# Patient Record
Sex: MALE | Race: WHITE | Employment: OTHER | URBAN - METROPOLITAN AREA
[De-identification: names, ages, dates, MRNs, and addresses within clinical notes are randomized per-mention and may not be internally consistent; named-entity substitution may affect disease eponyms.]

---

## 2017-02-07 ENCOUNTER — TELEPHONE (OUTPATIENT)
Dept: CARDIOLOGY CLINIC | Age: 82
End: 2017-02-07

## 2017-03-07 RX ORDER — DABIGATRAN ETEXILATE 150 MG/1
CAPSULE, COATED PELLETS ORAL
Qty: 180 CAPSULE | Refills: 1 | Status: SHIPPED | OUTPATIENT
Start: 2017-03-07 | End: 2017-03-20 | Stop reason: SDUPTHER

## 2017-03-20 ENCOUNTER — OFFICE VISIT (OUTPATIENT)
Dept: CARDIOLOGY CLINIC | Age: 82
End: 2017-03-20

## 2017-03-20 VITALS
DIASTOLIC BLOOD PRESSURE: 60 MMHG | WEIGHT: 164.75 LBS | SYSTOLIC BLOOD PRESSURE: 128 MMHG | HEART RATE: 57 BPM | BODY MASS INDEX: 23.98 KG/M2

## 2017-03-20 DIAGNOSIS — I10 ESSENTIAL HYPERTENSION: ICD-10-CM

## 2017-03-20 DIAGNOSIS — I48.0 PAROXYSMAL ATRIAL FIBRILLATION (HCC): Primary | ICD-10-CM

## 2017-03-20 PROCEDURE — 93000 ELECTROCARDIOGRAM COMPLETE: CPT | Performed by: NURSE PRACTITIONER

## 2017-03-20 PROCEDURE — 99213 OFFICE O/P EST LOW 20 MIN: CPT | Performed by: NURSE PRACTITIONER

## 2017-03-20 RX ORDER — TAMSULOSIN HYDROCHLORIDE 0.4 MG/1
0.4 CAPSULE ORAL 2 TIMES DAILY
COMMUNITY
End: 2018-09-11

## 2017-03-20 RX ORDER — SOTALOL HYDROCHLORIDE 80 MG/1
TABLET ORAL
Qty: 180 TABLET | Refills: 3 | Status: SHIPPED | OUTPATIENT
Start: 2017-03-20 | End: 2017-11-10 | Stop reason: SDUPTHER

## 2017-03-20 RX ORDER — DABIGATRAN ETEXILATE 150 MG/1
CAPSULE, COATED PELLETS ORAL
Qty: 180 CAPSULE | Refills: 3 | Status: SHIPPED | OUTPATIENT
Start: 2017-03-20 | End: 2017-10-23 | Stop reason: ALTCHOICE

## 2017-10-23 ENCOUNTER — OFFICE VISIT (OUTPATIENT)
Dept: CARDIOLOGY CLINIC | Age: 82
End: 2017-10-23

## 2017-10-23 VITALS
WEIGHT: 162 LBS | DIASTOLIC BLOOD PRESSURE: 78 MMHG | SYSTOLIC BLOOD PRESSURE: 134 MMHG | HEART RATE: 54 BPM | BODY MASS INDEX: 23.58 KG/M2

## 2017-10-23 DIAGNOSIS — I10 ESSENTIAL HYPERTENSION: ICD-10-CM

## 2017-10-23 DIAGNOSIS — I48.0 PAROXYSMAL ATRIAL FIBRILLATION (HCC): Primary | ICD-10-CM

## 2017-10-23 PROCEDURE — 93000 ELECTROCARDIOGRAM COMPLETE: CPT | Performed by: NURSE PRACTITIONER

## 2017-10-23 PROCEDURE — 99213 OFFICE O/P EST LOW 20 MIN: CPT | Performed by: NURSE PRACTITIONER

## 2017-10-23 NOTE — PROGRESS NOTES
[facility]   Electrophysiology   Date: 10/23/2017     CC: atrial fibrillation   HPI: I had the privilege of seeing Stephenie  in follow up for paroxysmal atrial fibrillation, Hypertension, former smoker and right CEA on 4/5/12 after he was found to have cholesterol emboli on ophthalmology evaluation. During the post-op period S/P CEA 2012, he developed some atrial fibrillation, that was asymptomatic. He is controlled on Sotalol. Anticoagulated with Pradaxa      Interval History:   He has been well since last visit. 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 Denies palpitations, chest pain, shortness of breath or fatigue. He checks his BP at home, but forgot log today. Inquiring about changing from Pradaxa to Eliquis. His sister is on Eliquis. No bleeding or excessive bruising. Recent testing and relevant Cardiac history:  ECG: sinus rhythm, BJm=920 msec   Last Echo: 4/8/12: Left ventricular size is normal. Overall left ventricular function is probably normal. I suspect ejection fraction is in the 55-60% range. Regional abnormalities cannot be excluded. The mitral valve appears grossly normal. The left atrial size appears normal. The aortic valve is difficult to visualize but appears grossly normal. The right heart chambers are poorly seen but appear grossly normal in size. There is no obvious pericardial effusion. No significant valvular insufficiency was seen.    Anticoagulation: Pradaxa       Past Medical History:   Diagnosis Date    Arthritis     Atrial fibrillation ([address]     Cancer (HCC)     SKIN    Carotid artery stenosis     Collar bone fracture 1940'S    RIGHT AND LEFT - NO SURGICAL REPAIR    Diabetes mellitus ([address]     DIET CONTROLLED    Gout     Hyperlipidemia     Hypertension         Past Surgical History:   Procedure Laterality Date    CAROTID ENDARTERECTOMY  4/5/12    R side    CATARACT REMOVAL WITH IMPLANT      RIGHT AND LEFT    COLONOSCOPY      EYE SURGERY laser    OTHER SURGICAL HISTORY      SUTURES LT CHIN AS CHILD     SKIN SPLIT GRAFT  2002    2011    X 3    TONSILLECTOMY         Allergies:  No Known Allergies    Social History:  Reviewed. reports that he quit smoking about 20 years ago. He has a 48.00 pack-year smoking history. He has never used smokeless tobacco. He reports that he does not drink alcohol or use drugs. Family History:  Reviewed. family history includes Diabetes in his brother; Heart Disease in his brother; Stroke in his father. Review of System:    · General ROS: negative for - chills, fever   · Psychological ROS: negative for - anxiety or depression  · Ophthalmic ROS: negative for - eye pain or loss of vision  · ENT ROS: negative for - headaches, sore throat   · Allergy and Immunology ROS: negative for - hives  · Hematological and Lymphatic ROS: negative for - bleeding problems, blood clots, bruising or jaundice  · Endocrine ROS: negative for - skin changes, temperature intolerance or unexpected weight changes  · Respiratory ROS: negative for - cough, sputum, wheezing  · Cardiovascular ROS: Per HPI.    · Gastrointestinal ROS: negative for - abdominal pain, diarrhea, nausea/vomiting, bleeding   · Genito-Urinary ROS: negative for - dysuria or incontinence  · Musculoskeletal ROS: negative for - joint swelling   · Neurological ROS: negative for - confusion, numbness/tingling, seizures, weakness  · Dermatological ROS: negative for - rash    Physical Examination:  Vitals:    10/23/17 1450   BP: 134/78   Pulse: 54       Constitutional and General Appearance: Warm and dry, no apparent distress, normal coloration  HEENT:  Normocephalic, atraumatic  Respiratory:  · Normal excursion and expansion without use of accessory muscles  · Resp Auscultation: Normal breath sounds without dullness  Cardiovascular:  · The apical impulses not displaced  · Heart tones are crisp and normal  · JVP less than 8 cm H2O  · Regular rate and rhythm, S1, S2  · Peripheral pulses are symmetrical and full  · There is no clubbing, cyanosis of the extremities. · No edema  · Pedal Pulses: 2+ and equal   Abdomen:  · No masses or tenderness  · Liver/Spleen: No Abnormalities Noted  Neurological/Psychiatric:  · Alert and oriented in all spheres  · Moves all extremities well  · Exhibits normal gait balance and coordination  · No abnormalities of mood, affect, memory, mentation, or behavior are noted    Labs:  Reviewed. Medication:  Current Outpatient Prescriptions   Medication Sig Dispense Refill    tamsulosin (FLOMAX) 0.4 MG capsule Take 0.4 mg by mouth 2 times daily      dabigatran (PRADAXA) 150 MG capsule TAKE ONE CAPSULE BY MOUTH TWICE A  capsule 3    sotalol (BETAPACE) 80 MG tablet TAKE ONE TABLET BY MOUTH EVERY 12 HOURS 180 tablet 3    lisinopril (PRINIVIL;ZESTRIL) 20 MG tablet Take 1 tablet by mouth daily. (Patient taking differently: Take 5 mg by mouth daily ) 90 tablet 1     No current facility-administered medications for this visit. 1. Paroxysmal atrial fibrillation ([address]    2. Essential hypertension         Assessment and plan:   -Paroxysmal atrial fibrillation   -remains in sinus   -continue Sotalol   -discussed changing from Pradaxa to Eliquis. Last Creatinine 0.82, CrCl=71 ml/min. No issues with changing to Eliquis 5 mg BID. -HTN   -controlled       Thank you for allowing me to participate in the care of Justen Marinelli. Further evaluation will be based upon the patient's clinical course and testing results. All questions and concerns were addressed to the patient/family. Alternatives to my treatment were discussed. I have discussed the above stated plan and the patient verbalized understanding and agreed with the plan.     Mayito Alegria, [address]

## 2017-10-23 NOTE — PATIENT INSTRUCTIONS
No change in medications  Okay to stop Pradaxa and start Eliquis 5 mg twice a day  Follow up in 6 months

## 2017-10-26 ENCOUNTER — TELEPHONE (OUTPATIENT)
Dept: CARDIOLOGY CLINIC | Age: 82
End: 2017-10-26

## 2017-11-10 RX ORDER — SOTALOL HYDROCHLORIDE 80 MG/1
TABLET ORAL
Qty: 180 TABLET | Refills: 3 | Status: SHIPPED | OUTPATIENT
Start: 2017-11-10 | End: 2018-11-19 | Stop reason: SDUPTHER

## 2017-11-10 NOTE — TELEPHONE ENCOUNTER
Sample requested: Eliquis     Strength: 5 mg    Dosage: 1 tab BID    Patient's call back number: [phone]

## 2017-12-14 ENCOUNTER — TELEPHONE (OUTPATIENT)
Dept: CARDIOLOGY CLINIC | Age: 82
End: 2017-12-14

## 2018-01-11 ENCOUNTER — HOSPITAL ENCOUNTER (OUTPATIENT)
Dept: OTHER | Age: 83
Discharge: HOME | End: 2018-01-11
Attending: FAMILY MEDICINE | Admitting: FAMILY MEDICINE

## 2018-01-11 DIAGNOSIS — R52 PAIN: ICD-10-CM

## 2018-03-07 LAB
CHOLESTEROL, TOTAL: 140 MG/DL
CHOLESTEROL/HDL RATIO: NORMAL
GLUCOSE FASTING: 106 MG/DL
HDLC SERPL-MCNC: 44 MG/DL (ref 35–70)
LDL CHOLESTEROL CALCULATED: 85 MG/DL (ref 0–160)
TRIGL SERPL-MCNC: 55 MG/DL
VLDLC SERPL CALC-MCNC: NORMAL MG/DL

## 2018-04-23 ENCOUNTER — OFFICE VISIT (OUTPATIENT)
Dept: CARDIOLOGY CLINIC | Age: 83
End: 2018-04-23

## 2018-04-23 VITALS
DIASTOLIC BLOOD PRESSURE: 52 MMHG | HEART RATE: 64 BPM | RESPIRATION RATE: 16 BRPM | BODY MASS INDEX: 23.4 KG/M2 | HEIGHT: 69 IN | WEIGHT: 158 LBS | SYSTOLIC BLOOD PRESSURE: 128 MMHG

## 2018-04-23 DIAGNOSIS — I48.0 PAROXYSMAL ATRIAL FIBRILLATION (HCC): ICD-10-CM

## 2018-04-23 DIAGNOSIS — I10 ESSENTIAL HYPERTENSION: Primary | ICD-10-CM

## 2018-04-23 PROCEDURE — 99214 OFFICE O/P EST MOD 30 MIN: CPT | Performed by: NURSE PRACTITIONER

## 2018-04-23 PROCEDURE — 93000 ELECTROCARDIOGRAM COMPLETE: CPT | Performed by: NURSE PRACTITIONER

## 2018-06-04 VITALS
HEIGHT: 70 IN | RESPIRATION RATE: 15 BRPM | DIASTOLIC BLOOD PRESSURE: 62 MMHG | HEART RATE: 70 BPM | BODY MASS INDEX: 23.77 KG/M2 | TEMPERATURE: 97.6 F | WEIGHT: 166 LBS | SYSTOLIC BLOOD PRESSURE: 124 MMHG

## 2018-06-18 ENCOUNTER — TELEPHONE (OUTPATIENT)
Dept: CARDIOLOGY CLINIC | Age: 83
End: 2018-06-18

## 2018-07-11 ENCOUNTER — TELEPHONE (OUTPATIENT)
Dept: CARDIOLOGY CLINIC | Age: 83
End: 2018-07-11

## 2018-09-04 ENCOUNTER — TELEPHONE (OUTPATIENT)
Dept: CARDIOLOGY CLINIC | Age: 83
End: 2018-09-04

## 2018-09-04 LAB
A/G RATIO: 1.6 (ref 1.1–2.2)
ALBUMIN SERPL-MCNC: 4.2 G/DL (ref 3.4–5)
ALP BLD-CCNC: 68 U/L (ref 40–129)
ALT SERPL-CCNC: 13 U/L (ref 10–40)
ANION GAP SERPL CALCULATED.3IONS-SCNC: 13 MMOL/L (ref 3–16)
AST SERPL-CCNC: 18 U/L (ref 15–37)
BILIRUB SERPL-MCNC: 0.7 MG/DL (ref 0–1)
BUN BLDV-MCNC: 11 MG/DL (ref 7–20)
CALCIUM SERPL-MCNC: 9.1 MG/DL (ref 8.3–10.6)
CHLORIDE BLD-SCNC: 104 MMOL/L (ref 99–110)
CO2: 25 MMOL/L (ref 21–32)
CREAT SERPL-MCNC: 0.8 MG/DL (ref 0.8–1.3)
GFR AFRICAN AMERICAN: >60
GFR NON-AFRICAN AMERICAN: >60
GLOBULIN: 2.6 G/DL
GLUCOSE BLD-MCNC: 110 MG/DL (ref 70–99)
POTASSIUM SERPL-SCNC: 4.2 MMOL/L (ref 3.5–5.1)
SODIUM BLD-SCNC: 142 MMOL/L (ref 136–145)
TOTAL PROTEIN: 6.8 G/DL (ref 6.4–8.2)

## 2018-09-04 NOTE — TELEPHONE ENCOUNTER
Sample requested: eliquis     Strength:     Dosage:     Patient's call back number: LM he doesn't answer numbers he doesn't know

## 2018-09-04 NOTE — TELEPHONE ENCOUNTER
lmor- placed Eliquis 5 mg x 3 lot # [id number], exp date 12/2020 at [facility] cardiac office .

## 2018-09-05 LAB
ESTIMATED AVERAGE GLUCOSE: 111.2 MG/DL
HBA1C MFR BLD: 5.5 %

## 2018-09-11 ENCOUNTER — OFFICE VISIT (OUTPATIENT)
Dept: PRIMARY CARE CLINIC | Age: 83
End: 2018-09-11

## 2018-09-11 VITALS
BODY MASS INDEX: 23.62 KG/M2 | HEART RATE: 64 BPM | DIASTOLIC BLOOD PRESSURE: 62 MMHG | HEIGHT: 70 IN | WEIGHT: 165 LBS | SYSTOLIC BLOOD PRESSURE: 136 MMHG | RESPIRATION RATE: 15 BRPM

## 2018-09-11 DIAGNOSIS — N40.1 BENIGN PROSTATIC HYPERPLASIA WITH LOWER URINARY TRACT SYMPTOMS, SYMPTOM DETAILS UNSPECIFIED: ICD-10-CM

## 2018-09-11 DIAGNOSIS — Z23 INFLUENZA VACCINE NEEDED: ICD-10-CM

## 2018-09-11 DIAGNOSIS — I48.91 ATRIAL FIBRILLATION, UNSPECIFIED TYPE (HCC): Primary | ICD-10-CM

## 2018-09-11 DIAGNOSIS — R73.03 PREDIABETES: ICD-10-CM

## 2018-09-11 PROCEDURE — 90662 IIV NO PRSV INCREASED AG IM: CPT | Performed by: FAMILY MEDICINE

## 2018-09-11 PROCEDURE — 99214 OFFICE O/P EST MOD 30 MIN: CPT | Performed by: FAMILY MEDICINE

## 2018-09-11 PROCEDURE — G0008 ADMIN INFLUENZA VIRUS VAC: HCPCS | Performed by: FAMILY MEDICINE

## 2018-09-11 RX ORDER — TAMSULOSIN HYDROCHLORIDE 0.4 MG/1
0.4 CAPSULE ORAL 2 TIMES DAILY
Qty: 30 CAPSULE | Refills: 1 | Status: SHIPPED | OUTPATIENT
Start: 2018-09-11 | End: 2019-08-15

## 2018-09-11 ASSESSMENT — ENCOUNTER SYMPTOMS
SINUS PRESSURE: 0
GASTROINTESTINAL NEGATIVE: 1
NAUSEA: 0
VOMITING: 0
ABDOMINAL DISTENTION: 0
SHORTNESS OF BREATH: 0
COLOR CHANGE: 0
WHEEZING: 0
CHEST TIGHTNESS: 0
SORE THROAT: 0
ABDOMINAL PAIN: 0
BACK PAIN: 0
COUGH: 0
EYE PAIN: 0
RESPIRATORY NEGATIVE: 1
EYE DISCHARGE: 0
TROUBLE SWALLOWING: 0
EYE ITCHING: 0
APNEA: 0
EYES NEGATIVE: 1
DIARRHEA: 0
CONSTIPATION: 0
PHOTOPHOBIA: 0

## 2018-09-11 ASSESSMENT — PATIENT HEALTH QUESTIONNAIRE - PHQ9
SUM OF ALL RESPONSES TO PHQ QUESTIONS 1-9: 0
SUM OF ALL RESPONSES TO PHQ QUESTIONS 1-9: 0
2. FEELING DOWN, DEPRESSED OR HOPELESS: 0
1. LITTLE INTEREST OR PLEASURE IN DOING THINGS: 0
SUM OF ALL RESPONSES TO PHQ9 QUESTIONS 1 & 2: 0

## 2018-09-11 NOTE — PROGRESS NOTES
9/11/2018    Chief Complaint   Patient presents with    Hyperlipidemia     labs done 9-4-18, pt not on any meds for BP or chol, states feeling well    Hypertension     not checking BP very often, pt wants flu shot       HPI:  Patient is here for follow-up of his hypertension and hyperlipidemia but is not on any medication. He wants to get a flu shot today. He states he saw Dr. Jenette Libman of the urology group back in April for his BPH. Patient states she's currently urinating about every 2-3 hours at night and he would like to go back on Flomax. He denies chest pain shortness of breath nausea vomiting diarrhea fever chills polyuria polydipsia. Review of Systems   Constitutional: Negative for activity change, appetite change, chills and unexpected weight change. HENT: Negative for congestion, ear pain, hearing loss, sinus pressure, sore throat and trouble swallowing. Eyes: Negative. Negative for photophobia, pain, discharge, itching and visual disturbance. Respiratory: Negative. Negative for apnea, cough, chest tightness, shortness of breath and wheezing. Cardiovascular: Negative. Negative for chest pain, palpitations and leg swelling. Gastrointestinal: Negative. Negative for abdominal distention, abdominal pain, constipation, diarrhea, nausea and vomiting. Endocrine: Negative. Negative for cold intolerance, heat intolerance, polydipsia, polyphagia and polyuria. Genitourinary: Negative for difficulty urinating, dysuria, frequency and urgency. Musculoskeletal: Negative. Negative for arthralgias, back pain, gait problem and joint swelling. Skin: Negative. Negative for color change, pallor, rash and wound. Allergic/Immunologic: Negative for food allergies. Neurological: Negative. Negative for dizziness, weakness, light-headedness, numbness and headaches. Hematological: Negative. Negative for adenopathy. Psychiatric/Behavioral: Negative.   Negative for agitation, behavioral normal.   Skin: Skin is warm and dry. No rash noted. He is not diaphoretic. No erythema. No pallor. Psychiatric: He has a normal mood and affect. His behavior is normal. Judgment and thought content normal.   Vitals reviewed. ASSESSMENT/PLAN:  Sommer Ross was seen today for hyperlipidemia and hypertension. Diagnoses and all orders for this visit:    Atrial fibrillation, unspecified type ([address]  He'll continue Eliquis 5 mg b.i.d. and Betapace 80 mg a day he gets up from his cardiologist.  Influenza vaccine needed  -     INFLUENZA, HIGH DOSE, 65 YRS +, IM, PF, PREFILL SYR, 0.5ML (FLUZONE HD)  Him a flu shot today. Prediabetes  -     Hemoglobin A1C; Future  Will an A1c is 5.5. His fasting blood sugar was a little elevated at 110 fasting. Adding any symptoms of diabetes. Will continue diet control. Recheck his A1c in 6 months. Benign prostatic hyperplasia with lower urinary tract symptoms, symptom details unspecified  -     tamsulosin (FLOMAX) 0.4 MG capsule; Take 1 capsule by mouth 2 times daily    I restarted him on his Flomax he can start at 1 tablet a day for the next couple of weeks if his symptoms don't improve he can go to 2 pills a day. I did caution him about hypotension. .  And I'll check him again in one month. No Follow-up on file.   Reviewed and/or ordered clinical lab results Yes  Reviewed and/or ordered radiology tests No  Reviewed and/or ordered other diagnostic tests No  Discussed test results with performing physician No  Independently reviewed image, tracing, or specimen No  Made a decision to obtain old records No  Reviewed and summarized old records No      Pam Hodge was counseled regarding symptoms of current diagnosis, course and complications of disease if inadequately treated, side effects of medications, diagnosis, treatment options, and prognosis, risks, benefits, complications, and alternatives of treatment, labs, imaging and other studies and treatment targets and

## 2018-09-24 ENCOUNTER — TELEPHONE (OUTPATIENT)
Dept: CARDIOLOGY CLINIC | Age: 83
End: 2018-09-24

## 2018-09-24 NOTE — TELEPHONE ENCOUNTER
Sample requested: Eliquis    Strength:  5 mg    Dosage: 1 tab BID    Patient's call back number: [phone]

## 2018-09-25 ENCOUNTER — TELEPHONE (OUTPATIENT)
Dept: CARDIOLOGY CLINIC | Age: 83
End: 2018-09-25

## 2018-09-25 NOTE — TELEPHONE ENCOUNTER
Sample requested: Eliquis      Strength: 5mg    Dosage: 2 times daily    Patient's call back number: [phone]

## 2018-10-10 ENCOUNTER — OFFICE VISIT (OUTPATIENT)
Dept: PRIMARY CARE CLINIC | Age: 83
End: 2018-10-10
Payer: COMMERCIAL

## 2018-10-10 VITALS
DIASTOLIC BLOOD PRESSURE: 72 MMHG | SYSTOLIC BLOOD PRESSURE: 142 MMHG | BODY MASS INDEX: 23.62 KG/M2 | RESPIRATION RATE: 15 BRPM | WEIGHT: 165 LBS | HEIGHT: 70 IN | HEART RATE: 70 BPM

## 2018-10-10 DIAGNOSIS — N40.0 BENIGN PROSTATIC HYPERPLASIA WITHOUT LOWER URINARY TRACT SYMPTOMS: Primary | ICD-10-CM

## 2018-10-10 PROCEDURE — 99213 OFFICE O/P EST LOW 20 MIN: CPT | Performed by: FAMILY MEDICINE

## 2018-10-10 ASSESSMENT — ENCOUNTER SYMPTOMS
RESPIRATORY NEGATIVE: 1
SHORTNESS OF BREATH: 0
SINUS PRESSURE: 0
VOMITING: 0
CHEST TIGHTNESS: 0
EYE PAIN: 0
COLOR CHANGE: 0
EYES NEGATIVE: 1
GASTROINTESTINAL NEGATIVE: 1
ABDOMINAL PAIN: 0
PHOTOPHOBIA: 0
SORE THROAT: 0
NAUSEA: 0
EYE DISCHARGE: 0
EYE ITCHING: 0
WHEEZING: 0
DIARRHEA: 0
COUGH: 0
APNEA: 0
TROUBLE SWALLOWING: 0
CONSTIPATION: 0
ABDOMINAL DISTENTION: 0
BACK PAIN: 0

## 2018-10-10 NOTE — PROGRESS NOTES
BROOKE Hernandez CNP   sotalol (BETAPACE) 80 MG tablet TAKE ONE TABLET BY MOUTH EVERY 12 HOURS Yes BROOKE Pendleton CNP     Current Outpatient Prescriptions   Medication Sig Dispense Refill    tamsulosin (FLOMAX) 0.4 MG capsule Take 1 capsule by mouth 2 times daily 30 capsule 1    apixaban (ELIQUIS) 5 MG TABS tablet Take 1 tablet by mouth 2 times daily 60 tablet 3    sotalol (BETAPACE) 80 MG tablet TAKE ONE TABLET BY MOUTH EVERY 12 HOURS 180 tablet 3     No current facility-administered medications for this visit. Health Maintenance   Topic Date Due    DTaP/Tdap/Td vaccine (1 - Tdap) 12/31/1952    Shingles Vaccine (2 of 2 - 2 Dose Series) 08/23/2016    Flu vaccine  Completed    Pneumococcal low/med risk  Completed      Social History     Social History    Marital status:       Spouse name: N/A    Number of children: N/A    Years of education: N/A     Social History Main Topics    Smoking status: Former Smoker     Packs/day: 1.00     Years: 48.00     Quit date: 8/2/1997    Smokeless tobacco: Never Used    Alcohol use No    Drug use: No    Sexual activity: Not Asked     Other Topics Concern    None     Social History Narrative    None     Family History   Problem Relation Age of Onset    Stroke Father     Heart Disease Brother     Diabetes Brother      Patient Active Problem List   Diagnosis    Carotid stenosis    DM (diabetes mellitus) ([address]    Hypertension    Atrial fibrillation ([address]        LABS:  Orders Only on 09/04/2018   Component Date Value Ref Range Status    Hemoglobin A1C 09/04/2018 5.5  See comment % Final    Comment: Comment:  Diagnosis of Diabetes: > or = 6.5%  Increased risk of diabetes (Prediabetes): 5.7-6.4%  Glycemic Control: Nonpregnant Adults: <7.0%                    Pregnant: <6.0%        eAG 09/04/2018 111.2  mg/dL Final          PHYSICAL EXAM  BP (!) 142/72 (Site: Right Upper Arm, Position: Sitting)   Pulse 70   Resp 15   Ht 5' 9.5\" (1.765 m)   Wt 165 lb (74.8 kg)   BMI 24.02 kg/m²     BP Readings from Last 3 Encounters:   10/10/18 (!) 142/72   09/11/18 136/62   03/13/18 124/62       Wt Readings from Last 3 Encounters:   10/10/18 165 lb (74.8 kg)   09/11/18 165 lb (74.8 kg)   03/13/18 166 lb (75.3 kg)        Physical Exam   Constitutional: He is oriented to person, place, and time. He appears well-developed and well-nourished. No distress. HENT:   Head: Normocephalic and atraumatic. Right Ear: External ear normal.   Left Ear: External ear normal.   Nose: Nose normal.   Mouth/Throat: Oropharynx is clear and moist. No oropharyngeal exudate. Eyes: Pupils are equal, round, and reactive to light. Conjunctivae and EOM are normal. Right eye exhibits no discharge. Left eye exhibits no discharge. No scleral icterus. Neck: Normal range of motion. Neck supple. No JVD present. No tracheal deviation present. No thyromegaly present. Cardiovascular: Normal rate and regular rhythm. Exam reveals no gallop and no friction rub. No murmur heard. Pulmonary/Chest: Effort normal and breath sounds normal. No stridor. No respiratory distress. He has no wheezes. He has no rales. He exhibits no tenderness. Abdominal: Soft. Bowel sounds are normal. He exhibits no distension and no mass. There is no tenderness. There is no rebound and no guarding. Musculoskeletal: Normal range of motion. He exhibits no edema, tenderness or deformity. Lymphadenopathy:     He has no cervical adenopathy. Neurological: He is alert and oriented to person, place, and time. He has normal reflexes. No cranial nerve deficit. He exhibits normal muscle tone. Coordination normal.   Skin: Skin is warm and dry. No rash noted. He is not diaphoretic. No erythema. No pallor. Psychiatric: He has a normal mood and affect. His behavior is normal. Judgment and thought content normal.   Vitals reviewed.       ASSESSMENT/PLAN:  Sharona Mendenhall was seen today for benign prostatic hypertrophy. Diagnoses and all orders for this visit:    Benign prostatic hyperplasia without lower urinary tract symptoms    For now I can continue Flomax 0.4 mg 2 q.d. Made a referral to Dr. Joyce Valentino at the urology group. Surgery got high-dose flu shot this year. No Follow-up on file. Reviewed and/or ordered clinical lab results Yes  Reviewed and/or ordered radiology tests No  Reviewed and/or ordered other diagnostic tests No  Discussed test results with performing physician No  Independently reviewed image, tracing, or specimen No  Made a decision to obtain old records No  Reviewed and summarized old records No      Chandrika Alaniz was counseled regarding symptoms of current diagnosis, course and complications of disease if inadequately treated, side effects of medications, diagnosis, treatment options, and prognosis, risks, benefits, complications, and alternatives of treatment, labs, imaging and other studies and treatment targets and goals. He understands instructions and counseling. This dictation was performed with a verbal recognition program (DRAGON) and it was checked for errors. It is possible that there are still dictated errors within this office note. If so, please bring any errors to my attention for an addendum. All efforts were made to ensure that this office note is accurate.

## 2018-10-22 ENCOUNTER — OFFICE VISIT (OUTPATIENT)
Dept: CARDIOLOGY CLINIC | Age: 83
End: 2018-10-22
Payer: COMMERCIAL

## 2018-10-22 VITALS
HEART RATE: 55 BPM | HEIGHT: 70 IN | OXYGEN SATURATION: 98 % | WEIGHT: 164.5 LBS | DIASTOLIC BLOOD PRESSURE: 58 MMHG | SYSTOLIC BLOOD PRESSURE: 136 MMHG | BODY MASS INDEX: 23.55 KG/M2

## 2018-10-22 DIAGNOSIS — I48.91 ATRIAL FIBRILLATION, UNSPECIFIED TYPE (HCC): Primary | ICD-10-CM

## 2018-10-22 DIAGNOSIS — I10 ESSENTIAL HYPERTENSION: ICD-10-CM

## 2018-10-22 PROCEDURE — 93000 ELECTROCARDIOGRAM COMPLETE: CPT | Performed by: NURSE PRACTITIONER

## 2018-10-22 PROCEDURE — 99214 OFFICE O/P EST MOD 30 MIN: CPT | Performed by: NURSE PRACTITIONER

## 2018-11-05 LAB
ORGANISM: ABNORMAL
URINE CULTURE, ROUTINE: ABNORMAL
URINE CULTURE, ROUTINE: ABNORMAL

## 2018-11-19 RX ORDER — SOTALOL HYDROCHLORIDE 80 MG/1
TABLET ORAL
Qty: 180 TABLET | Refills: 3 | Status: SHIPPED | OUTPATIENT
Start: 2018-11-19 | End: 2019-09-18 | Stop reason: SDUPTHER

## 2018-12-10 DIAGNOSIS — I10 ESSENTIAL HYPERTENSION: Primary | ICD-10-CM

## 2018-12-10 RX ORDER — AMLODIPINE BESYLATE 5 MG/1
5 TABLET ORAL DAILY
Qty: 30 TABLET | Refills: 0 | OUTPATIENT
Start: 2018-12-10 | End: 2019-04-09

## 2018-12-21 ENCOUNTER — TELEPHONE (OUTPATIENT)
Dept: CARDIOLOGY CLINIC | Age: 83
End: 2018-12-21

## 2019-01-07 ENCOUNTER — OFFICE VISIT (OUTPATIENT)
Dept: PRIMARY CARE CLINIC | Age: 84
End: 2019-01-07
Payer: COMMERCIAL

## 2019-01-07 VITALS
SYSTOLIC BLOOD PRESSURE: 126 MMHG | WEIGHT: 172 LBS | HEIGHT: 70 IN | HEART RATE: 72 BPM | BODY MASS INDEX: 24.62 KG/M2 | DIASTOLIC BLOOD PRESSURE: 64 MMHG | RESPIRATION RATE: 15 BRPM

## 2019-01-07 DIAGNOSIS — I48.91 ATRIAL FIBRILLATION, UNSPECIFIED TYPE (HCC): ICD-10-CM

## 2019-01-07 DIAGNOSIS — I10 ESSENTIAL HYPERTENSION: ICD-10-CM

## 2019-01-07 DIAGNOSIS — N40.0 BENIGN PROSTATIC HYPERPLASIA WITHOUT LOWER URINARY TRACT SYMPTOMS: Primary | ICD-10-CM

## 2019-01-07 PROCEDURE — 99213 OFFICE O/P EST LOW 20 MIN: CPT | Performed by: FAMILY MEDICINE

## 2019-01-07 RX ORDER — FINASTERIDE 5 MG/1
5 TABLET, FILM COATED ORAL DAILY
COMMUNITY
End: 2019-09-18 | Stop reason: SDUPTHER

## 2019-01-07 ASSESSMENT — ENCOUNTER SYMPTOMS
RESPIRATORY NEGATIVE: 1
CONSTIPATION: 0
ABDOMINAL DISTENTION: 0
EYE ITCHING: 0
CHEST TIGHTNESS: 0
COLOR CHANGE: 0
SINUS PRESSURE: 0
COUGH: 0
ABDOMINAL PAIN: 0
PHOTOPHOBIA: 0
TROUBLE SWALLOWING: 0
VOMITING: 0
SORE THROAT: 0
EYES NEGATIVE: 1
DIARRHEA: 0
EYE PAIN: 0
BACK PAIN: 0
SHORTNESS OF BREATH: 0
GASTROINTESTINAL NEGATIVE: 1
NAUSEA: 0
EYE DISCHARGE: 0
APNEA: 0
WHEEZING: 0

## 2019-04-02 DIAGNOSIS — R73.03 PREDIABETES: ICD-10-CM

## 2019-04-02 LAB
A/G RATIO: 1.5 (ref 1.1–2.2)
ALBUMIN SERPL-MCNC: 4.1 G/DL (ref 3.4–5)
ALP BLD-CCNC: 60 U/L (ref 40–129)
ALT SERPL-CCNC: 12 U/L (ref 10–40)
ANION GAP SERPL CALCULATED.3IONS-SCNC: 11 MMOL/L (ref 3–16)
AST SERPL-CCNC: 17 U/L (ref 15–37)
BASOPHILS ABSOLUTE: 0 K/UL (ref 0–0.2)
BASOPHILS RELATIVE PERCENT: 0.8 %
BILIRUB SERPL-MCNC: 0.5 MG/DL (ref 0–1)
BUN BLDV-MCNC: 13 MG/DL (ref 7–20)
CALCIUM SERPL-MCNC: 8.9 MG/DL (ref 8.3–10.6)
CHLORIDE BLD-SCNC: 109 MMOL/L (ref 99–110)
CHOLESTEROL, FASTING: 197 MG/DL (ref 0–199)
CO2: 24 MMOL/L (ref 21–32)
CREAT SERPL-MCNC: 0.9 MG/DL (ref 0.8–1.3)
EOSINOPHILS ABSOLUTE: 0.2 K/UL (ref 0–0.6)
EOSINOPHILS RELATIVE PERCENT: 3 %
GFR AFRICAN AMERICAN: >60
GFR NON-AFRICAN AMERICAN: >60
GLOBULIN: 2.7 G/DL
GLUCOSE FASTING: 102 MG/DL (ref 70–99)
HCT VFR BLD CALC: 39.2 % (ref 40.5–52.5)
HDLC SERPL-MCNC: 40 MG/DL (ref 40–60)
HEMOGLOBIN: 13 G/DL (ref 13.5–17.5)
LDL CHOLESTEROL CALCULATED: 138 MG/DL
LYMPHOCYTES ABSOLUTE: 1.7 K/UL (ref 1–5.1)
LYMPHOCYTES RELATIVE PERCENT: 33.8 %
MCH RBC QN AUTO: 28.8 PG (ref 26–34)
MCHC RBC AUTO-ENTMCNC: 33 G/DL (ref 31–36)
MCV RBC AUTO: 87.3 FL (ref 80–100)
MONOCYTES ABSOLUTE: 0.4 K/UL (ref 0–1.3)
MONOCYTES RELATIVE PERCENT: 7 %
NEUTROPHILS ABSOLUTE: 2.8 K/UL (ref 1.7–7.7)
NEUTROPHILS RELATIVE PERCENT: 55.4 %
PDW BLD-RTO: 16.9 % (ref 12.4–15.4)
PLATELET # BLD: 150 K/UL (ref 135–450)
PMV BLD AUTO: 9 FL (ref 5–10.5)
POTASSIUM SERPL-SCNC: 4.5 MMOL/L (ref 3.5–5.1)
PROSTATE SPECIFIC ANTIGEN: 0.58 NG/ML (ref 0–4)
RBC # BLD: 4.49 M/UL (ref 4.2–5.9)
SODIUM BLD-SCNC: 144 MMOL/L (ref 136–145)
T4 FREE: 1.2 NG/DL (ref 0.9–1.8)
TOTAL PROTEIN: 6.8 G/DL (ref 6.4–8.2)
TRIGLYCERIDE, FASTING: 95 MG/DL (ref 0–150)
TSH SERPL DL<=0.05 MIU/L-ACNC: 1.67 UIU/ML (ref 0.27–4.2)
VLDLC SERPL CALC-MCNC: 19 MG/DL
WBC # BLD: 5.1 K/UL (ref 4–11)

## 2019-04-03 LAB
ESTIMATED AVERAGE GLUCOSE: 119.8 MG/DL
HBA1C MFR BLD: 5.8 %

## 2019-04-09 ENCOUNTER — OFFICE VISIT (OUTPATIENT)
Dept: PRIMARY CARE CLINIC | Age: 84
End: 2019-04-09
Payer: COMMERCIAL

## 2019-04-09 VITALS
WEIGHT: 173.6 LBS | DIASTOLIC BLOOD PRESSURE: 66 MMHG | HEART RATE: 55 BPM | SYSTOLIC BLOOD PRESSURE: 116 MMHG | BODY MASS INDEX: 25.27 KG/M2 | OXYGEN SATURATION: 98 % | TEMPERATURE: 97.5 F

## 2019-04-09 DIAGNOSIS — E78.49 OTHER HYPERLIPIDEMIA: ICD-10-CM

## 2019-04-09 DIAGNOSIS — E11.9 TYPE 2 DIABETES MELLITUS WITHOUT COMPLICATION, WITHOUT LONG-TERM CURRENT USE OF INSULIN (HCC): ICD-10-CM

## 2019-04-09 DIAGNOSIS — I10 ESSENTIAL HYPERTENSION: Primary | ICD-10-CM

## 2019-04-09 PROCEDURE — 99213 OFFICE O/P EST LOW 20 MIN: CPT | Performed by: NURSE PRACTITIONER

## 2019-04-09 RX ORDER — SULFAMETHOXAZOLE AND TRIMETHOPRIM 800; 160 MG/1; MG/1
1 TABLET ORAL EVERY OTHER DAY
COMMUNITY
End: 2019-08-15

## 2019-04-09 ASSESSMENT — PATIENT HEALTH QUESTIONNAIRE - PHQ9
SUM OF ALL RESPONSES TO PHQ QUESTIONS 1-9: 0
SUM OF ALL RESPONSES TO PHQ9 QUESTIONS 1 & 2: 0
1. LITTLE INTEREST OR PLEASURE IN DOING THINGS: 0
SUM OF ALL RESPONSES TO PHQ QUESTIONS 1-9: 0
2. FEELING DOWN, DEPRESSED OR HOPELESS: 0

## 2019-04-09 ASSESSMENT — ENCOUNTER SYMPTOMS
SHORTNESS OF BREATH: 0
NAUSEA: 0
DIARRHEA: 0
CONSTIPATION: 0
ABDOMINAL DISTENTION: 0
TROUBLE SWALLOWING: 0
COUGH: 0
CHEST TIGHTNESS: 0
ABDOMINAL PAIN: 0

## 2019-04-09 NOTE — PROGRESS NOTES
Dispense Refill    sulfamethoxazole-trimethoprim (BACTRIM DS;SEPTRA DS) 800-160 MG per tablet Take 1 tablet by mouth every other day      finasteride (PROSCAR) 5 MG tablet Take 5 mg by mouth daily      sotalol (BETAPACE) 80 MG tablet TAKE ONE TABLET BY MOUTH EVERY 12 HOURS 180 tablet 3    apixaban (ELIQUIS) 5 MG TABS tablet Take 1 tablet by mouth 2 times daily 60 tablet 5    tamsulosin (FLOMAX) 0.4 MG capsule Take 1 capsule by mouth 2 times daily 30 capsule 1     No current facility-administered medications for this visit. Health Maintenance   Topic Date Due    DTaP/Tdap/Td vaccine (1 - Tdap) 1952    Shingles Vaccine (2 of 2) 2016    Flu vaccine  Completed    Pneumococcal 65+ years Vaccine  Completed      Social History     Socioeconomic History    Marital status:       Spouse name: None    Number of children: None    Years of education: None    Highest education level: None   Occupational History    None   Social Needs    Financial resource strain: None    Food insecurity:     Worry: None     Inability: None    Transportation needs:     Medical: None     Non-medical: None   Tobacco Use    Smoking status: Former Smoker     Packs/day: 1.00     Years: 48.00     Pack years: 48.00     Last attempt to quit: 1997     Years since quittin.6    Smokeless tobacco: Never Used   Substance and Sexual Activity    Alcohol use: No     Alcohol/week: 0.0 oz    Drug use: No    Sexual activity: None   Lifestyle    Physical activity:     Days per week: None     Minutes per session: None    Stress: None   Relationships    Social connections:     Talks on phone: None     Gets together: None     Attends Latter day service: None     Active member of club or organization: None     Attends meetings of clubs or organizations: None     Relationship status: None    Intimate partner violence:     Fear of current or ex partner: None     Emotionally abused: None     Physically abused: None Forced sexual activity: None   Other Topics Concern    None   Social History Narrative    None     Family History   Problem Relation Age of Onset    Stroke Father     Heart Disease Brother     Diabetes Brother      Patient Active Problem List   Diagnosis    Carotid stenosis    DM (diabetes mellitus) ([address]    Hypertension    Atrial fibrillation (HCC)    Other hyperlipidemia        LABS:  Orders Only on 04/02/2019   Component Date Value Ref Range Status    Hemoglobin A1C 04/02/2019 5.8  See comment % Final    Comment: Comment:  Diagnosis of Diabetes: > or = 6.5%  Increased risk of diabetes (Prediabetes): 5.7-6.4%  Glycemic Control: Nonpregnant Adults: <7.0%                    Pregnant: <6.0%        eAG 04/02/2019 119.8  mg/dL Final    WBC 04/02/2019 5.1  4.0 - 11.0 K/uL Final    RBC 04/02/2019 4.49  4.20 - 5.90 M/uL Final    Hemoglobin 04/02/2019 13.0* 13.5 - 17.5 g/dL Final    Hematocrit 04/02/2019 39.2* 40.5 - 52.5 % Final    MCV 04/02/2019 87.3  80.0 - 100.0 fL Final    MCH 04/02/2019 28.8  26.0 - 34.0 pg Final    MCHC 04/02/2019 33.0  31.0 - 36.0 g/dL Final    RDW 04/02/2019 16.9* 12.4 - 15.4 % Final    Platelets 81/25/0915 150  135 - 450 K/uL Final    MPV 04/02/2019 9.0  5.0 - 10.5 fL Final    Neutrophils % 04/02/2019 55.4  % Final    Lymphocytes % 04/02/2019 33.8  % Final    Monocytes % 04/02/2019 7.0  % Final    Eosinophils % 04/02/2019 3.0  % Final    Basophils % 04/02/2019 0.8  % Final    Neutrophils # 04/02/2019 2.8  1.7 - 7.7 K/uL Final    Lymphocytes # 04/02/2019 1.7  1.0 - 5.1 K/uL Final    Monocytes # 04/02/2019 0.4  0.0 - 1.3 K/uL Final    Eosinophils # 04/02/2019 0.2  0.0 - 0.6 K/uL Final    Basophils # 04/02/2019 0.0  0.0 - 0.2 K/uL Final    Sodium 04/02/2019 144  136 - 145 mmol/L Final    Potassium 04/02/2019 4.5  3.5 - 5.1 mmol/L Final    Chloride 04/02/2019 109  99 - 110 mmol/L Final    CO2 04/02/2019 24  21 - 32 mmol/L Final    Anion Gap 04/02/2019 11  3 - 16 Final    Glucose, Fasting 04/02/2019 102* 70 - 99 mg/dL Final    BUN 04/02/2019 13  7 - 20 mg/dL Final    CREATININE 04/02/2019 0.9  0.8 - 1.3 mg/dL Final    GFR Non- 04/02/2019 >60  >60 Final    Comment: >60 mL/min/1.73m2 EGFR, calc. for ages 25 and older using the  MDRD formula (not corrected for weight), is valid for stable  renal function.  GFR  04/02/2019 >60  >60 Final    Comment: Chronic Kidney Disease: less than 60 ml/min/1.73 sq.m. Kidney Failure: less than 15 ml/min/1.73 sq.m. Results valid for patients 18 years and older.       Calcium 04/02/2019 8.9  8.3 - 10.6 mg/dL Final    Total Protein 04/02/2019 6.8  6.4 - 8.2 g/dL Final    Alb 04/02/2019 4.1  3.4 - 5.0 g/dL Final    Albumin/Globulin Ratio 04/02/2019 1.5  1.1 - 2.2 Final    Total Bilirubin 04/02/2019 0.5  0.0 - 1.0 mg/dL Final    Alkaline Phosphatase 04/02/2019 60  40 - 129 U/L Final    ALT 04/02/2019 12  10 - 40 U/L Final    AST 04/02/2019 17  15 - 37 U/L Final    Globulin 04/02/2019 2.7  g/dL Final    Cholesterol, Fasting 04/02/2019 197  0 - 199 mg/dL Final    Triglyceride, Fasting 04/02/2019 95  0 - 150 mg/dL Final    HDL 04/02/2019 40  40 - 60 mg/dL Final    LDL Calculated 04/02/2019 138* <100 mg/dL Final    VLDL Cholesterol Calculated 04/02/2019 19  Not Established mg/dL Final    T4 Free 04/02/2019 1.2  0.9 - 1.8 ng/dL Final    PSA 04/02/2019 0.58  0.00 - 4.00 ng/mL Final    TSH 04/02/2019 1.67  0.27 - 4.20 uIU/mL Final          PHYSICAL EXAM  /66 (Site: Right Upper Arm, Position: Sitting, Cuff Size: Medium Adult)   Pulse 55   Temp 97.5 °F (36.4 °C) (Oral)   Wt 173 lb 9.6 oz (78.7 kg)   SpO2 98%   BMI 25.27 kg/m²     BP Readings from Last 3 Encounters:   04/09/19 116/66   01/07/19 126/64   10/22/18 (!) 136/58       Wt Readings from Last 3 Encounters:   04/09/19 173 lb 9.6 oz (78.7 kg)   01/07/19 172 lb (78 kg)   10/22/18 164 lb 8 oz (74.6 kg)        Physical Exam Constitutional: He is oriented to person, place, and time. He appears well-developed and well-nourished. HENT:   Head: Normocephalic and atraumatic. Eyes: Pupils are equal, round, and reactive to light. Conjunctivae and EOM are normal.   Neck: Normal range of motion. Neck supple. Cardiovascular: Normal rate, regular rhythm, S1 normal, S2 normal and normal heart sounds. Pulmonary/Chest: Effort normal and breath sounds normal. No stridor. No respiratory distress. He has no wheezes. He has no rales. Abdominal: Soft. Bowel sounds are normal.   Musculoskeletal: Normal range of motion. He exhibits edema (trace BLE). Neurological: He is alert and oriented to person, place, and time. He has normal strength. No cranial nerve deficit or sensory deficit. Skin: Skin is warm and dry. Psychiatric: He has a normal mood and affect. His behavior is normal.   Nursing note and vitals reviewed. ASSESSMENT/PLAN:  Dayton Smalls was seen today for hypertension and hyperlipidemia. Diagnoses and all orders for this visit:    Essential hypertension  -     Comprehensive Metabolic Panel, Fasting; Future  Continue with Betapace 80 mg 1 tab daily   Other hyperlipidemia  -     Comprehensive Metabolic Panel, Fasting; Future  -     Lipid, Fasting; Future  Lifestyle modifications discussed; increase protein and exercise with decrease to calorie consumption and fat    Type 2 diabetes mellitus without complication, without long-term current use of insulin (HCC)  -     Comprehensive Metabolic Panel, Fasting; Future  HgAIC is 5.8, reinforced diet modfications      Return in about 6 months (around 10/9/2019) for DM TYPE II, HYPERLIPIDEMIA, HTN.   Reviewed and/or ordered clinicallab results Yes  Reviewed and/or ordered radiology tests No  Reviewed and/or ordered other diagnostic tests No  Discussed test results with performing physicianNo  Independently reviewed image, tracing, or specimen No  Made a decision to obtain old records

## 2019-04-22 ENCOUNTER — OFFICE VISIT (OUTPATIENT)
Dept: CARDIOLOGY CLINIC | Age: 84
End: 2019-04-22
Payer: COMMERCIAL

## 2019-04-22 VITALS
WEIGHT: 170 LBS | HEIGHT: 69 IN | SYSTOLIC BLOOD PRESSURE: 112 MMHG | BODY MASS INDEX: 25.18 KG/M2 | OXYGEN SATURATION: 98 % | DIASTOLIC BLOOD PRESSURE: 54 MMHG | HEART RATE: 62 BPM

## 2019-04-22 DIAGNOSIS — I48.91 ATRIAL FIBRILLATION, UNSPECIFIED TYPE (HCC): ICD-10-CM

## 2019-04-22 DIAGNOSIS — I10 ESSENTIAL HYPERTENSION: Primary | ICD-10-CM

## 2019-04-22 PROCEDURE — 99214 OFFICE O/P EST MOD 30 MIN: CPT | Performed by: NURSE PRACTITIONER

## 2019-04-22 PROCEDURE — 93000 ELECTROCARDIOGRAM COMPLETE: CPT | Performed by: NURSE PRACTITIONER

## 2019-04-22 NOTE — PROGRESS NOTES
[address]     Outpatient Follow Up Note    CHIEF COMPLAINT / HPI: Follow Up secondary to atrial fibrillation/ HTN/ former smoker/ and right CEA on 12      Abena Resendez is 80 y.o. male who presents today for a routine follow up related to the above mentioned issues. Subjective:   Since the time of last office visit, the patient admits their symptoms have not changed. History of paroxysmal atrial fibrillation, Hypertension, former smoker and right CEA on 12 after he was found to have cholesterol emboli on ophthalmology evaluation. During the post-op period S/P CEA , he developed some atrial fibrillation, that was asymptomatic.  Echo 2012, showed LVEF 55-60%.  He has remained on Sotalol 80 mg twice daily and Eliquis 5 mg BID. At today's office visit patient is being seen for a routine office visit. He is currently in SR, and on Eliquis. He denies any chest pain, palpitations, SOB, dizziness, or edema. He is currently working as  for a pharmacy. With regard to medication therapy the patient has been compliant with prescribed regimen. They have tolerated therapy to date.      Past Medical History:   Diagnosis Date    Arthritis     Atrial fibrillation ([address]     Cancer (HCC)     SKIN    Carotid artery stenosis     Collar bone fracture 0'S    RIGHT AND LEFT - NO SURGICAL REPAIR    Diabetes mellitus ([facility]     DIET CONTROLLED    Gout     Hyperlipidemia     Hypertension      Social History:    Social History     Tobacco Use   Smoking Status Former Smoker    Packs/day: 1.00    Years: 48.00    Pack years: 48.00    Last attempt to quit: 1997    Years since quittin.7   Smokeless Tobacco Never Used     Current Medications:  Current Outpatient Medications   Medication Sig Dispense Refill    sulfamethoxazole-trimethoprim (BACTRIM DS;SEPTRA DS) 800-160 MG per tablet Take 1 tablet by mouth every other day      finasteride (PROSCAR) 5 MG tablet Take 5 mg by mouth daily      sotalol (BETAPACE) 80 MG tablet TAKE ONE TABLET BY MOUTH EVERY 12 HOURS 180 tablet 3    apixaban (ELIQUIS) 5 MG TABS tablet Take 1 tablet by mouth 2 times daily 60 tablet 5    tamsulosin (FLOMAX) 0.4 MG capsule Take 1 capsule by mouth 2 times daily 30 capsule 1     No current facility-administered medications for this visit. REVIEW OF SYSTEMS:   CONSTITUTIONAL: No major weight gain or loss, fatigue, weakness, night sweats or fever. There's been no change in energy level, sleep pattern, or activity level. HEENT: No new vision difficulties or ringing in the ears. RESPIRATORY: No new SOB, PND, orthopnea or cough. CARDIOVASCULAR: See HPI  GI: No nausea, vomiting, diarrhea, constipation, abdominal pain or changes in bowel habits. : No urinary frequency, urgency, incontinence hematuria or dysuria. SKIN: No cyanosis or skin lesions. MUSCULOSKELETAL: No new muscle or joint pain. NEUROLOGICAL: No syncope or TIA-like symptoms. PSYCHIATRIC: No anxiety, pain, insomnia or depression    Objective:   PHYSICAL EXAM:        VITALS:    Vitals:    04/22/19 1343   BP: (!) 112/54   Pulse: 62   SpO2: 98%           CONSTITUTIONAL: Cooperative, no apparent distress, and appears well nourished / developed  NEUROLOGIC:  Awake and orientated to person, place and time. PSYCH: Calm affect. SKIN: Warm and dry. HEENT: Sclera non-icteric, normocephalic, neck supple, no elevation of JVP, normal carotid pulses with no bruits and thyroid normal size. LUNGS:  No increased work of breathing and clear to auscultation, no crackles or wheezing. CARDIOVASCULAR:  Regular rate and rhythm with no murmurs, gallops, rubs, or abnormal heart sounds, normal PMI. The apical impulses not displaced.                              Heart tones are crisp and normal                                                              Cervical veins are not engorged                 JVP less than 8 cm H2O The carotid upstroke is normal in amplitude and contour without delay or bruit    ABDOMEN:  Normal bowel sounds, non-distended and non-tender to palpation   EXT: No edema, no calf tenderness. Pulses are present bilaterally. DATA:    Lab Results   Component Value Date    ALT 12 04/02/2019    AST 17 04/02/2019    ALKPHOS 60 04/02/2019    BILITOT 0.5 04/02/2019     Lab Results   Component Value Date    CREATININE 0.9 04/02/2019    BUN 13 04/02/2019     04/02/2019    K 4.5 04/02/2019     04/02/2019    CO2 24 04/02/2019     Lab Results   Component Value Date    TSH 1.67 04/02/2019     Lab Results   Component Value Date    WBC 5.1 04/02/2019    HGB 13.0 (L) 04/02/2019    HCT 39.2 (L) 04/02/2019    MCV 87.3 04/02/2019     04/02/2019     No components found for: CHLPL  Lab Results   Component Value Date    TRIG 55 03/07/2018    TRIG 46 06/21/2010    TRIG 76 01/25/2010     Lab Results   Component Value Date    HDL 40 04/02/2019    HDL 44 03/07/2018    HDL 35 (L) 06/21/2010     Lab Results   Component Value Date    LDLCALC 138 (H) 04/02/2019    LDLCALC 85 03/07/2018    LDLCALC 79 06/21/2010     Lab Results   Component Value Date    LABVLDL 19 04/02/2019    LABVLDL 9 06/21/2010    LABVLDL 15 01/25/2010     Radiology Review:  Pertinent images / reports were reviewed as a part of this visit and reveals the following:    Last Echo: 4/8/12: Left ventricular size is normal. Overall left ventricular function is probably normal. I suspect ejection fraction is in the 55-60% range. Regional abnormalities cannot be excluded. The mitral valve appears grossly normal. The left atrial size appears normal. The aortic valve is difficult to visualize but appears grossly normal. The right heart chambers are poorly seen but appear grossly normal in size. There is no obvious pericardial effusion. No significant valvular insufficiency was seen.    Anticoagulation: Eliquis    Last ECG: 4/22/19: sinus rhythm reviewed by me     Assessment:     Paroxysmal atrial fibrillation              -currently in sinus rhythm              -continue Sotalol               -on Eliquis       -HTN              -Today's B/P- 112/54               Stable, continue current medications       Patient  is stable since last office visit. Plan:   Continue current medications  Follow up in six months     I have addressed the patient's cardiac risk factors and adjusted pharmacologic treatment as needed. In addition, I have reinforced the need for patient directed risk factor modification. Further evaluation will be based upon the patient's clinical course and testing results. All questions and concerns were addressed to the patient/family. Alternatives to  treatment were discussed. The patient  currently  is not smoking. The risks related to smoking were reviewed with the patient. Recommend maintaining a smoke-free lifestyle. Products available for smoking cessation were discussed. Daily weight, low sodium diet were discussed. Patient instructed to call the office with a weight gain: > 3 lbs over night or 5 lbs in one week; swelling, SOB/orthopnea/PND    Anti-coagulation has been prescribed for this patient. Education conducted on adverse reactions including bleeding was discussed. The patient verbalizes understanding. Thank you for allowing to us to participate in the care of Tong Gonzalez CNP

## 2019-08-15 ENCOUNTER — OFFICE VISIT (OUTPATIENT)
Dept: PRIMARY CARE CLINIC | Age: 84
End: 2019-08-15
Payer: COMMERCIAL

## 2019-08-15 VITALS
DIASTOLIC BLOOD PRESSURE: 70 MMHG | SYSTOLIC BLOOD PRESSURE: 140 MMHG | BODY MASS INDEX: 24.57 KG/M2 | OXYGEN SATURATION: 98 % | TEMPERATURE: 98.1 F | HEART RATE: 52 BPM | WEIGHT: 166.4 LBS

## 2019-08-15 DIAGNOSIS — I10 ESSENTIAL HYPERTENSION: Primary | ICD-10-CM

## 2019-08-15 PROCEDURE — 93000 ELECTROCARDIOGRAM COMPLETE: CPT | Performed by: INTERNAL MEDICINE

## 2019-08-15 PROCEDURE — 99214 OFFICE O/P EST MOD 30 MIN: CPT | Performed by: INTERNAL MEDICINE

## 2019-08-15 RX ORDER — HYDROCHLOROTHIAZIDE 25 MG/1
TABLET ORAL
COMMUNITY
Start: 2019-08-14 | End: 2019-09-09 | Stop reason: SDUPTHER

## 2019-08-28 ENCOUNTER — NURSE ONLY (OUTPATIENT)
Dept: PRIMARY CARE CLINIC | Age: 84
End: 2019-08-28

## 2019-08-28 VITALS — SYSTOLIC BLOOD PRESSURE: 138 MMHG | DIASTOLIC BLOOD PRESSURE: 64 MMHG

## 2019-08-28 DIAGNOSIS — Z01.30 BLOOD PRESSURE CHECK: Primary | ICD-10-CM

## 2019-09-11 DIAGNOSIS — I10 ESSENTIAL HYPERTENSION: ICD-10-CM

## 2019-09-11 LAB
A/G RATIO: 1.8 (ref 1.1–2.2)
ALBUMIN SERPL-MCNC: 4.6 G/DL (ref 3.4–5)
ALP BLD-CCNC: 73 U/L (ref 40–129)
ALT SERPL-CCNC: 8 U/L (ref 10–40)
ANION GAP SERPL CALCULATED.3IONS-SCNC: 13 MMOL/L (ref 3–16)
AST SERPL-CCNC: 10 U/L (ref 15–37)
BASOPHILS ABSOLUTE: 0 K/UL (ref 0–0.2)
BASOPHILS RELATIVE PERCENT: 0.4 %
BILIRUB SERPL-MCNC: 0.6 MG/DL (ref 0–1)
BUN BLDV-MCNC: 19 MG/DL (ref 7–20)
CALCIUM SERPL-MCNC: 9.1 MG/DL (ref 8.3–10.6)
CHLORIDE BLD-SCNC: 99 MMOL/L (ref 99–110)
CHOLESTEROL, FASTING: 197 MG/DL (ref 0–199)
CO2: 30 MMOL/L (ref 21–32)
CREAT SERPL-MCNC: 0.8 MG/DL (ref 0.8–1.3)
EOSINOPHILS ABSOLUTE: 0.2 K/UL (ref 0–0.6)
EOSINOPHILS RELATIVE PERCENT: 4.4 %
GFR AFRICAN AMERICAN: >60
GFR NON-AFRICAN AMERICAN: >60
GLOBULIN: 2.6 G/DL
GLUCOSE FASTING: 117 MG/DL (ref 70–99)
HCT VFR BLD CALC: 40.9 % (ref 40.5–52.5)
HDLC SERPL-MCNC: 40 MG/DL (ref 40–60)
HEMOGLOBIN: 13.8 G/DL (ref 13.5–17.5)
LDL CHOLESTEROL CALCULATED: 137 MG/DL
LYMPHOCYTES ABSOLUTE: 1.8 K/UL (ref 1–5.1)
LYMPHOCYTES RELATIVE PERCENT: 31.9 %
MCH RBC QN AUTO: 29.7 PG (ref 26–34)
MCHC RBC AUTO-ENTMCNC: 33.7 G/DL (ref 31–36)
MCV RBC AUTO: 88.2 FL (ref 80–100)
MONOCYTES ABSOLUTE: 0.6 K/UL (ref 0–1.3)
MONOCYTES RELATIVE PERCENT: 10.1 %
NEUTROPHILS ABSOLUTE: 3 K/UL (ref 1.7–7.7)
NEUTROPHILS RELATIVE PERCENT: 53.2 %
PDW BLD-RTO: 15 % (ref 12.4–15.4)
PLATELET # BLD: 183 K/UL (ref 135–450)
PMV BLD AUTO: 8.4 FL (ref 5–10.5)
POTASSIUM SERPL-SCNC: 3.8 MMOL/L (ref 3.5–5.1)
RBC # BLD: 4.64 M/UL (ref 4.2–5.9)
SODIUM BLD-SCNC: 142 MMOL/L (ref 136–145)
TOTAL PROTEIN: 7.2 G/DL (ref 6.4–8.2)
TRIGLYCERIDE, FASTING: 101 MG/DL (ref 0–150)
TSH SERPL DL<=0.05 MIU/L-ACNC: 1.13 UIU/ML (ref 0.27–4.2)
VLDLC SERPL CALC-MCNC: 20 MG/DL
WBC # BLD: 5.6 K/UL (ref 4–11)

## 2019-09-13 LAB — TESTOSTERONE TOTAL: 502 NG/DL (ref 220–1000)

## 2019-09-18 ENCOUNTER — OFFICE VISIT (OUTPATIENT)
Dept: PRIMARY CARE CLINIC | Age: 84
End: 2019-09-18
Payer: COMMERCIAL

## 2019-09-18 VITALS
DIASTOLIC BLOOD PRESSURE: 70 MMHG | SYSTOLIC BLOOD PRESSURE: 128 MMHG | TEMPERATURE: 97.8 F | BODY MASS INDEX: 24.54 KG/M2 | OXYGEN SATURATION: 97 % | WEIGHT: 166.2 LBS | HEART RATE: 56 BPM

## 2019-09-18 DIAGNOSIS — I48.91 ATRIAL FIBRILLATION, UNSPECIFIED TYPE (HCC): ICD-10-CM

## 2019-09-18 DIAGNOSIS — I10 ESSENTIAL HYPERTENSION: ICD-10-CM

## 2019-09-18 DIAGNOSIS — E11.9 TYPE 2 DIABETES MELLITUS WITHOUT COMPLICATION, WITHOUT LONG-TERM CURRENT USE OF INSULIN (HCC): Primary | ICD-10-CM

## 2019-09-18 DIAGNOSIS — N40.0 BENIGN PROSTATIC HYPERPLASIA WITHOUT LOWER URINARY TRACT SYMPTOMS: ICD-10-CM

## 2019-09-18 PROCEDURE — 99213 OFFICE O/P EST LOW 20 MIN: CPT | Performed by: INTERNAL MEDICINE

## 2019-09-18 RX ORDER — FINASTERIDE 5 MG/1
5 TABLET, FILM COATED ORAL DAILY
Qty: 30 TABLET | Refills: 11 | Status: SHIPPED | OUTPATIENT
Start: 2019-09-18 | End: 2020-10-05 | Stop reason: SDUPTHER

## 2019-09-18 RX ORDER — SOTALOL HYDROCHLORIDE 80 MG/1
TABLET ORAL
Qty: 180 TABLET | Refills: 3 | Status: SHIPPED | OUTPATIENT
Start: 2019-09-18 | End: 2019-11-27 | Stop reason: ALTCHOICE

## 2019-09-18 NOTE — PROGRESS NOTES
motion. He exhibits no edema or tenderness. Lymphadenopathy:     He has no cervical adenopathy. Neurological: He is alert and oriented to person, place, and time. He displays normal reflexes. No cranial nerve deficit. Coordination normal.   Skin: Skin is warm and dry. No rash noted. No erythema. Scalp scab R frontal   Psychiatric: He has a normal mood and affect. His behavior is normal. Thought content normal.   Nursing note and vitals reviewed.     Lab Results   Component Value Date    TSH 1.13 09/11/2019     Lab Results   Component Value Date    CHOL 140 03/07/2018    CHOL 123 06/21/2010    CHOL 126 01/25/2010     Lab Results   Component Value Date    TRIG 55 03/07/2018    TRIG 46 06/21/2010    TRIG 76 01/25/2010     Lab Results   Component Value Date    HDL 40 09/11/2019    HDL 40 04/02/2019    HDL 44 03/07/2018     Lab Results   Component Value Date    LDLCALC 137 (H) 09/11/2019    LDLCALC 138 (H) 04/02/2019    LDLCALC 85 03/07/2018     Lab Results   Component Value Date    LABVLDL 20 09/11/2019    LABVLDL 19 04/02/2019    LABVLDL 9 06/21/2010     No results found for: CHOLHDLRATIO  Lab Results   Component Value Date     09/11/2019    K 3.8 09/11/2019    CL 99 09/11/2019    CO2 30 09/11/2019    BUN 19 09/11/2019    CREATININE 0.8 09/11/2019    GLUCOSE 110 (H) 09/04/2018    CALCIUM 9.1 09/11/2019    PROT 7.2 09/11/2019    LABALBU 4.6 09/11/2019    BILITOT 0.6 09/11/2019    ALKPHOS 73 09/11/2019    AST 10 (L) 09/11/2019    ALT 8 (L) 09/11/2019    LABGLOM >60 09/11/2019    GFRAA >60 09/11/2019    AGRATIO 1.8 09/11/2019    GLOB 2.6 09/11/2019       Lab Results   Component Value Date    CHOL 140 03/07/2018    CHOL 123 06/21/2010    CHOL 126 01/25/2010     Lab Results   Component Value Date    TRIG 55 03/07/2018    TRIG 46 06/21/2010    TRIG 76 01/25/2010     Lab Results   Component Value Date    HDL 40 09/11/2019    HDL 40 04/02/2019    HDL 44 03/07/2018     Lab Results   Component Value Date    LDLCALC 137 (H) 09/11/2019    LDLCALC 138 (H) 04/02/2019    LDLCALC 85 03/07/2018     Lab Results   Component Value Date     09/11/2019    K 3.8 09/11/2019    CL 99 09/11/2019    CO2 30 09/11/2019    BUN 19 09/11/2019    CREATININE 0.8 09/11/2019    GLUCOSE 110 (H) 09/04/2018    CALCIUM 9.1 09/11/2019    PROT 7.2 09/11/2019    LABALBU 4.6 09/11/2019    BILITOT 0.6 09/11/2019    ALKPHOS 73 09/11/2019    AST 10 (L) 09/11/2019    ALT 8 (L) 09/11/2019    LABGLOM >60 09/11/2019    GFRAA >60 09/11/2019    AGRATIO 1.8 09/11/2019    GLOB 2.6 09/11/2019           Assessment:      Patient Active Problem List   Diagnosis    Carotid stenosis    DM [facility]di[facility] mellitus) [facility]    Hypertension    Atrial fibrillation ([facility]    Other hyperlipidemia           Plan:      Continue current meds  OK'd refills    F/u in 6 months with labs prior again    Advised annual fluvax at local [address] [place] [address] [address], [place]

## 2019-10-28 ENCOUNTER — OFFICE VISIT (OUTPATIENT)
Dept: CARDIOLOGY CLINIC | Age: 84
End: 2019-10-28
Payer: COMMERCIAL

## 2019-10-28 VITALS
HEART RATE: 57 BPM | BODY MASS INDEX: 23.4 KG/M2 | DIASTOLIC BLOOD PRESSURE: 80 MMHG | SYSTOLIC BLOOD PRESSURE: 138 MMHG | RESPIRATION RATE: 16 BRPM | WEIGHT: 158 LBS | OXYGEN SATURATION: 97 % | HEIGHT: 69 IN

## 2019-10-28 DIAGNOSIS — I10 ESSENTIAL HYPERTENSION: ICD-10-CM

## 2019-10-28 DIAGNOSIS — I48.0 PAROXYSMAL ATRIAL FIBRILLATION (HCC): Primary | ICD-10-CM

## 2019-10-28 PROCEDURE — 99213 OFFICE O/P EST LOW 20 MIN: CPT | Performed by: NURSE PRACTITIONER

## 2019-11-04 ENCOUNTER — TELEPHONE (OUTPATIENT)
Dept: CARDIOLOGY CLINIC | Age: 84
End: 2019-11-04

## 2019-11-04 ENCOUNTER — TELEPHONE (OUTPATIENT)
Dept: PRIMARY CARE CLINIC | Age: 84
End: 2019-11-04

## 2019-11-04 DIAGNOSIS — I48.91 ATRIAL FIBRILLATION, UNSPECIFIED TYPE (HCC): ICD-10-CM

## 2019-11-05 DIAGNOSIS — I48.91 ATRIAL FIBRILLATION, UNSPECIFIED TYPE (HCC): ICD-10-CM

## 2019-11-11 ENCOUNTER — TELEPHONE (OUTPATIENT)
Dept: CARDIOLOGY CLINIC | Age: 84
End: 2019-11-11

## 2019-11-11 ENCOUNTER — ANTI-COAG VISIT (OUTPATIENT)
Dept: CARDIOLOGY CLINIC | Age: 84
End: 2019-11-11

## 2019-11-27 ENCOUNTER — OFFICE VISIT (OUTPATIENT)
Dept: CARDIOLOGY CLINIC | Age: 84
End: 2019-11-27
Payer: COMMERCIAL

## 2019-11-27 ENCOUNTER — NURSE ONLY (OUTPATIENT)
Dept: CARDIOLOGY CLINIC | Age: 84
End: 2019-11-27
Payer: COMMERCIAL

## 2019-11-27 VITALS
WEIGHT: 162.2 LBS | BODY MASS INDEX: 24.02 KG/M2 | DIASTOLIC BLOOD PRESSURE: 80 MMHG | HEART RATE: 52 BPM | SYSTOLIC BLOOD PRESSURE: 168 MMHG | HEIGHT: 69 IN

## 2019-11-27 DIAGNOSIS — I48.0 PAROXYSMAL ATRIAL FIBRILLATION (HCC): Primary | ICD-10-CM

## 2019-11-27 DIAGNOSIS — I10 ESSENTIAL HYPERTENSION: ICD-10-CM

## 2019-11-27 PROCEDURE — 93000 ELECTROCARDIOGRAM COMPLETE: CPT | Performed by: NURSE PRACTITIONER

## 2019-11-27 PROCEDURE — 99214 OFFICE O/P EST MOD 30 MIN: CPT | Performed by: NURSE PRACTITIONER

## 2019-12-02 ENCOUNTER — TELEPHONE (OUTPATIENT)
Dept: CARDIOLOGY CLINIC | Age: 84
End: 2019-12-02

## 2019-12-31 PROCEDURE — 93228 REMOTE 30 DAY ECG REV/REPORT: CPT | Performed by: INTERNAL MEDICINE

## 2020-01-08 ENCOUNTER — TELEPHONE (OUTPATIENT)
Dept: CARDIOLOGY CLINIC | Age: 85
End: 2020-01-08

## 2020-02-13 ENCOUNTER — TELEPHONE (OUTPATIENT)
Dept: CARDIOLOGY CLINIC | Age: 85
End: 2020-02-13

## 2020-02-13 NOTE — TELEPHONE ENCOUNTER
Looking through [facility]MM schedule and saw this patient has follow up appt with [facility] on 2/25/20 but has new patient appt with RMM on 4/143/20. Should the appt in April be cancelled ?

## 2020-02-13 NOTE — TELEPHONE ENCOUNTER
Cancelled patient's appointment with [facility]. Patient has a New Patient Appointment already scheduled with [facility] on 4/14/20. Is this ok or does patient need to be seen sooner? Patient states no new symptoms and that it is ok with him to wait until April to be seen but wants to make sure this is ok due to results of monitor. Please advise. Thanks.

## 2020-03-16 DIAGNOSIS — I10 ESSENTIAL HYPERTENSION: ICD-10-CM

## 2020-03-16 DIAGNOSIS — E11.9 TYPE 2 DIABETES MELLITUS WITHOUT COMPLICATION, WITHOUT LONG-TERM CURRENT USE OF INSULIN (HCC): ICD-10-CM

## 2020-03-16 LAB
A/G RATIO: 1.7 (ref 1.1–2.2)
ALBUMIN SERPL-MCNC: 4.3 G/DL (ref 3.4–5)
ALP BLD-CCNC: 70 U/L (ref 40–129)
ALT SERPL-CCNC: 12 U/L (ref 10–40)
ANION GAP SERPL CALCULATED.3IONS-SCNC: 15 MMOL/L (ref 3–16)
AST SERPL-CCNC: 17 U/L (ref 15–37)
BASOPHILS ABSOLUTE: 0 K/UL (ref 0–0.2)
BASOPHILS RELATIVE PERCENT: 0.4 %
BILIRUB SERPL-MCNC: 0.5 MG/DL (ref 0–1)
BUN BLDV-MCNC: 17 MG/DL (ref 7–20)
CALCIUM SERPL-MCNC: 9.5 MG/DL (ref 8.3–10.6)
CHLORIDE BLD-SCNC: 102 MMOL/L (ref 99–110)
CHOLESTEROL, FASTING: 185 MG/DL (ref 0–199)
CO2: 29 MMOL/L (ref 21–32)
CREAT SERPL-MCNC: 0.9 MG/DL (ref 0.8–1.3)
EOSINOPHILS ABSOLUTE: 0.3 K/UL (ref 0–0.6)
EOSINOPHILS RELATIVE PERCENT: 5.1 %
GFR AFRICAN AMERICAN: >60
GFR NON-AFRICAN AMERICAN: >60
GLOBULIN: 2.6 G/DL
GLUCOSE FASTING: 112 MG/DL (ref 70–99)
HCT VFR BLD CALC: 42.5 % (ref 40.5–52.5)
HDLC SERPL-MCNC: 47 MG/DL (ref 40–60)
HEMOGLOBIN: 14.2 G/DL (ref 13.5–17.5)
LDL CHOLESTEROL CALCULATED: 122 MG/DL
LYMPHOCYTES ABSOLUTE: 1.7 K/UL (ref 1–5.1)
LYMPHOCYTES RELATIVE PERCENT: 32.2 %
MCH RBC QN AUTO: 29.1 PG (ref 26–34)
MCHC RBC AUTO-ENTMCNC: 33.3 G/DL (ref 31–36)
MCV RBC AUTO: 87.2 FL (ref 80–100)
MONOCYTES ABSOLUTE: 0.5 K/UL (ref 0–1.3)
MONOCYTES RELATIVE PERCENT: 8.4 %
NEUTROPHILS ABSOLUTE: 2.9 K/UL (ref 1.7–7.7)
NEUTROPHILS RELATIVE PERCENT: 53.9 %
PDW BLD-RTO: 14.8 % (ref 12.4–15.4)
PLATELET # BLD: 180 K/UL (ref 135–450)
PMV BLD AUTO: 9.3 FL (ref 5–10.5)
POTASSIUM SERPL-SCNC: 4 MMOL/L (ref 3.5–5.1)
RBC # BLD: 4.88 M/UL (ref 4.2–5.9)
SODIUM BLD-SCNC: 146 MMOL/L (ref 136–145)
TOTAL PROTEIN: 6.9 G/DL (ref 6.4–8.2)
TRIGLYCERIDE, FASTING: 82 MG/DL (ref 0–150)
VLDLC SERPL CALC-MCNC: 16 MG/DL
WBC # BLD: 5.4 K/UL (ref 4–11)

## 2020-03-17 LAB
ESTIMATED AVERAGE GLUCOSE: 105.4 MG/DL
HBA1C MFR BLD: 5.3 %

## 2020-03-23 ENCOUNTER — OFFICE VISIT (OUTPATIENT)
Dept: PRIMARY CARE CLINIC | Age: 85
End: 2020-03-23
Payer: COMMERCIAL

## 2020-03-23 VITALS
WEIGHT: 161.6 LBS | TEMPERATURE: 98 F | SYSTOLIC BLOOD PRESSURE: 116 MMHG | HEART RATE: 70 BPM | DIASTOLIC BLOOD PRESSURE: 80 MMHG | BODY MASS INDEX: 23.86 KG/M2 | OXYGEN SATURATION: 98 %

## 2020-03-23 PROCEDURE — 99213 OFFICE O/P EST LOW 20 MIN: CPT | Performed by: INTERNAL MEDICINE

## 2020-03-23 ASSESSMENT — ENCOUNTER SYMPTOMS
DIARRHEA: 0
SHORTNESS OF BREATH: 0
ABDOMINAL PAIN: 0
NAUSEA: 0
COUGH: 0
BACK PAIN: 0
ABDOMINAL DISTENTION: 0
CHEST TIGHTNESS: 0

## 2020-03-23 NOTE — PATIENT INSTRUCTIONS
petting, snuggling, being kissed or licked, and sharing food. If you must care for your pet or be around animals while you are sick, wash your hands before and after you interact with pets and wear a facemask. Call ahead before visiting your doctor  If you have a medical appointment, call the healthcare provider and tell them that you have or may have COVID-19. This will help the healthcare providers office take steps to keep other people from getting infected or exposed. Wear a facemask  You should wear a facemask when you are around other people (e.g., sharing a room or vehicle) or pets and before you enter a healthcare providers office. If you are not able to wear a facemask (for example, because it causes trouble breathing), then people who live with you should not stay in the same room with you, or they should wear a facemask if they enter your room. Cover your coughs and sneezes  Cover your mouth and nose with a tissue when you cough or sneeze. Throw used tissues in a lined trash can. Immediately wash your hands with soap and water for at least 20 seconds or, if soap and water are not available, clean your hands with an alcohol-based hand  that contains at least 60% alcohol. Clean your hands often  Wash your hands often with soap and water for at least 20 seconds, especially after blowing your nose, coughing, or sneezing; going to the bathroom; and before eating or preparing food. If soap and water are not readily available, use an alcohol-based hand  with at least 60% alcohol, covering all surfaces of your hands and rubbing them together until they feel dry. Soap and water are the best option if hands are visibly dirty. Avoid touching your eyes, nose, and mouth with unwashed hands. Avoid sharing personal household items  You should not share dishes, drinking glasses, cups, eating utensils, towels, or bedding with other people or pets in your home.  After using these items, they should be washed thoroughly with soap and water. Clean all high-touch surfaces everyday  High touch surfaces include counters, tabletops, doorknobs, bathroom fixtures, toilets, phones, keyboards, tablets, and bedside tables. Also, clean any surfaces that may have blood, stool, or body fluids on them. Use a household cleaning spray or wipe, according to the label instructions. Labels contain instructions for safe and effective use of the cleaning product including precautions you should take when applying the product, such as wearing gloves and making sure you have good ventilation during use of the product. Monitor your symptoms  Seek prompt medical attention if your illness is worsening (e.g., difficulty breathing). Before seeking care, call your healthcare provider and tell them that you have, or are being evaluated for, COVID-19. Put on a facemask before you enter the facility. These steps will help the healthcare providers office to keep other people in the office or waiting room from getting infected or exposed. Ask your healthcare provider to call the local or [facility] health department. Persons who are placed under active monitoring or facilitated self-monitoring should follow instructions provided by their local health department or occupational health professionals, as appropriate. When working with your local health department check their available hours. If you have a medical emergency and need to call 911, notify the dispatch personnel that you have, or are being evaluated for COVID-19. If possible, put on a facemask before emergency medical services arrive. Discontinuing home isolation  Patients with confirmed COVID-19 should remain under home isolation precautions until the risk of secondary transmission to others is thought to be low.  The decision to discontinue home isolation precautions should be made on a case-by-case basis, in consultation with healthcare providers and state and [facility] health

## 2020-03-23 NOTE — PROGRESS NOTES
CBC:   Lab Results   Component Value Date    WBC 5.4 03/16/2020    WBC 5.6 09/11/2019    WBC 5.1 04/02/2019    HGB 14.2 03/16/2020    HGB 13.8 09/11/2019    HGB 13.0 04/02/2019    HCT 42.5 03/16/2020    HCT 40.9 09/11/2019    HCT 39.2 04/02/2019    MCV 87.2 03/16/2020    MCV 88.2 09/11/2019    MCV 87.3 04/02/2019     03/16/2020     09/11/2019     04/02/2019     Chemistry:   Lab Results   Component Value Date     03/16/2020     09/11/2019     04/02/2019    K 4.0 03/16/2020    K 3.8 09/11/2019    K 4.5 04/02/2019     03/16/2020    CL 99 09/11/2019     04/02/2019    CO2 29 03/16/2020    CO2 30 09/11/2019    CO2 24 04/02/2019    PHOS 3.1 04/09/2012    BUN 17 03/16/2020    BUN 19 09/11/2019    BUN 13 04/02/2019    CREATININE 0.9 03/16/2020    CREATININE 0.8 09/11/2019    CREATININE 0.9 04/02/2019     Hepatic Function:   Lab Results   Component Value Date    AST 17 03/16/2020    AST 10 09/11/2019    AST 17 04/02/2019    ALT 12 03/16/2020    ALT 8 09/11/2019    ALT 12 04/02/2019    BILIDIR 0.20 09/20/2010    BILIDIR 0.22 06/21/2010    BILIDIR 0.21 03/22/2010    BILITOT 0.5 03/16/2020    BILITOT 0.6 09/11/2019    BILITOT 0.5 04/02/2019    ALKPHOS 70 03/16/2020    ALKPHOS 73 09/11/2019    ALKPHOS 60 04/02/2019     No results found for: LIPASE, AMYLASE  Lipids:   Lab Results   Component Value Date    CHOL 140 03/07/2018    HDL 47 03/16/2020    HDL 35 06/21/2010    TRIG 55 03/07/2018       ASSESSMENT/PLAN  1.) Hypertension-BP and labs are well controlled on current regimen,  No refills needed today    2.) Mild metabolic syndrome, M0O 4.2% with     3.) will be getting carotid doppler next week with Dr. Lazaro Blunt  Last CTA 12/2015    4.) sees Cardiology next months for a fib    F/u on 6 months with labs prior again    drives prescription delivery to med to [facility]    Electronically signed by Pilar Magaña MD on 3/23/2020 at 10:01 AM

## 2020-04-14 ENCOUNTER — OFFICE VISIT (OUTPATIENT)
Dept: CARDIOLOGY CLINIC | Age: 85
End: 2020-04-14
Payer: COMMERCIAL

## 2020-04-14 VITALS
DIASTOLIC BLOOD PRESSURE: 65 MMHG | SYSTOLIC BLOOD PRESSURE: 194 MMHG | RESPIRATION RATE: 18 BRPM | HEIGHT: 70 IN | HEART RATE: 72 BPM | BODY MASS INDEX: 23.34 KG/M2 | WEIGHT: 163 LBS

## 2020-04-14 PROCEDURE — 99204 OFFICE O/P NEW MOD 45 MIN: CPT | Performed by: INTERNAL MEDICINE

## 2020-04-14 PROCEDURE — 93000 ELECTROCARDIOGRAM COMPLETE: CPT | Performed by: INTERNAL MEDICINE

## 2020-04-14 RX ORDER — M-VIT,TX,IRON,MINS/CALC/FOLIC 27MG-0.4MG
1 TABLET ORAL DAILY
COMMUNITY

## 2020-04-14 RX ORDER — SOTALOL HYDROCHLORIDE 80 MG/1
80 TABLET ORAL 2 TIMES DAILY
Qty: 180 TABLET | Refills: 3 | Status: SHIPPED | OUTPATIENT
Start: 2020-04-14 | End: 2020-10-30

## 2020-04-14 RX ORDER — AMLODIPINE BESYLATE 10 MG/1
10 TABLET ORAL DAILY
Qty: 90 TABLET | Refills: 3 | Status: SHIPPED | OUTPATIENT
Start: 2020-04-14 | End: 2021-04-05 | Stop reason: SDUPTHER

## 2020-04-14 NOTE — PROGRESS NOTES
includes Diabetes in his brother; Heart Disease in his brother; Stroke in his father. Review of System:  All other systems reviewed except for that noted above. Pertinent negatives and positives are:       General: negative for fever, chills    Ophthalmic ROS: negative for - eye pain or loss of vision   ENT ROS: negative for - headaches, sore throat    Respiratory: negative for - cough, sputum   Cardiovascular: Reviewed in HPI   Gastrointestinal: negative for - abdominal pain, diarrhea, N/V   Hematology: negative for - bleeding, blood clots, bruising or jaundice   Genito-Urinary:  negative for - Dysuria or incontinence   Musculoskeletal: negative for - Joint swelling, muscle pain   Neurological: negative for - confusion, dizziness, headaches    Psychiatric: No anxiety, no depression.  Dermatological: negative for - rash    Physical Examination:  Vitals:    04/14/20 1300   BP: (!) 194/65   Pulse: 72   Resp:       Wt Readings from Last 3 Encounters:   04/14/20 163 lb (73.9 kg)   03/23/20 161 lb 9.6 oz (73.3 kg)   11/27/19 162 lb 3.2 oz (73.6 kg)       · Constitutional: Oriented. No distress. · Head: Normocephalic and atraumatic. · Mouth/Throat: Oropharynx is clear and moist.   · Eyes: Conjunctivae normal. EOM are normal.   · Neck: Neck supple. No JVD present. · Cardiovascular: Normal rate, regular rhythm, S1&S2. · Pulmonary/Chest: Bilateral respiratory sounds. No rhonchi. · Abdominal: Soft. No tenderness. · Musculoskeletal: No tenderness. No edema    · Lymphadenopathy: Has no cervical adenopathy. · Neurological: Alert and oriented. Follows command, No Gross deficit   · Skin: Skin is warm, No rash noted. · Psychiatric: Has a normal behavior       Labs, diagnostic and imaging results reviewed.    Lab Results   Component Value Date    TSH 1.13 09/11/2019    TSH 1.67 04/02/2019    CREATININE 0.9 03/16/2020    CREATININE 0.8 09/11/2019    AST 17 03/16/2020    ALT 12 03/16/2020

## 2020-06-01 ENCOUNTER — OFFICE VISIT (OUTPATIENT)
Dept: CARDIOLOGY CLINIC | Age: 85
End: 2020-06-01
Payer: COMMERCIAL

## 2020-06-01 VITALS
SYSTOLIC BLOOD PRESSURE: 125 MMHG | BODY MASS INDEX: 23.55 KG/M2 | WEIGHT: 159 LBS | DIASTOLIC BLOOD PRESSURE: 57 MMHG | HEIGHT: 69 IN | HEART RATE: 54 BPM

## 2020-06-01 PROCEDURE — 99214 OFFICE O/P EST MOD 30 MIN: CPT | Performed by: NURSE PRACTITIONER

## 2020-06-01 PROCEDURE — 93000 ELECTROCARDIOGRAM COMPLETE: CPT | Performed by: NURSE PRACTITIONER

## 2020-06-01 NOTE — PROGRESS NOTES
[address]   Electrophysiology  BROOKE [facility]  Attending EP: Dr. Jacquie Sanchez   Date: 6/1/2020  I had the privilege of visiting Janelle Sethi in the office. Chief Complaint:   Chief Complaint   Patient presents with    Follow-up     6 week f/u    Atrial Fibrillation     History of Present Illness: History obtained from patient and medical record. Janelle Sethi is 80 y.o. male with a past medical history of HTN, HLD, DM, carotid stenosis s/p R CEA (2012), atrial fibrillation. He developed atrial fibrillation in 2012 post right CEA. He was started on sotalol 80 mg and pradaxa that was later switched to Eliquis. Holter 2012      Interval history: Today, Janelle Sethi is being seen for routine follow up. He states he is feeling well. Denies palpitations and activity level is improved since resuming sotalol. He lowered his dose of Eliquis to 2.5 mg daily due to persistent hematuria after cysto exam that he had done earlier this year. Hematuria recently subsided a few weeks ago. CBC normal 3/2020. Denies palpitations, CP, SOB, dizziness, or peripheral edema. 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 Tolerating amlodipine, he brought a log of BP and HR and average around 272 systolic. Denies having chest pain, palpitations, shortness of breath, cough, or dizziness at the time of this visit. With regard to medication therapy the patient has been compliant with prescribed regimen. He has not tolerated therapy to date. Allergies:  No Known Allergies  Home Medications:  Prior to Visit Medications    Medication Sig Taking?  Authorizing Provider   Multiple Vitamins-Minerals (THERAPEUTIC MULTIVITAMIN-MINERALS) tablet Take 1 tablet by mouth daily  Historical Provider, Lizz Pyle, (CAYENNE PEPPER PO) Take by mouth  Historical Provider, MD   amLODIPine (NORVASC) 10 MG tablet Take 1 tablet by mouth daily  Oceana Kwesi Banks MD   sotalol (BETAPACE) 80 MG tablet Take 1 tablet by mouth 2 times daily  Daniella Haas MD   apixaban (ELIQUIS) 5 MG TABS tablet Take 0.5 tablets by mouth 2 times daily  Robles Alvarez MD   finasteride (PROSCAR) 5 MG tablet Take 1 tablet by mouth daily  Robles Alvarez MD   hydrochlorothiazide (HYDRODIURIL) 25 MG tablet TAKE ONE TABLET BY MOUTH DAILY, 1ST DOSE NOW  Robles Alvarez MD      Past Medical History:  Past Medical History:   Diagnosis Date    Arthritis     [facility]     Cancer ([facility] [address]     SKIN    Carotid artery stenosis     Collar bone fracture 1940'S    RIGHT AND LEFT - NO SURGICAL REPAIR    Diabetes mellitus ([facility] [address]     DIET CONTROLLED    Gout     Hyperlipidemia     Hypertension      Past Surgical History:    has a past surgical history that includes Tonsillectomy; skin split graft (2002 2011); Cataract removal with implant; Colonoscopy; other surgical history; Carotid endarterectomy (4/5/12); and eye surgery. Social History:  Reviewed. reports that he quit smoking about 22 years ago. He has a 48.00 pack-year smoking history. He has never used smokeless tobacco. He reports that he does not drink alcohol or use drugs. Family History:  Reviewed. family history includes Diabetes in his brother; Heart Disease in his brother; Stroke in his father. Denies family history of sudden cardiac death, arrhythmia, premature CAD    Review of System:  · Constitutional: No fevers, chills, weight changes or weakness  · HEENT: No visual changes. No mouth sores or sore throat. · Cardiovascular: denies chest pain, denies dyspnea on exertion, denies palpitations or denies loss of consciousness. No cough, hemoptysis, denies pleuritic pain, or phlebitis. · Respiratory: denies cough or wheezing. No hematemesis. · Gastrointestinal: No abdominal pain, blood in stools.    · Genitourinary: No dysuria, urgency + hematuria  · Musculoskeletal: denies gait disturbance, No muscle weakness. · Integumentary: No rash or pruritis. · Neurological: No headache, change in muscle strength, numbness or tingling. · Psychiatric: No confusion, anxiety, or depression. · Endocrine: No temperature intolerance. No excessive thirst, fluid intake, or urination. · Hem/Lymph: No abnormal bruising or bleeding, blood clots or swollen lymph nodes. Physical Examination:  There were no vitals filed for this visit. Wt Readings from Last 3 Encounters:   04/14/20 163 lb (73.9 kg)   03/23/20 161 lb 9.6 oz (73.3 kg)   11/27/19 162 lb 3.2 oz (73.6 kg)     Constitutional: Cooperative and in no apparent distress, and appears well nourished  Skin: Warm and pink; no pallor, cyanosis or clubbing + scattered bruising  HEENT: Symmetric and normocephalic. PERRL, EOM intact. Conjunctiva pink with clear sclera. Mucus membranes pink and moist.   Respiratory: Respirations symmetric and unlabored. Lungs clear to auscultation bilaterally, no wheezing, rhonchi, or crackles  Cardiovascular:  regular rate and rhythm. S1 & S2 present without murmurs, rubs, or gallops. Peripheral pulses 2+, capillary refill < 3 seconds. negative elevation of JVP. No peripheral edema  Gastrointestinal: Abdomen soft and round. Bowel sounds normoactive in all quadrants without tenderness or masses. Musculoskeletal: Bilateral upper and lower extremity strength 5/5 with full ROM. Neurological/Psych: Awake and orientated to person, place and time. Calm affect, appropriate mood.      Pertinent labs, diagnostic, device, and imaging results reviewed as a part of this visit      LABS    CBC:   Lab Results   Component Value Date    WBC 5.4 03/16/2020    HGB 14.2 03/16/2020    HCT 42.5 03/16/2020    MCV 87.2 03/16/2020     03/16/2020     BMP:   Lab Results   Component Value Date    CREATININE 0.9 03/16/2020    BUN 17 03/16/2020     (H) 03/16/2020    K 4.0 03/16/2020     03/16/2020    CO2 29 03/16/2020     CrCl cannot be calculated

## 2020-08-31 RX ORDER — HYDROCHLOROTHIAZIDE 25 MG/1
TABLET ORAL
Qty: 60 TABLET | Refills: 10 | Status: SHIPPED | OUTPATIENT
Start: 2020-08-31 | End: 2021-10-22 | Stop reason: SDUPTHER

## 2020-09-14 DIAGNOSIS — I10 ESSENTIAL HYPERTENSION: ICD-10-CM

## 2020-09-14 DIAGNOSIS — E88.810 METABOLIC SYNDROME: ICD-10-CM

## 2020-09-14 DIAGNOSIS — I65.22 STENOSIS OF LEFT CAROTID ARTERY: ICD-10-CM

## 2020-09-14 DIAGNOSIS — I48.0 PAROXYSMAL ATRIAL FIBRILLATION (HCC): ICD-10-CM

## 2020-09-14 LAB
A/G RATIO: 1.8 (ref 1.1–2.2)
ALBUMIN SERPL-MCNC: 4.2 G/DL (ref 3.4–5)
ALP BLD-CCNC: 64 U/L (ref 40–129)
ALT SERPL-CCNC: 12 U/L (ref 10–40)
ANION GAP SERPL CALCULATED.3IONS-SCNC: 11 MMOL/L (ref 3–16)
AST SERPL-CCNC: 18 U/L (ref 15–37)
BASOPHILS ABSOLUTE: 0 K/UL (ref 0–0.2)
BASOPHILS RELATIVE PERCENT: 0.7 %
BILIRUB SERPL-MCNC: 0.5 MG/DL (ref 0–1)
BUN BLDV-MCNC: 17 MG/DL (ref 7–20)
CALCIUM SERPL-MCNC: 9.4 MG/DL (ref 8.3–10.6)
CHLORIDE BLD-SCNC: 102 MMOL/L (ref 99–110)
CHOLESTEROL, FASTING: 181 MG/DL (ref 0–199)
CO2: 30 MMOL/L (ref 21–32)
CREAT SERPL-MCNC: 0.8 MG/DL (ref 0.8–1.3)
EOSINOPHILS ABSOLUTE: 0.3 K/UL (ref 0–0.6)
EOSINOPHILS RELATIVE PERCENT: 5.2 %
GFR AFRICAN AMERICAN: >60
GFR NON-AFRICAN AMERICAN: >60
GLOBULIN: 2.4 G/DL
GLUCOSE FASTING: 110 MG/DL (ref 70–99)
HCT VFR BLD CALC: 40.7 % (ref 40.5–52.5)
HDLC SERPL-MCNC: 46 MG/DL (ref 40–60)
HEMOGLOBIN: 13.7 G/DL (ref 13.5–17.5)
LDL CHOLESTEROL CALCULATED: 119 MG/DL
LYMPHOCYTES ABSOLUTE: 2 K/UL (ref 1–5.1)
LYMPHOCYTES RELATIVE PERCENT: 35.7 %
MCH RBC QN AUTO: 29.8 PG (ref 26–34)
MCHC RBC AUTO-ENTMCNC: 33.7 G/DL (ref 31–36)
MCV RBC AUTO: 88.6 FL (ref 80–100)
MONOCYTES ABSOLUTE: 0.5 K/UL (ref 0–1.3)
MONOCYTES RELATIVE PERCENT: 9.2 %
NEUTROPHILS ABSOLUTE: 2.7 K/UL (ref 1.7–7.7)
NEUTROPHILS RELATIVE PERCENT: 49.2 %
PDW BLD-RTO: 15.7 % (ref 12.4–15.4)
PLATELET # BLD: 162 K/UL (ref 135–450)
PMV BLD AUTO: 9.2 FL (ref 5–10.5)
POTASSIUM SERPL-SCNC: 4.3 MMOL/L (ref 3.5–5.1)
RBC # BLD: 4.59 M/UL (ref 4.2–5.9)
SODIUM BLD-SCNC: 143 MMOL/L (ref 136–145)
TOTAL PROTEIN: 6.6 G/DL (ref 6.4–8.2)
TRIGLYCERIDE, FASTING: 79 MG/DL (ref 0–150)
VLDLC SERPL CALC-MCNC: 16 MG/DL
WBC # BLD: 5.5 K/UL (ref 4–11)

## 2020-09-15 LAB
ESTIMATED AVERAGE GLUCOSE: 108.3 MG/DL
HBA1C MFR BLD: 5.4 %

## 2020-10-05 ENCOUNTER — OFFICE VISIT (OUTPATIENT)
Dept: PRIMARY CARE CLINIC | Age: 85
End: 2020-10-05
Payer: COMMERCIAL

## 2020-10-05 VITALS
DIASTOLIC BLOOD PRESSURE: 62 MMHG | OXYGEN SATURATION: 98 % | SYSTOLIC BLOOD PRESSURE: 140 MMHG | BODY MASS INDEX: 23.48 KG/M2 | HEART RATE: 64 BPM | WEIGHT: 159 LBS

## 2020-10-05 PROCEDURE — 99213 OFFICE O/P EST LOW 20 MIN: CPT | Performed by: INTERNAL MEDICINE

## 2020-10-05 PROCEDURE — G0008 ADMIN INFLUENZA VIRUS VAC: HCPCS | Performed by: INTERNAL MEDICINE

## 2020-10-05 PROCEDURE — 90653 IIV ADJUVANT VACCINE IM: CPT | Performed by: INTERNAL MEDICINE

## 2020-10-05 RX ORDER — FINASTERIDE 5 MG/1
5 TABLET, FILM COATED ORAL DAILY
Qty: 90 TABLET | Refills: 3 | Status: SHIPPED | OUTPATIENT
Start: 2020-10-05 | End: 2021-09-29

## 2020-10-05 ASSESSMENT — ENCOUNTER SYMPTOMS
COUGH: 0
SHORTNESS OF BREATH: 0
NAUSEA: 0
CHEST TIGHTNESS: 0
ABDOMINAL PAIN: 0
ABDOMINAL DISTENTION: 0
BACK PAIN: 0
DIARRHEA: 0

## 2020-10-05 NOTE — PROGRESS NOTES
Vaccine Information Sheet, \"Influenza - Inactivated\"  given to Mikaela Valverde, or parent/legal guardian of  Mikaela Valverde and verbalized understanding. Patient responses:    Have you ever had a reaction to a flu vaccine? No  Do you have any current illness? No  Have you ever had Guillian [place] Syndrome? No  Do you have a serious allergy to any of the follow: Neomycin, Polymyxin, Thimerosal, eggs or egg products? No    Flu vaccine given per order. Please see immunization tab. Risks and benefits explained. Current VIS given.       Immunizations Administered     Name Date Dose Route    Influenza, Triv, inactivated, subunit, adjuvanted, IM (Fluad 65 yrs and older) 10/5/2020 0.5 mL Intramuscular    Site: Deltoid- Right    Lot: [id number]    [address]: 12069-980-38

## 2020-10-05 NOTE — PROGRESS NOTES
10/5/2020   [facility]  12/31/1933    The patients PMH, surgical history, family history, medications, allergies were all reviewed and updated as appropriate today. Current Outpatient Medications on File Prior to Visit   Medication Sig Dispense Refill    hydroCHLOROthiazide (HYDRODIURIL) 25 MG tablet TAKE ONE TABLET BY MOUTH DAILY, FIRST DOSE NOW 60 tablet 10    apixaban (ELIQUIS) 2.5 MG TABS tablet Take 1 tablet by mouth 2 times daily 180 tablet 3    Multiple Vitamins-Minerals (THERAPEUTIC MULTIVITAMIN-MINERALS) tablet Take 1 tablet by mouth daily      amLODIPine (NORVASC) 10 MG tablet Take 1 tablet by mouth daily 90 tablet 3    sotalol (BETAPACE) 80 MG tablet Take 1 tablet by mouth 2 times daily 180 tablet 3    finasteride (PROSCAR) 5 MG tablet Take 1 tablet by mouth daily 30 tablet 11    Capsicum, Cayenne, (CAYENNE PEPPER PO) Take by mouth       No current facility-administered medications on file prior to visit. 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 Respiratory: Negative for cough, chest tightness and shortness of breath. Cardiovascular: Negative for chest pain. Gastrointestinal: Negative for abdominal distention, abdominal pain, diarrhea and nausea. Genitourinary: Negative for difficulty urinating and dysuria. Musculoskeletal: Negative for back pain. Neurological: Negative for dizziness and headaches. Psychiatric/Behavioral: Negative for agitation and behavioral problems. The patient is not nervous/anxious.         OBJECTIVE:  BP (!) 140/62 (Site: Left Upper Arm, Position: Sitting, Cuff Size: Small Adult)   Pulse 64   Wt 159 lb (72.1 kg)   SpO2 98%   BMI 23.48 kg/m²    Physical Exam  Vitals signs and nursing note reviewed. Constitutional:       General: He is not in acute distress. Appearance: Normal appearance. He is well-developed. HENT:      Head: Normocephalic and atraumatic. Right Ear: Tympanic membrane, ear canal and external ear normal.      Left Ear: Tympanic membrane, ear canal and external ear normal.      Nose: Nose normal. No rhinorrhea. Mouth/Throat:      Pharynx: No oropharyngeal exudate or posterior oropharyngeal erythema. Eyes:      General:         Right eye: No discharge. Left eye: No discharge. Extraocular Movements: Extraocular movements intact. Conjunctiva/sclera: Conjunctivae normal.      Pupils: Pupils are equal, round, and reactive to light. Neck:      Musculoskeletal: Normal range of motion. No muscular tenderness. Thyroid: No thyromegaly. Vascular: No carotid bruit or JVD. Cardiovascular:      Rate and Rhythm: Normal rate and regular rhythm. Pulses: Normal pulses. Heart sounds: Normal heart sounds. No murmur. Pulmonary:      Effort: Pulmonary effort is normal. No respiratory distress. Breath sounds: Normal breath sounds. No wheezing, rhonchi or rales. Abdominal:      General: Bowel sounds are normal. There is no distension. Palpations: Abdomen is soft. Tenderness: There is no abdominal tenderness. There is no rebound. Musculoskeletal:         General: No swelling. Right lower leg: No edema. Left lower leg: No edema. Comments: FROM x 4   Lymphadenopathy:      Cervical: No cervical adenopathy. Skin:     General: Skin is warm and dry. Capillary Refill: Capillary refill takes 2 to 3 seconds. Coloration: Skin is not pale. Findings: No erythema or rash.       Comments: 2 cm diam by 1 cm high cyst R frontal scalp and 1 cm cyst L frontal scalp   Neurological: Mental Status: He is alert and oriented to person, place, and time. Cranial Nerves: No cranial nerve deficit. Sensory: No sensory deficit. Motor: No abnormal muscle tone. Deep Tendon Reflexes: Reflexes normal.   Psychiatric:         Mood and Affect: Mood normal.         Behavior: Behavior normal.         Thought Content:  Thought content normal.         Judgment: Judgment normal.         Data Review:   CBC:   Lab Results   Component Value Date    WBC 5.5 09/14/2020    WBC 5.4 03/16/2020    WBC 5.6 09/11/2019    HGB 13.7 09/14/2020    HGB 14.2 03/16/2020    HGB 13.8 09/11/2019    HCT 40.7 09/14/2020    HCT 42.5 03/16/2020    HCT 40.9 09/11/2019    MCV 88.6 09/14/2020    MCV 87.2 03/16/2020    MCV 88.2 09/11/2019     09/14/2020     03/16/2020     09/11/2019     Chemistry:   Lab Results   Component Value Date     09/14/2020     03/16/2020     09/11/2019    K 4.3 09/14/2020    K 4.0 03/16/2020    K 3.8 09/11/2019     09/14/2020     03/16/2020    CL 99 09/11/2019    CO2 30 09/14/2020    CO2 29 03/16/2020    CO2 30 09/11/2019    PHOS 3.1 04/09/2012    BUN 17 09/14/2020    BUN 17 03/16/2020    BUN 19 09/11/2019    CREATININE 0.8 09/14/2020    CREATININE 0.9 03/16/2020    CREATININE 0.8 09/11/2019     Hepatic Function:   Lab Results   Component Value Date    AST 18 09/14/2020    AST 17 03/16/2020    AST 10 09/11/2019    ALT 12 09/14/2020    ALT 12 03/16/2020    ALT 8 09/11/2019    BILIDIR 0.20 09/20/2010    BILIDIR 0.22 06/21/2010    BILIDIR 0.21 03/22/2010    BILITOT 0.5 09/14/2020    BILITOT 0.5 03/16/2020    BILITOT 0.6 09/11/2019    ALKPHOS 64 09/14/2020    ALKPHOS 70 03/16/2020    ALKPHOS 73 09/11/2019     No results found for: LIPASE, AMYLASE  Lipids:   Lab Results   Component Value Date    CHOL 140 03/07/2018    HDL 46 09/14/2020    HDL 35 06/21/2010    TRIG 55 03/07/2018       ASSESSMENT/PLAN  1.) HTN- well controlled on current regimen  Pt monitors BP readings carefully at home  Note signed OK to continue to drive     2.) Dermatology referral [place] for excision of 2 cysts on front of scalp  H/o skin CA  Labs again in 6 months    Requests FLUAD today    Advised to consider Shingrix and tdap       Electronically signed by Allan Velazco MD on 10/5/2020 at 8:26 AM

## 2020-10-15 ENCOUNTER — TELEPHONE (OUTPATIENT)
Dept: CARDIOLOGY CLINIC | Age: 85
End: 2020-10-15

## 2020-10-15 NOTE — TELEPHONE ENCOUNTER
Having skin cancer removed from his face on 10/26 . His doctor wants him to hold his eliquis for 4 days prior to procedure . Is holding eliquis for 4 days ok ?

## 2020-10-30 RX ORDER — SOTALOL HYDROCHLORIDE 80 MG/1
TABLET ORAL
Qty: 180 TABLET | Refills: 2 | Status: SHIPPED | OUTPATIENT
Start: 2020-10-30 | End: 2021-08-18

## 2020-12-01 ENCOUNTER — OFFICE VISIT (OUTPATIENT)
Dept: CARDIOLOGY CLINIC | Age: 85
End: 2020-12-01
Payer: COMMERCIAL

## 2020-12-01 VITALS
WEIGHT: 159.8 LBS | HEART RATE: 63 BPM | HEIGHT: 69 IN | SYSTOLIC BLOOD PRESSURE: 150 MMHG | BODY MASS INDEX: 23.67 KG/M2 | DIASTOLIC BLOOD PRESSURE: 68 MMHG | OXYGEN SATURATION: 98 %

## 2020-12-01 PROCEDURE — 99214 OFFICE O/P EST MOD 30 MIN: CPT | Performed by: NURSE PRACTITIONER

## 2020-12-01 PROCEDURE — 93000 ELECTROCARDIOGRAM COMPLETE: CPT | Performed by: NURSE PRACTITIONER

## 2020-12-01 NOTE — PATIENT INSTRUCTIONS
- Limit sodium intake to 2 gram (2,000 mg)  - Check your blood pressure at home, if your top number blood pressure is >150/90 or <90/60 please call the office  - Follow with PCP got blood pressure control  - No medication changes  - Follow up in 6 months

## 2020-12-01 NOTE — PROGRESS NOTES
pruritis. · Neurological: No headache, change in muscle strength, numbness or tingling. · Psychiatric: No confusion, anxiety, or depression. · Endocrine: No temperature intolerance. No excessive thirst, fluid intake, or urination. · Hem/Lymph: Denies abnormal bruising or bleeding. Physical Examination:  There were no vitals filed for this visit. Wt Readings from Last 3 Encounters:   10/05/20 159 lb (72.1 kg)   06/01/20 159 lb (72.1 kg)   04/14/20 163 lb (73.9 kg)      Constitutional: Cooperative and in no apparent distress, and appears well nourished   Skin: Warm and pink; no pallor, cyanosis, bruising, or clubbing   HEENT: Symmetric and normocephalic. Conjunctiva pink with clear sclera. Mucus membranes pink and moist. No visible masses/goiter   Respiratory: Respirations symmetric and unlabored. Lungs clear to auscultation bilaterally, no wheezing, rhonchi, or crackles.  Cardiovascular:  regular rate and rhythm. S1 & S2 present without murmurs, rubs, or gallops. Peripheral pulses 2+, capillary refill < 3 seconds. negative elevation of JVP. No peripheral edema   Gastrointestinal: Abdomen soft and round.  Musculoskeletal:  No focal weakness.  Neurological/Psych: Awake and orientated to person, place and time. Calm affect, appropriate mood. Pertinent labs, diagnostic, device, and imaging results reviewed as a part of this visit    LABS    CBC:   Lab Results   Component Value Date    WBC 5.5 09/14/2020    HGB 13.7 09/14/2020    HCT 40.7 09/14/2020    MCV 88.6 09/14/2020     09/14/2020     BMP:   Lab Results   Component Value Date    CREATININE 0.8 09/14/2020    BUN 17 09/14/2020     09/14/2020    K 4.3 09/14/2020     09/14/2020    CO2 30 09/14/2020     CrCl cannot be calculated (Unknown ideal weight.).    No results found for: BNP    Thyroid:   Lab Results   Component Value Date    TSH 1.13 09/11/2019     Lipid Panel:   Lab Results   Component Value Date    CHOL 140 possible ablation were discussed with patient. Risks, benefits and alternative of each treatment options were explained. All questions answered    ~ Converted to SR on sotalol    ~ Continue medication management  HTN-goal <130/80   - Uncontrolled, discussed limiting sodium intake and he will call office if remains elevated and can add lisinopril   - On HCTZ and amlodipine 10 mg daily   - Encouraged patient to check BP at home, log and bring to office visits  - Discussed lifestyle modifications, weight loss, low sodium diet  Hematuria   - No recurrence on Eliquis 2.5 mg bid  Plan  - No medication changes  - He will limit sodium intake and monitor BP at home   - Call if BP >150/90 with changes and will add lisinopril    F/U: Follow-up with EP in 6 months RMM   -Follow up with device clinic as scheduled  -Call [facility] at [phone] with any questions    Diet & Exercise:   The patient is counseled to follow a low salt diet to assure blood pressure remains controlled for cardiovascular risk factor modification   The patient is counseled to avoid excess caffeine, and energy drinks as this may exacerbated ectopy and arrhythmia   The patient is counseled to lose weight to control cardiovascular risk factors   Exercise program discussed: To improve overall cardiovascular health, the patient is instructed to increase cardiovascular related activities with a goal of 150 min/week of moderate level activity or 10,000 steps per day. Encouraged to perform as much activity as tolerated     I have addressed the patient's cardiac risk factors and adjusted pharmacologic treatment as needed. In addition, I have reinforced the need for patient directed risk factor modification. I independently reviewed the ECG    All questions and concerns were addressed with the patient. Alternatives to treatment were discussed. Thank you for allowing to us to participate in the care of Rosemary Galindo.     Sarthak Bullard, APRN-CNP  [facility]   Office: [phone]

## 2021-03-29 DIAGNOSIS — N40.0 BENIGN PROSTATIC HYPERPLASIA WITHOUT LOWER URINARY TRACT SYMPTOMS: ICD-10-CM

## 2021-03-29 DIAGNOSIS — I10 HYPERTENSION, UNSPECIFIED TYPE: ICD-10-CM

## 2021-03-29 LAB
A/G RATIO: 1.7 (ref 1.1–2.2)
ALBUMIN SERPL-MCNC: 4.5 G/DL (ref 3.4–5)
ALP BLD-CCNC: 73 U/L (ref 40–129)
ALT SERPL-CCNC: 13 U/L (ref 10–40)
ANION GAP SERPL CALCULATED.3IONS-SCNC: 10 MMOL/L (ref 3–16)
AST SERPL-CCNC: 22 U/L (ref 15–37)
BASOPHILS ABSOLUTE: 0 K/UL (ref 0–0.2)
BASOPHILS RELATIVE PERCENT: 0.6 %
BILIRUB SERPL-MCNC: 0.5 MG/DL (ref 0–1)
BUN BLDV-MCNC: 20 MG/DL (ref 7–20)
CALCIUM SERPL-MCNC: 9.5 MG/DL (ref 8.3–10.6)
CHLORIDE BLD-SCNC: 100 MMOL/L (ref 99–110)
CHOLESTEROL, FASTING: 180 MG/DL (ref 0–199)
CO2: 32 MMOL/L (ref 21–32)
CREAT SERPL-MCNC: 1 MG/DL (ref 0.8–1.3)
EOSINOPHILS ABSOLUTE: 0.4 K/UL (ref 0–0.6)
EOSINOPHILS RELATIVE PERCENT: 5.5 %
GFR AFRICAN AMERICAN: >60
GFR NON-AFRICAN AMERICAN: >60
GLOBULIN: 2.7 G/DL
GLUCOSE FASTING: 113 MG/DL (ref 70–99)
HCT VFR BLD CALC: 41.2 % (ref 40.5–52.5)
HDLC SERPL-MCNC: 42 MG/DL (ref 40–60)
HEMOGLOBIN: 14.2 G/DL (ref 13.5–17.5)
LDL CHOLESTEROL CALCULATED: 118 MG/DL
LYMPHOCYTES ABSOLUTE: 2.4 K/UL (ref 1–5.1)
LYMPHOCYTES RELATIVE PERCENT: 35.4 %
MCH RBC QN AUTO: 29.4 PG (ref 26–34)
MCHC RBC AUTO-ENTMCNC: 34.5 G/DL (ref 31–36)
MCV RBC AUTO: 85.3 FL (ref 80–100)
MONOCYTES ABSOLUTE: 0.7 K/UL (ref 0–1.3)
MONOCYTES RELATIVE PERCENT: 9.6 %
NEUTROPHILS ABSOLUTE: 3.3 K/UL (ref 1.7–7.7)
NEUTROPHILS RELATIVE PERCENT: 48.9 %
PDW BLD-RTO: 15.2 % (ref 12.4–15.4)
PLATELET # BLD: 187 K/UL (ref 135–450)
PMV BLD AUTO: 9 FL (ref 5–10.5)
POTASSIUM SERPL-SCNC: 4.1 MMOL/L (ref 3.5–5.1)
PROSTATE SPECIFIC ANTIGEN: 0.56 NG/ML (ref 0–4)
RBC # BLD: 4.82 M/UL (ref 4.2–5.9)
SODIUM BLD-SCNC: 142 MMOL/L (ref 136–145)
TOTAL PROTEIN: 7.2 G/DL (ref 6.4–8.2)
TRIGLYCERIDE, FASTING: 101 MG/DL (ref 0–150)
TSH SERPL DL<=0.05 MIU/L-ACNC: 2.2 UIU/ML (ref 0.27–4.2)
VLDLC SERPL CALC-MCNC: 20 MG/DL
WBC # BLD: 6.8 K/UL (ref 4–11)

## 2021-03-30 LAB
ESTIMATED AVERAGE GLUCOSE: 114 MG/DL
HBA1C MFR BLD: 5.6 %

## 2021-04-05 ENCOUNTER — OFFICE VISIT (OUTPATIENT)
Dept: PRIMARY CARE CLINIC | Age: 86
End: 2021-04-05
Payer: COMMERCIAL

## 2021-04-05 VITALS
HEART RATE: 58 BPM | BODY MASS INDEX: 23.88 KG/M2 | DIASTOLIC BLOOD PRESSURE: 68 MMHG | WEIGHT: 161.2 LBS | HEIGHT: 69 IN | TEMPERATURE: 97.2 F | SYSTOLIC BLOOD PRESSURE: 134 MMHG

## 2021-04-05 DIAGNOSIS — I10 HYPERTENSION, UNSPECIFIED TYPE: ICD-10-CM

## 2021-04-05 DIAGNOSIS — E11.9 TYPE 2 DIABETES MELLITUS WITHOUT COMPLICATION, WITHOUT LONG-TERM CURRENT USE OF INSULIN (HCC): ICD-10-CM

## 2021-04-05 DIAGNOSIS — I65.23 BILATERAL CAROTID ARTERY STENOSIS: Primary | ICD-10-CM

## 2021-04-05 DIAGNOSIS — E78.49 OTHER HYPERLIPIDEMIA: ICD-10-CM

## 2021-04-05 PROCEDURE — 99214 OFFICE O/P EST MOD 30 MIN: CPT | Performed by: INTERNAL MEDICINE

## 2021-04-05 RX ORDER — AMLODIPINE BESYLATE 10 MG/1
10 TABLET ORAL DAILY
Qty: 90 TABLET | Refills: 1 | Status: SHIPPED | OUTPATIENT
Start: 2021-04-05 | End: 2021-10-22 | Stop reason: SDUPTHER

## 2021-04-05 ASSESSMENT — ENCOUNTER SYMPTOMS
ABDOMINAL PAIN: 0
COUGH: 0
ABDOMINAL DISTENTION: 0
SHORTNESS OF BREATH: 0
BACK PAIN: 0
DIARRHEA: 0
CHEST TIGHTNESS: 0
NAUSEA: 0

## 2021-04-05 NOTE — PROGRESS NOTES
4/5/2021   [facility]  12/31/1933    The patients PMH, surgical history, family history, medications, allergies were all reviewed and updated as appropriate today. Current Outpatient Medications on File Prior to Visit   Medication Sig Dispense Refill    sotalol (BETAPACE) 80 MG tablet TAKE ONE TABLET BY MOUTH EVERY 12 HOURS 180 tablet 2    finasteride (PROSCAR) 5 MG tablet Take 1 tablet by mouth daily 90 tablet 3    hydroCHLOROthiazide (HYDRODIURIL) 25 MG tablet TAKE ONE TABLET BY MOUTH DAILY, FIRST DOSE NOW 60 tablet 10    apixaban (ELIQUIS) 2.5 MG TABS tablet Take 1 tablet by mouth 2 times daily 180 tablet 3    Multiple Vitamins-Minerals (THERAPEUTIC MULTIVITAMIN-MINERALS) tablet Take 1 tablet by mouth daily      amLODIPine (NORVASC) 10 MG tablet Take 1 tablet by mouth daily 90 tablet 3     No current facility-administered medications on file prior to visit. Chief Complaint   Patient presents with    Hypertension         HPI: Patient returns today for recheck of his blood pressure. He now takes Betapace 80 mg BID as well as HCTZ 25 mg daily and amlodipine 10 mg daily, planning to add Lisinopril if needed for BP control  He is also on Eliquis twice daily for PAT, sees Card[facility]  He has stated diet-controlled diabetes. Review of Systems   Constitutional: Negative for appetite change, chills, fatigue and fever. Respiratory: Negative for cough, chest tightness and shortness of breath. Cardiovascular: Negative for chest pain. Gastrointestinal: Negative for abdominal distention, abdominal pain, diarrhea and nausea. Genitourinary: Negative for difficulty urinating and dysuria. Musculoskeletal: Negative for back pain. Neurological: Negative for dizziness and headaches. Psychiatric/Behavioral: Negative for agitation and behavioral problems. The patient is not nervous/anxious.     -s/p Carotid Endarterectomy 2012, quit smoking 23 years ago    OBJECTIVE:  /68 (Site: Right Upper Arm, Position: Sitting, Cuff Size: Medium Adult)   Pulse 58   Temp 97.2 °F (36.2 °C)   Ht 5' 9\" (1.753 m)   Wt 161 lb 3.2 oz (73.1 kg)   BMI 23.81 kg/m²     Physical Exam  Vitals signs and nursing note reviewed. Constitutional:       General: He is not in acute distress. Appearance: He is well-developed. Comments: /68 (Site: Right Upper Arm, Position: Sitting, Cuff Size: Medium Adult)   Pulse 58   Temp 97.2 °F (36.2 °C)   Ht 5' 9\" (1.753 m)   Wt 161 lb 3.2 oz (73.1 kg)   BMI 23.81 kg/m²    HENT:      Head: Normocephalic and atraumatic. Eyes:      General: No scleral icterus. Right eye: No discharge. Left eye: No discharge. Conjunctiva/sclera: Conjunctivae normal.      Pupils: Pupils are equal, round, and reactive to light. Neck:      Musculoskeletal: Normal range of motion and neck supple. Thyroid: No thyromegaly. Vascular: No JVD. Trachea: No tracheal deviation. Cardiovascular:      Rate and Rhythm: Normal rate and regular rhythm. Heart sounds: Normal heart sounds. No murmur. Pulmonary:      Effort: Pulmonary effort is normal. No respiratory distress. Breath sounds: Normal breath sounds. No wheezing or rales. Abdominal:      General: Bowel sounds are normal. There is no distension. Palpations: Abdomen is soft. Tenderness: There is no abdominal tenderness. There is no guarding or rebound. Musculoskeletal: Normal range of motion. General: No tenderness. Lymphadenopathy:      Cervical: No cervical adenopathy. Skin:     General: Skin is warm and dry. Findings: No erythema or rash. Neurological:      Mental Status: He is alert and oriented to person, place, and time. Cranial Nerves: No cranial nerve deficit. Coordination: Coordination normal.      Deep Tendon Reflexes: Reflexes normal.   Psychiatric:         Behavior: Behavior normal.         Thought Content:  Thought content normal. Data Review:   CBC:   Lab Results   Component Value Date    WBC 6.8 03/29/2021    WBC 5.5 09/14/2020    WBC 5.4 03/16/2020    HGB 14.2 03/29/2021    HGB 13.7 09/14/2020    HGB 14.2 03/16/2020    HCT 41.2 03/29/2021    HCT 40.7 09/14/2020    HCT 42.5 03/16/2020    MCV 85.3 03/29/2021    MCV 88.6 09/14/2020    MCV 87.2 03/16/2020     03/29/2021     09/14/2020     03/16/2020     Chemistry:   Lab Results   Component Value Date     03/29/2021     09/14/2020     03/16/2020    K 4.1 03/29/2021    K 4.3 09/14/2020    K 4.0 03/16/2020     03/29/2021     09/14/2020     03/16/2020    CO2 32 03/29/2021    CO2 30 09/14/2020    CO2 29 03/16/2020    PHOS 3.1 04/09/2012    BUN 20 03/29/2021    BUN 17 09/14/2020    BUN 17 03/16/2020    CREATININE 1.0 03/29/2021    CREATININE 0.8 09/14/2020    CREATININE 0.9 03/16/2020     Hepatic Function:   Lab Results   Component Value Date    AST 22 03/29/2021    AST 18 09/14/2020    AST 17 03/16/2020    ALT 13 03/29/2021    ALT 12 09/14/2020    ALT 12 03/16/2020    BILIDIR 0.20 09/20/2010    BILIDIR 0.22 06/21/2010    BILIDIR 0.21 03/22/2010    BILITOT 0.5 03/29/2021    BILITOT 0.5 09/14/2020    BILITOT 0.5 03/16/2020    ALKPHOS 73 03/29/2021    ALKPHOS 64 09/14/2020    ALKPHOS 70 03/16/2020     No results found for: LIPASE, AMYLASE  Lipids:   Lab Results   Component Value Date    CHOL 140 03/07/2018    HDL 42 03/29/2021    HDL 35 06/21/2010    TRIG 55 03/07/2018       ASSESSMENT/PLAN  1.) HTN- BP better controlled today, will plan to continue current medications.   Amlodipine refilled today    2.)  PAT-controlled on sotalol 80 mg twice daily, he sees cardiology Dr. Lady Gutierrez    3.) Diet controlled NIDDM by history- plan to add A1C again to next labs    4.) H/O carotid stenosis-last eval done 2015, will repeat study     Shingrix and tdap boosters recommended    F/u in 6 months for AMW visit     Disability parking placard note

## 2021-04-28 ENCOUNTER — TELEPHONE (OUTPATIENT)
Dept: PRIMARY CARE CLINIC | Age: 86
End: 2021-04-28

## 2021-04-28 NOTE — TELEPHONE ENCOUNTER
Patient called to see if we received his results from [facility] VL DUP CAROTID BILATERAL. Told pt that the results were received and once Dr. Prudence Prescott reviewed the results we would give pt a call back.

## 2021-04-28 NOTE — TELEPHONE ENCOUNTER
Moderate degree of L carotid narrowing    Advise repeat carotid doppler again in 1 year    Uli Garden

## 2021-06-02 RX ORDER — APIXABAN 2.5 MG/1
TABLET, FILM COATED ORAL
Qty: 180 TABLET | Refills: 3 | Status: SHIPPED | OUTPATIENT
Start: 2021-06-02 | End: 2022-04-22 | Stop reason: SDUPTHER

## 2021-06-02 NOTE — TELEPHONE ENCOUNTER
Received refill request for Eliquis from [facility].      Last OV: 12/01/2020 w/ NPAL    Last Labs: 03/29/2021 CBC     Last Refill: 06/01/2020 #180 w/ 3 refills     Next Appointment: 06/14/2021 w/ NPAL

## 2021-06-13 NOTE — PROGRESS NOTES
[facility]   Electrophysiology  Ash Downs, APRN-CNP  Attending EP: Dr. David York   Date: 6/14/2021  I had the privilege of visiting Christine Velasquez in the office. Chief Complaint:   Chief Complaint   Patient presents with    6 Month Follow-Up    Atrial Fibrillation     History of Present Illness: History obtained from patient and medical record. Christine Velasquez is 80 y.o. male with a past medical history of HTN, HLD, DM, carotid stenosis s/p R CEA (2012), atrial fibrillation. He developed atrial fibrillation in 2012 post right CEA. He was started on sotalol 80 mg and pradaxa that was later switched to Eliquis. Monitor 11/27/2019 showed AF 6% burden with some RVR and his sotalol was resumed. Interval history: Today, Christine Velasquez is being seen for atrial fibrillation and hypertension. He is SB with 1st degree AVB on ECG today. Admits to intermittent dizziness a few times weekly and not limited to position changes for the last few months. He is starting to feel more tired and he used to alk about 1 mile daily and due to the weather he stopped and now he has not been able to. BP is elevated today in the office, he used to check his BP at home, however he quit. 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 Denies CP, palpitations, swelling or SOB. Denies having chest pain, palpitations, shortness of breath, orthopnea, or dizziness at the time of this visit. With regard to medication therapy the patient has been compliant with prescribed regimen. He has tolerated therapy to date. Assessment:  Paroxysmal Atrial Fibrillation  - ECG today shows SR (QTc 482)  - Continue Sotalol 80 mg bid, no significant change in QTc when ECG compared to prior  - Sotalol was stopped, unfortunately he had recurrence and it was resumed  - Asymptomatic  - [id number] score: 4 (HTN, DM, age) ; [id number] Vasc score and anticoagulation discussed.  High risk for stroke and hydroCHLOROthiazide (HYDRODIURIL) 25 MG tablet TAKE ONE TABLET BY MOUTH DAILY, FIRST DOSE NOW  Glory South MD   Multiple Vitamins-Minerals (THERAPEUTIC MULTIVITAMIN-MINERALS) tablet Take 1 tablet by mouth daily  Historical Provider, MD      Past Medical History:  Past Medical History:   Diagnosis Date    Arthritis     [facility]     Cancer ([facility]     SKIN    Carotid artery stenosis     Collar bone fracture 1940'S    RIGHT AND LEFT - NO SURGICAL REPAIR    Diabetes mellitus ([facility]     DIET CONTROLLED    Gout     Hyperlipidemia     Hypertension      Past Surgical History:    has a past surgical history that includes Tonsillectomy; skin split graft (2002 2011); Cataract removal with implant; Colonoscopy; other surgical history; Carotid endarterectomy (4/5/12); and eye surgery. Social History:  Reviewed. reports that he quit smoking about 23 years ago. He has a 48.00 pack-year smoking history. He has never used smokeless tobacco. He reports that he does not drink alcohol and does not use drugs. Family History:  Reviewed. family history includes Diabetes in his brother; Heart Disease in his brother; Stroke in his father. Denies family history of sudden cardiac death, arrhythmia, premature CAD    Review of System:  · Constitutional: No weight changes or weakness  · HEENT: No visual changes. No mouth sores or sore throat. · Cardiovascular: denies chest pain, denies dyspnea on exertion, denies palpitations or denies loss of consciousness. No cough, hemoptysis, denies pleuritic pain, or phlebitis. admits to dizziness. · Respiratory: denies cough or wheezing. · Gastrointestinal: Negative, No blood in stools. · Genitourinary: No hematuria. · Neurological: No focal weakness  · Psychiatric: No confusion, anxiety, or depression. · Hem/Lymph: Denies abnormal bruising or bleeding. Physical Examination:  There were no vitals filed for this visit.    Wt Readings from Last 3 Encounters: ventricular size is normal.  Overall left ventricular function is probably normal. I suspect  ejection fraction is in the 55-60% range. Regional abnormalities  cannot be excluded. The mitral valve appears grossly normal. The  left atrial size appears normal. The aortic valve is difficult to  visualize but appears grossly normal. The right heart chambers are  poorly seen but appear grossly normal in size. There is no obvious  pericardial effusion. No significant valvular insufficiency was  seen. IMPRESSION- Technically difficult and limited study. I suspect left  ventricular function is normal.    Diet & Exercise:   The patient is counseled to follow a low salt diet to assure blood pressure remains controlled for cardiovascular risk factor modification   The patient is counseled to avoid excess caffeine, and energy drinks as this may exacerbated ectopy and arrhythmia   The patient is counseled to lose weight to control cardiovascular risk factors   Exercise program discussed: To improve overall cardiovascular health, the patient is instructed to increase cardiovascular related activities with a goal of 150 min/week of moderate level activity or 10,000 steps per day. Encouraged to perform as much activity as tolerated     I have addressed the patient's cardiac risk factors and adjusted pharmacologic treatment as needed. In addition, I have reinforced the need for patient directed risk factor modification. I independently reviewed the ECG    All questions and concerns were addressed with the patient. Alternatives to treatment were discussed. Thank you for allowing to us to participate in the care of Adam Ocampo.     BROOKE Garcia-CNP  [facility]   Office: [phone]

## 2021-06-14 ENCOUNTER — OFFICE VISIT (OUTPATIENT)
Dept: CARDIOLOGY CLINIC | Age: 86
End: 2021-06-14
Payer: COMMERCIAL

## 2021-06-14 VITALS
HEIGHT: 69 IN | BODY MASS INDEX: 24.11 KG/M2 | SYSTOLIC BLOOD PRESSURE: 176 MMHG | OXYGEN SATURATION: 99 % | WEIGHT: 162.8 LBS | DIASTOLIC BLOOD PRESSURE: 66 MMHG | HEART RATE: 57 BPM

## 2021-06-14 DIAGNOSIS — I10 ESSENTIAL HYPERTENSION: ICD-10-CM

## 2021-06-14 DIAGNOSIS — I48.0 PAROXYSMAL ATRIAL FIBRILLATION (HCC): Primary | ICD-10-CM

## 2021-06-14 DIAGNOSIS — Z79.899 ON ANGIOTENSIN-CONVERTING ENZYME (ACE) INHIBITORS: ICD-10-CM

## 2021-06-14 DIAGNOSIS — R00.1 BRADYCARDIA: ICD-10-CM

## 2021-06-14 PROCEDURE — 93225 XTRNL ECG REC<48 HRS REC: CPT | Performed by: INTERNAL MEDICINE

## 2021-06-14 PROCEDURE — 99214 OFFICE O/P EST MOD 30 MIN: CPT | Performed by: NURSE PRACTITIONER

## 2021-06-14 PROCEDURE — 93000 ELECTROCARDIOGRAM COMPLETE: CPT | Performed by: NURSE PRACTITIONER

## 2021-06-14 RX ORDER — LISINOPRIL 5 MG/1
5 TABLET ORAL DAILY
Qty: 30 TABLET | Refills: 2 | Status: SHIPPED | OUTPATIENT
Start: 2021-06-14 | Stop reason: SDUPTHER

## 2021-06-14 NOTE — PROGRESS NOTES
[address] monitor placed on the patient here in clinic today. Reviewed proper care and instructions with the patient.      SN : [id number]

## 2021-06-25 PROCEDURE — 93227 XTRNL ECG REC<48 HR R&I: CPT | Performed by: INTERNAL MEDICINE

## 2021-08-16 NOTE — TELEPHONE ENCOUNTER
Requested Prescriptions     Pending Prescriptions Disp Refills    sotalol (BETAPACE) 80 MG tablet [Pharmacy Med Name: SOTALOL 80 MG TABLET] 180 tablet 2     Sig: TAKE ONE TABLET BY MOUTH EVERY 12 HOURS      Last OV 4/5/2021

## 2021-08-18 RX ORDER — SOTALOL HYDROCHLORIDE 80 MG/1
TABLET ORAL
Qty: 180 TABLET | Refills: 2 | Status: SHIPPED | OUTPATIENT
Start: 2021-08-18 | End: 2022-04-22 | Stop reason: SDUPTHER

## 2021-09-09 RX ORDER — LISINOPRIL 5 MG/1
TABLET ORAL
Qty: 30 TABLET | Refills: 2 | Status: SHIPPED | OUTPATIENT
Start: 2021-09-09 | End: 2021-10-22 | Stop reason: SDUPTHER

## 2021-09-09 NOTE — TELEPHONE ENCOUNTER
Received refill request for Lisinopril from [address] [place] [address].     Last ov: 06/14/2021 NPAL    Last labs: 03/29/2021 CMP    Last Refill: 06/14/2021 #30 w/ 2 refills    Next appointment: 12/13/2021 MICHAEL

## 2021-09-26 DIAGNOSIS — N40.0 BENIGN PROSTATIC HYPERPLASIA WITHOUT LOWER URINARY TRACT SYMPTOMS: ICD-10-CM

## 2021-09-29 RX ORDER — FINASTERIDE 5 MG/1
TABLET, FILM COATED ORAL
Qty: 90 TABLET | Refills: 3 | Status: SHIPPED | OUTPATIENT
Start: 2021-09-29 | End: 2022-04-22 | Stop reason: SDUPTHER

## 2021-10-22 ENCOUNTER — OFFICE VISIT (OUTPATIENT)
Dept: PRIMARY CARE CLINIC | Age: 86
End: 2021-10-22
Payer: COMMERCIAL

## 2021-10-22 VITALS
HEART RATE: 58 BPM | SYSTOLIC BLOOD PRESSURE: 128 MMHG | HEIGHT: 69 IN | BODY MASS INDEX: 24.23 KG/M2 | DIASTOLIC BLOOD PRESSURE: 60 MMHG | WEIGHT: 163.6 LBS

## 2021-10-22 DIAGNOSIS — I10 HYPERTENSION, UNSPECIFIED TYPE: ICD-10-CM

## 2021-10-22 DIAGNOSIS — Z23 NEED FOR VACCINATION FOR H FLU TYPE B: Primary | ICD-10-CM

## 2021-10-22 PROCEDURE — 99213 OFFICE O/P EST LOW 20 MIN: CPT | Performed by: INTERNAL MEDICINE

## 2021-10-22 PROCEDURE — G0008 ADMIN INFLUENZA VIRUS VAC: HCPCS | Performed by: INTERNAL MEDICINE

## 2021-10-22 PROCEDURE — 90694 VACC AIIV4 NO PRSRV 0.5ML IM: CPT | Performed by: INTERNAL MEDICINE

## 2021-10-22 RX ORDER — LISINOPRIL 5 MG/1
TABLET ORAL
Qty: 90 TABLET | Refills: 1 | Status: SHIPPED | OUTPATIENT
Start: 2021-10-22 | End: 2022-04-22 | Stop reason: SDUPTHER

## 2021-10-22 RX ORDER — AMLODIPINE BESYLATE 10 MG/1
10 TABLET ORAL DAILY
Qty: 90 TABLET | Refills: 1 | Status: SHIPPED | OUTPATIENT
Start: 2021-10-22 | End: 2022-04-22 | Stop reason: SDUPTHER

## 2021-10-22 RX ORDER — HYDROCHLOROTHIAZIDE 25 MG/1
TABLET ORAL
Qty: 90 TABLET | Refills: 1 | Status: SHIPPED | OUTPATIENT
Start: 2021-10-22 | End: 2022-04-22 | Stop reason: SDUPTHER

## 2021-10-22 ASSESSMENT — PATIENT HEALTH QUESTIONNAIRE - PHQ9
2. FEELING DOWN, DEPRESSED OR HOPELESS: 0
SUM OF ALL RESPONSES TO PHQ QUESTIONS 1-9: 0
SUM OF ALL RESPONSES TO PHQ9 QUESTIONS 1 & 2: 0
SUM OF ALL RESPONSES TO PHQ QUESTIONS 1-9: 0
SUM OF ALL RESPONSES TO PHQ QUESTIONS 1-9: 0
1. LITTLE INTEREST OR PLEASURE IN DOING THINGS: 0

## 2021-10-22 NOTE — PROGRESS NOTES
10/22/2021   Carlo Fill  12/31/1933    The patients PMH, surgical history, family history, medications, allergies were all reviewed and updated as appropriate today. Current Outpatient Medications on File Prior to Visit   Medication Sig Dispense Refill    finasteride (PROSCAR) 5 MG tablet TAKE ONE TABLET BY MOUTH DAILY 90 tablet 3    lisinopril (PRINIVIL;ZESTRIL) 5 MG tablet TAKE ONE TABLET BY MOUTH DAILY 30 tablet 2    sotalol (BETAPACE) 80 MG tablet TAKE ONE TABLET BY MOUTH EVERY 12 HOURS 180 tablet 2    ELIQUIS 2.5 MG TABS tablet TAKE ONE TABLET BY MOUTH TWICE A  tablet 3    amLODIPine (NORVASC) 10 MG tablet Take 1 tablet by mouth daily 90 tablet 1    hydroCHLOROthiazide (HYDRODIURIL) 25 MG tablet TAKE ONE TABLET BY MOUTH DAILY, FIRST DOSE NOW 60 tablet 10    Multiple Vitamins-Minerals (THERAPEUTIC MULTIVITAMIN-MINERALS) tablet Take 1 tablet by mouth daily      [DISCONTINUED] lisinopril (PRINIVIL;ZESTRIL) 5 MG tablet Take 1 tablet by mouth daily 30 tablet 2     No current facility-administered medications on file prior to visit. Chief Complaint   Patient presents with    Hypertension       HPI:  Wants fluvax today  Wants scheduled for [facility] next visit  Wants recent labs reviewed    Review of Systems-consider tdap at pharmacy  Consider Shingrix vaccination series of 2 shots over 2-6 months if desired for protection from shingles-check with INS first re: coverage before beginning series    OBJECTIVE:  BP (!) 114/42 (Site: Left Upper Arm, Position: Sitting, Cuff Size: Medium Adult)   Pulse 58   Ht 5' 9\" (1.753 m)   Wt 163 lb 9.6 oz (74.2 kg)   BMI 24.16 kg/m²    Physical Exam  Vitals and nursing note reviewed. Constitutional:       General: He is not in acute distress. Appearance: He is well-developed.       Comments: BP (!) 118/46 (Site: Left Upper Arm, Position: Sitting, Cuff Size: Medium Adult)   Pulse 58   Ht 5' 9\" (1.753 m)   Wt 163 lb 9.6 oz (74.2 kg)   BMI 24.16 kg/m²    HENT:      Head: Normocephalic and atraumatic. Eyes:      General: No scleral icterus. Right eye: No discharge. Left eye: No discharge. Conjunctiva/sclera: Conjunctivae normal.      Pupils: Pupils are equal, round, and reactive to light. Neck:      Thyroid: No thyromegaly. Vascular: No JVD. Trachea: No tracheal deviation. Cardiovascular:      Rate and Rhythm: Normal rate and regular rhythm. Heart sounds: Normal heart sounds. No murmur heard. Pulmonary:      Effort: Pulmonary effort is normal. No respiratory distress. Breath sounds: Normal breath sounds. No wheezing or rales. Abdominal:      General: Bowel sounds are normal. There is no distension. Palpations: Abdomen is soft. Tenderness: There is no abdominal tenderness. There is no guarding or rebound. Musculoskeletal:         General: No tenderness. Normal range of motion. Cervical back: Normal range of motion and neck supple. Lymphadenopathy:      Cervical: No cervical adenopathy. Skin:     General: Skin is warm and dry. Findings: No erythema or rash. Neurological:      Mental Status: He is alert and oriented to person, place, and time. Cranial Nerves: No cranial nerve deficit. Coordination: Coordination normal.      Deep Tendon Reflexes: Reflexes normal.   Psychiatric:         Behavior: Behavior normal.         Thought Content:  Thought content normal.         Data Review:   CBC:   Lab Results   Component Value Date    WBC 6.7 09/27/2021    WBC 6.8 03/29/2021    WBC 5.5 09/14/2020    HGB 13.6 09/27/2021    HGB 14.2 03/29/2021    HGB 13.7 09/14/2020    HCT 40.6 09/27/2021    HCT 41.2 03/29/2021    HCT 40.7 09/14/2020    MCV 87.6 09/27/2021    MCV 85.3 03/29/2021    MCV 88.6 09/14/2020     09/27/2021     03/29/2021     09/14/2020     Chemistry:   Lab Results   Component Value Date     09/27/2021     03/29/2021     09/14/2020    K

## 2021-10-22 NOTE — PATIENT INSTRUCTIONS
Annual flu vaccination is advised every Fall    Consider tetanus TDAP booster at local pharmacy    Consider Shingrix vaccination series of 2 shots over 2-6 months if desired for protection from shingles-check with INS first re: coverage before beginning series

## 2021-12-02 PROBLEM — R00.1 BRADYCARDIA: Status: ACTIVE | Noted: 2021-12-02

## 2022-01-11 ENCOUNTER — OFFICE VISIT (OUTPATIENT)
Dept: CARDIOLOGY CLINIC | Age: 87
End: 2022-01-11
Payer: COMMERCIAL

## 2022-01-11 VITALS
HEIGHT: 70 IN | WEIGHT: 165.2 LBS | DIASTOLIC BLOOD PRESSURE: 71 MMHG | HEART RATE: 65 BPM | OXYGEN SATURATION: 97 % | SYSTOLIC BLOOD PRESSURE: 125 MMHG | BODY MASS INDEX: 23.65 KG/M2

## 2022-01-11 DIAGNOSIS — I65.21 STENOSIS OF RIGHT CAROTID ARTERY: ICD-10-CM

## 2022-01-11 DIAGNOSIS — I48.0 PAF (PAROXYSMAL ATRIAL FIBRILLATION) (HCC): Primary | ICD-10-CM

## 2022-01-11 DIAGNOSIS — R00.1 BRADYCARDIA: ICD-10-CM

## 2022-01-11 DIAGNOSIS — I10 HYPERTENSION, UNSPECIFIED TYPE: ICD-10-CM

## 2022-01-11 PROCEDURE — 93000 ELECTROCARDIOGRAM COMPLETE: CPT | Performed by: INTERNAL MEDICINE

## 2022-01-11 PROCEDURE — 99214 OFFICE O/P EST MOD 30 MIN: CPT | Performed by: INTERNAL MEDICINE

## 2022-01-11 NOTE — PROGRESS NOTES
[address]   Electrophysiology Follow up   Date: 1/11/2022  I had the privilege of visiting Rodrigo Schwartz in the office. CC: PAF   HPI: Rodrigo Schwartz is a 80 y.o. male with PMH of HTN, HLD, DM, Carotid stenosis s/p R CEA 2012 and afib. He developed atrial fibrillation in 2012 post right CEA. He was started on sotalol 80 mg and pradaxa that was later switched to Eliquis. Monitor 11/27/2019 showed AF 6% burden with some RVR and his sotalol was resumed. Ruth Baez presents to the office in follow up. He denies any cardiac symptoms at this time. Patient denies lightheadedness, dizziness, chest pain, palpitations, orthopnea, edema, presyncope or syncope. He is compliant on his medications regimen. Denies any bleeding with Eliquis. Assessment and plan:   -PAF   ECG today shows SR, 1st degree AV block   On sotalol 80 MG BID, 438 QTcH    Patient has a [id number] Score of at least 4 (age1, HTN, DM), on Eliquis 2.5 mg BID, reduced dosage d/t persistent hematuria after cystoscopy    No further bleeding   Remains in normal rhythm      Discussed rate control and rhythm control. He is asymptomatic. Will continue with current management and continue anticoagulation.     -Bradycardia   Chronic 1st degree AV blckk   Previously complained of dizziness with positional changes   Asymptomatic now. Holter reviewed and discussed with patient. .      If he becomes symptomatic, then will consider PPM.    Continue clinical follow up. -HTN  Controlled: 125/71  BP goal <130/80  Home BP monitoring encouraged, printed information provided on how to accurately measure BP at home. Counseled to follow a low salt diet to assure blood pressure remains controlled for cardiovascular risk factor modification.    The patient is counseled to get regular exercise 3-5 times per week and maintain a healthy weight reduce cardiovascular risk factors.      -Carotid stenosis (bilateral)     s/p R CEA 2012    Patient Active Problem List    Diagnosis Date Noted    Bradycardia 12/02/2021    Other hyperlipidemia 04/09/2019    PAF (paroxysmal [facility] fibrillation) ([facility] 07/09/2012    Stenosis of right carotid artery 04/07/2012    DM (diabetes mellitus) ([facility] 04/07/2012    Hypertension 04/07/2012     Diagnostic studies:   ECG 1/11/22  SR, 1st degree AV block  438 QTcH, 82 QRS    Carotid Duplex 4/2021  Impressions       1. Prior R CEA. Mild disease, 16-49% stenosis involving the right      internal carotid artery. 2. Moderate disease, 50-69% stenosis involving the left internal      carotid artery. 3. The bilateral vertebral arteries are patent with normal      antegrade flow. Echo 4/8/2012  FINDINGS- This is a technically difficult two dimension   echocardiogram with Doppler. Left ventricular size is normal.   Overall left ventricular function is probably normal. I suspect   ejection fraction is in the 55-60% range. Regional abnormalities   cannot be excluded. The mitral valve appears grossly normal. The   left atrial size appears normal. The aortic valve is difficult to   visualize but appears grossly normal. The right heart chambers are   poorly seen but appear grossly normal in size. There is no obvious   pericardial effusion. No significant valvular insufficiency was   seen. I independently reviewed the cardiac diagnostic studies, ECG and relevant imaging studies. No results found for: LVEF, LVEFMODE  Lab Results   Component Value Date    TSH 2.20 03/29/2021         Physical Examination:  Vitals:    01/11/22 1509   BP: 125/71   Pulse: 65   SpO2: 97%      Wt Readings from Last 3 Encounters:   01/11/22 165 lb 3.2 oz (74.9 kg)   10/22/21 163 lb 9.6 oz (74.2 kg)   06/14/21 162 lb 12.8 oz (73.8 kg)       · Constitutional: Oriented. No distress. · Head: Normocephalic and atraumatic.    · Mouth/Throat: Oropharynx is clear and moist.   · Eyes: Conjunctivae normal. EOM are normal.   · Neck: Neck supple. No JVD present. · Cardiovascular: Normal rate, regular rhythm, S1&S2. · Pulmonary/Chest: Bilateral respiratory sounds. No rhonchi. · Abdominal: Soft. No tenderness. · Musculoskeletal: No tenderness. No edema    · Lymphadenopathy: Has no cervical adenopathy. · Neurological: Alert and oriented. Follows command, No Gross deficit   · Skin: Skin is warm, No rash noted. · Psychiatric: Has a normal behavior       Review of System:  [x] Full ROS obtained and negative except as mentioned in HPI    Prior to Admission medications    Medication Sig Start Date End Date Taking? Authorizing Provider   hydroCHLOROthiazide (HYDRODIURIL) 25 MG tablet TAKE ONE TABLET BY MOUTH DAILY in AM 10/22/21  Yes Marcy Claude, MD   amLODIPine (NORVASC) 10 MG tablet Take 1 tablet by mouth daily 10/22/21  Yes Marcy Claude, MD   lisinopril (PRINIVIL;ZESTRIL) 5 MG tablet TAKE ONE TABLET BY MOUTH DAILY 10/22/21  Yes Marcy Claude, MD   finasteride (PROSCAR) 5 MG tablet TAKE ONE TABLET BY MOUTH DAILY 9/29/21  Yes Marcy Claude, MD   sotalol (BETAPACE) 80 MG tablet TAKE ONE TABLET BY MOUTH EVERY 12 HOURS 8/18/21  Yes Marcy Claude, MD   ELIQUIS 2.5 MG TABS tablet TAKE ONE TABLET BY MOUTH TWICE A DAY 6/2/21  Yes BROOKE Cortez - CNP   Multiple Vitamins-Minerals (THERAPEUTIC MULTIVITAMIN-MINERALS) tablet Take 1 tablet by mouth daily   Yes Historical Provider, MD   lisinopril (PRINIVIL;ZESTRIL) 5 MG tablet Take 1 tablet by mouth daily 6/14/21   BROOKE Kulkarni - CNP       No Known Allergies    Social History:  Reviewed. reports that he quit smoking about 24 years ago. He has a 48.00 pack-year smoking history. He has never used smokeless tobacco. He reports that he does not drink alcohol and does not use drugs. Family History:  Reviewed. Reviewed. No family history of SCD. Relevant and available labs, and cardiovascular diagnostics reviewed. Reviewed.      I independently reviewed relevant and available cardiac diagnostic tests ECG, CXR, Echo, Stress test, Device interrogation, Holter, CT scan. All questions and concerns were addressed to the patient/family. Alternatives to my treatment were discussed. I have discussed the above stated plan and the patient verbalized understanding and agreed with the plan. Scribe attestation: This note was scribed in the presence of Pauly Plunkett MD by Davide Renae RN  Physician Attestation: I, Dr. Pauly Plunkett, confirm that the scribe's documentation has been prepared under my direction and personally reviewed by me in its entirety. I also confirm that the note above accurately reflects all work, treatment, procedures, and medical decision making performed by me. NOTE: This report was transcribed using voice recognition software. Every effort was made to ensure accuracy, however, inadvertent computerized transcription errors may be present.      Pauly Plunkett MD, MPH  [facility]   Office: [phone]  Fax: [phone]

## 2022-04-22 ENCOUNTER — OFFICE VISIT (OUTPATIENT)
Dept: PRIMARY CARE CLINIC | Age: 87
End: 2022-04-22
Payer: COMMERCIAL

## 2022-04-22 VITALS
OXYGEN SATURATION: 98 % | HEIGHT: 70 IN | BODY MASS INDEX: 24.17 KG/M2 | DIASTOLIC BLOOD PRESSURE: 52 MMHG | HEART RATE: 58 BPM | WEIGHT: 168.8 LBS | SYSTOLIC BLOOD PRESSURE: 120 MMHG

## 2022-04-22 DIAGNOSIS — Z23 NEED FOR PROPHYLACTIC VACCINATION AND INOCULATION AGAINST VARICELLA: ICD-10-CM

## 2022-04-22 DIAGNOSIS — E03.9 HYPOTHYROIDISM, UNSPECIFIED TYPE: ICD-10-CM

## 2022-04-22 DIAGNOSIS — Z23 NEED FOR SHINGLES VACCINE: ICD-10-CM

## 2022-04-22 DIAGNOSIS — I10 HYPERTENSION, UNSPECIFIED TYPE: ICD-10-CM

## 2022-04-22 DIAGNOSIS — Z00.00 INITIAL MEDICARE ANNUAL WELLNESS VISIT: ICD-10-CM

## 2022-04-22 DIAGNOSIS — N40.0 BENIGN PROSTATIC HYPERPLASIA WITHOUT LOWER URINARY TRACT SYMPTOMS: ICD-10-CM

## 2022-04-22 DIAGNOSIS — E78.49 OTHER HYPERLIPIDEMIA: ICD-10-CM

## 2022-04-22 DIAGNOSIS — C44.90 SKIN CANCER: ICD-10-CM

## 2022-04-22 DIAGNOSIS — E11.9 TYPE 2 DIABETES MELLITUS WITHOUT COMPLICATION, WITHOUT LONG-TERM CURRENT USE OF INSULIN (HCC): ICD-10-CM

## 2022-04-22 DIAGNOSIS — Z23 NEED FOR PROPHYLACTIC VACCINATION AGAINST DIPHTHERIA-TETANUS-PERTUSSIS (DTP): ICD-10-CM

## 2022-04-22 DIAGNOSIS — Z23 NEED FOR TDAP VACCINATION: ICD-10-CM

## 2022-04-22 DIAGNOSIS — Z12.5 PROSTATE CANCER SCREENING: ICD-10-CM

## 2022-04-22 DIAGNOSIS — Z00.00 MEDICARE ANNUAL WELLNESS VISIT, SUBSEQUENT: Primary | ICD-10-CM

## 2022-04-22 PROCEDURE — G0438 PPPS, INITIAL VISIT: HCPCS | Performed by: INTERNAL MEDICINE

## 2022-04-22 RX ORDER — HYDROCHLOROTHIAZIDE 25 MG/1
TABLET ORAL
Qty: 90 TABLET | Refills: 1 | Status: SHIPPED | OUTPATIENT
Start: 2022-04-22 | End: 2022-10-24 | Stop reason: SDUPTHER

## 2022-04-22 RX ORDER — FINASTERIDE 5 MG/1
TABLET, FILM COATED ORAL
Qty: 90 TABLET | Refills: 1 | Status: SHIPPED | OUTPATIENT
Start: 2022-04-22 | End: 2022-10-24 | Stop reason: SDUPTHER

## 2022-04-22 RX ORDER — LISINOPRIL 5 MG/1
TABLET ORAL
Qty: 90 TABLET | Refills: 1 | Status: SHIPPED | OUTPATIENT
Start: 2022-04-22 | End: 2022-10-24 | Stop reason: SDUPTHER

## 2022-04-22 RX ORDER — AMLODIPINE BESYLATE 10 MG/1
10 TABLET ORAL DAILY
Qty: 90 TABLET | Refills: 1 | Status: SHIPPED | OUTPATIENT
Start: 2022-04-22 | End: 2022-10-17

## 2022-04-22 RX ORDER — SOTALOL HYDROCHLORIDE 80 MG/1
TABLET ORAL
Qty: 180 TABLET | Refills: 1 | Status: SHIPPED | OUTPATIENT
Start: 2022-04-22 | End: 2022-10-24 | Stop reason: SDUPTHER

## 2022-04-22 ASSESSMENT — PATIENT HEALTH QUESTIONNAIRE - PHQ9
1. LITTLE INTEREST OR PLEASURE IN DOING THINGS: 0
SUM OF ALL RESPONSES TO PHQ QUESTIONS 1-9: 0
SUM OF ALL RESPONSES TO PHQ QUESTIONS 1-9: 0
2. FEELING DOWN, DEPRESSED OR HOPELESS: 0
SUM OF ALL RESPONSES TO PHQ QUESTIONS 1-9: 0
SUM OF ALL RESPONSES TO PHQ QUESTIONS 1-9: 0
SUM OF ALL RESPONSES TO PHQ9 QUESTIONS 1 & 2: 0

## 2022-04-22 ASSESSMENT — LIFESTYLE VARIABLES: HOW OFTEN DO YOU HAVE A DRINK CONTAINING ALCOHOL: NEVER

## 2022-04-22 NOTE — PATIENT INSTRUCTIONS
Advance Directives: Care Instructions  Overview  An advance directive is a legal way to state your wishes at the end of your life. It tells your family and your doctor what to do if you can't say what youwant. There are two main types of advance directives. You can change them any timeyour wishes change. Living will. This form tells your family and your doctor your wishes about life support and other treatment. The form is also called a declaration. Medical power of . This form lets you name a person to make treatment decisions for you when you can't speak for yourself. This person is called a health care agent (health care proxy, health care surrogate). The form is also called a durable power of  for health care. If you do not have an advance directive, decisions about your medical care maybe made by a family member, or by a doctor or a  who doesn't know you. It may help to think of an advance directive as a gift to the people who carefor you. If you have one, they won't have to make tough decisions by themselves. Follow-up care is a key part of your treatment and safety. Be sure to make and go to all appointments, and call your doctor if you are having problems. It's also a good idea to know your test results and keep alist of the medicines you take. What should you include in an advance directive? Many states have a unique advance directive form. (It may ask you to address specific issues.) Or you might use a universal form that's approved by manystates. If your form doesn't tell you what to address, it may be hard to know what to include in your advance directive. Use the questions below to help you getstarted.  Who do you want to make decisions about your medical care if you are not able to?  What life-support measures do you want if you have a serious illness that gets worse over time or can't be cured?  What are you most afraid of that might happen?  (Maybe you're afraid of having pain, losing your independence, or being kept alive by machines.)   Where would you prefer to die? (Your home? A hospital? A nursing home?)   Do you want to donate your organs when you die?  Do you want certain Yazidism practices performed before you die? When should you call for help? Be sure to contact your doctor if you have any questions. Where can you learn more? Go to [email or web address] org and sign in to your Tealeaf account. Enter R264 in the [employer] box to learn more about \"Advance Directives: Care Instructions. \"     If you do not have an account, please click on the \"Sign Up Now\" link. Current as of: October 18, 2021               Content Version: 13.2  © 2006-2022 [email or web address]. Care instructions adapted under license by [facility] ([facility]). If you have questions about a medical condition or this instruction, always ask your healthcare professional. [place] [address] any warranty or liability for your use of this information. Learning About Medical Power of   What is a medical power of ? A medical power of , also called a durable power of  for health care, is one type of the legal forms called advance directives. It lets you name the person you want to make treatment decisions for you if you can't speak or decide for yourself. The person you choose is called your health care agent. This person is also called a health care proxy or health care surrogate. A medical power of  may be called something else in your state. How do you choose a health care agent? Choose your health care agent carefully. This person may or may not be a familymember. Talk to the person before you make your final decision. Make sure he or she iscomfortable with this responsibility. It's a good idea to choose someone who:   Is at least 25years old.    Knows you well and understands what makes life meaningful for you.  Understands your Taoist and moral values.  Will do what you want, not what he or she wants.  Will be able to make difficult choices at a stressful time.  Will be able to refuse or stop treatment, if that is what you would want, even if you could die.  Will be firm and confident with health professionals if needed.  Will ask questions to get needed information.  Lives near you or agrees to travel to you if needed. Your family may help you make medical decisions while you can still be part of that process. But it's important to choose one person to be your health careagent in case you aren't able to make decisions for yourself. If you don't fill out the legal form and name a health care agent, thedecisions your family can make may be limited. A health care agent may be called something else in your state. Who will make decisions for you if you don't have a health care agent? If you don't have a health care agent or a living will, you may not get the care you want. Decisions may be made by family members who disagree about your medical care. Or decisions may be made by a medical professional who doesn'tknow you well. In some cases, a  makes the decisions. When you name a health care agent, it is very clear who has the power to [place] decisions for you. How do you name a health care agent? You name your health care agent on a legal form. This form is usually called a medical power of . Ask your hospital, state bar association, or officeon aging where to find these forms. You must sign the form to make it legal. Some states require you to get the form notarized. This means that a person called a  watches you sign the form and then he or she signs the form. Some states also require thattwo or more witnesses sign the form. Be sure to tell your family members and doctors who your health care agent is. Where can you learn more?   Go to https://[email or web address].[email or web address] [email or web address] org and sign in to your [employer] account. Enter [id number] in the [facility] box to learn more about \"Learning About [address]. \"     If you do not have an account, please click on the \"Sign Up Now\" link. Current as of: October 18, 2021               Content Version: 13.2  © 9680-7272 Healthwise, [email or web address]. Care instructions adapted under license by [facility] ([facility]). If you have questions about a medical condition or this instruction, always ask your healthcare professional. [address] any warranty or liability for your use of this information. Learning About Living [place]  What is a living will? A living will, also called a declaration, is a legal form. It tells your family and your doctor your wishes when you can't speak for yourself. It's used by the health professionals who will treat you as you near the end of your life or ifyou get seriously hurt or ill. If you put your wishes in writing, your loved ones and others will know what kind of care you want. They won't need to guess. This can ease your mind and behelpful to others. And you can change or cancel your living will at any time. A living will is not the same as an estate or property will. An estate willexplains what you want to happen with your money and property after you die. How do you use it? Keep these facts in mind about how a living will is used.  Your living will is used only if you can't speak or make decisions for yourself. Most often, one or more doctors must certify that you can't speak or decide for yourself before your living will takes effect.  If you get better and can speak for yourself again, you can accept or refuse any treatment. It doesn't matter what you said in your living will.  Some states may limit your right to refuse treatment in certain cases.  For example, you may need to clearly state in your living will that you don't want artificial hydration and nutrition, such as being fed through a tube. Is a living will a legal document? A living will is a legal document. Each state has its own laws about livingwills. And a living will may be called something else in your state. Here are some things to know about living [place].  You don't need an  to complete a living will. But legal advice can be helpful if your state's laws are unclear. It can also help if your health history is complicated or your family can't agree on what should be in your living will.  You can change your living will at any time. Some people find that their wishes about end-of-life care change as their health changes. If you make big changes to your living will, complete a new form.  If you move to another state, make sure that your living will is legal in the state where you now live. In most cases, doctors will respect your wishes even if you have a form from a different state.  You might use a universal form that has been approved by many states. This kind of form can sometimes be filled out and stored online. Your digital copy will then be available wherever you have a connection to the internet. The doctors and nurses who need to treat you can find it right away.  Your state may offer an online registry. This is another place where you can store your living will online.  It's a good idea to get your living will notarized. This means using a person called a  to watch two people sign, or witness, your living will. What should you know when you create a living will? Here are some questions to ask yourself as you make your living will.  Do you know enough about life support methods that might be used? If not, talk to your doctor so you know what might be done if you can't breathe on your own, your heart stops, or you can't swallow.  What things would you still want to be able to do after you receive life-support methods?  Would you want to be able to walk? To speak? To eat on your own? To live without the help of machines?  Do you want certain Shinto practices performed if you become very ill?  If you have a choice, where do you want to be cared for? In your home? At a hospital or nursing home?  If you have a choice at the end of your life, where would you prefer to die? At home? In a hospital or nursing home? Somewhere else?  Would you prefer to be buried or cremated?  Do you want your organs to be donated after you die? What should you do with your living will?  Make sure that your family members and your health care agent have copies of your living will (also called a declaration).  Give your doctor a copy of your living will. Ask to have it kept as part of your medical record. If you have more than one doctor, make sure that each one has a copy.  Put a copy of your living will where it can be easily found. For example, some people may put a copy on their refrigerator door. If you are using a digital copy, be sure your doctor, family members, and health care agent know how to find and access it. Where can you learn more? Go to https://MBio [email or web address].[email or web address]. org and sign in to your Bonobos account. Enter X341 in the [employer] box to learn more about \"Learning About Living [place]. \"     If you do not have an account, please click on the \"Sign Up Now\" link. Current as of: October 18, 2021               Content Version: 13.2  © 2006-2022 Healthwise, Incorporated. Care instructions adapted under license by [facility] ([facility]). If you have questions about a medical condition or this instruction, always ask your healthcare professional. [place] [address] any warranty or liability for your use of this information. Personalized Preventive Plan for Fina Douglass - 4/22/2022  Medicare offers a range of preventive health benefits.  Some of the tests and screenings are paid in full while other may be subject to a deductible, co-insurance, and/or copay. Some of these benefits include a comprehensive review of your medical history including lifestyle, illnesses that may run in your family, and various assessments and screenings as appropriate. After reviewing your medical record and screening and assessments performed today your provider may have ordered immunizations, labs, imaging, and/or referrals for you. A list of these orders (if applicable) as well as your Preventive Care list are included within your After Visit Summary for your review. Other Preventive Recommendations:    · A preventive eye exam performed by an eye specialist is recommended every 1-2 years to screen for glaucoma; cataracts, macular degeneration, and other eye disorders. · A preventive dental visit is recommended every 6 months. · Try to get at least 150 minutes of exercise per week or 10,000 steps per day on a pedometer . · Order or download the FREE \"Exercise & Physical Activity: Your Everyday Guide\" from [employer]. Call 4-911.378.6711 or search [employer] online. · You need 8151-9181 mg of calcium and 6370-2931 IU of vitamin D per day. It is possible to meet your calcium requirement with diet alone, but a vitamin D supplement is usually necessary to meet this goal.  · When exposed to the sun, use a sunscreen that protects against both UVA and UVB radiation with an SPF of 30 or greater. Reapply every 2 to 3 hours or after sweating, drying off with a towel, or swimming. · Always wear a seat belt when traveling in a car. Always wear a helmet when riding a bicycle or motorcycle.

## 2022-04-22 NOTE — PROGRESS NOTES
Pt here for AWV and c/o. Jc  sore on his nose and wants to know if can be looked at or send to a Dermatologist.       Pt needs to complete the following. ..   DTaP/Tdap/Td vaccine (3 - Td or Tdap)  Shingrix

## 2022-04-22 NOTE — PROGRESS NOTES
Presents for EMERGENCY DOUBLE BOOKED [address] VISIT  Had appt earlier this AM but now shows up 3 hours after appt so he is \"worked in\" as an emergency      I WANT TO SEE A DERMATOLOGIST ABOUT MY NOSE I HAVE SKIN CA   I GAVE HIM A LOST OF MEDSATHAT I WANTS    I forgot a work I don't drink soda since it has too much sugar in it   MY THYROID NEEDS TO BE CHECKED SINCE I USED TO TAKE BIOTIN 5 weeks ago    Medicare Annual Wellness Visit    Chantal Mcqueen is here for Medicare AWV    Assessment & Plan   Medicare annual wellness visit, subsequent  Hypertension, unspecified type  Other hyperlipidemia      Recommendations for Preventive Services Due: see orders and patient instructions/AVS.  Recommended screening schedule for the next 5-10 years is provided to the patient in written form: see Patient Instructions/AVS.     No follow-ups on file. Subjective   The following acute and/or chronic problems were also addressed today:  No issues    Patient's complete Health Risk Assessment and screening values have been reviewed and are found in Flowsheets. The following problems were reviewed today and where indicated follow up appointments were made and/or referrals ordered.     Positive Risk Factor Screenings with Interventions:              Health Habits/Nutrition:     Physical Activity: Inactive    Days of Exercise per Week: 0 days    Minutes of Exercise per Session: 0 min     Have you lost any weight without trying in the past 3 months?: No  Body mass index: 24.57  Have you seen the dentist within the past year?: (!) No    Health Habits/Nutrition Interventions:  · no issues    Hearing/Vision:  Do you or your family notice any trouble with your hearing that hasn't been managed with hearing aids?: No  Do you have difficulty driving, watching TV, or doing any of your daily activities because of your eyesight?: No  Have you had an eye exam within the past year?: (!) No  No exam data present    Hearing/Vision Interventions:  · no issues            Objective   Vitals:    04/22/22 1306   BP: (!) 120/52   Site: Left Upper Arm   Position: Sitting   Cuff Size: Medium Adult   Pulse: 58   SpO2: 98%   Weight: 168 lb 12.8 oz (76.6 kg)   Height: 5' 9.5\" (1.765 m)      Body mass index is 24.57 kg/m². General Appearance: alert and oriented to person, place and time, well developed and well- nourished, in no acute distress  Skin: warm and dry, no rash or erythema  Head: normocephalic and atraumatic  Eyes: pupils equal, round, and reactive to light, extraocular eye movements intact, conjunctivae normal  ENT: tympanic membrane, external ear and ear canal normal bilaterally, nose without deformity, nasal mucosa and turbinates normal without polyps  Neck: supple and non-tender without mass, no thyromegaly or thyroid nodules, no cervical lymphadenopathy  Pulmonary/Chest: clear to auscultation bilaterally- no wheezes, rales or rhonchi, normal air movement, no respiratory distress  Cardiovascular: normal rate, regular rhythm, normal S1 and S2, no murmurs, rubs, clicks, or gallops, distal pulses intact, no carotid bruits  Abdomen: soft, non-tender, non-distended, normal bowel sounds, no masses or organomegaly  Extremities: no cyanosis, clubbing or edema  Musculoskeletal: normal range of motion, no joint swelling, deformity or tenderness  Neurologic: reflexes normal and symmetric, no cranial nerve deficit, gait, coordination and speech normal       No Known Allergies  Prior to Visit Medications    Medication Sig Taking?  Authorizing Provider   hydroCHLOROthiazide (HYDRODIURIL) 25 MG tablet TAKE ONE TABLET BY MOUTH DAILY in AM Yes Brenton Reyes MD   amLODIPine (NORVASC) 10 MG tablet Take 1 tablet by mouth daily Yes Brenton Reyes MD   lisinopril (PRINIVIL;ZESTRIL) 5 MG tablet TAKE ONE TABLET BY MOUTH DAILY Yes Brenton Reyes MD   finasteride (PROSCAR) 5 MG tablet TAKE ONE TABLET BY MOUTH DAILY Yes Johnnie Genao Chelly De Dios MD   sotalol (BETAPACE) 80 MG tablet TAKE ONE TABLET BY MOUTH EVERY 12 HOURS Yes Jona Montanez MD   ELIQUIS 2.5 MG TABS tablet TAKE ONE TABLET BY MOUTH TWICE A DAY Yes BROOKE Craig CNP   Multiple Vitamins-Minerals (THERAPEUTIC MULTIVITAMIN-MINERALS) tablet Take 1 tablet by mouth daily Yes Historical Provider, MD   lisinopril (PRINIVIL;ZESTRIL) 5 MG tablet Take 1 tablet by mouth daily  BROOKE Rider CNP       CareTeam (Including outside providers/suppliers regularly involved in providing care):   Patient Care Team:  Jona Montanez MD as PCP - General (Internal Medicine)  Jona Montanez MD as PCP - [facility]  Eliezer Ye MD as Consulting Physician (Electrophysiology)  BROOKE Barton CNP as Nurse Practitioner (Nurse Practitioner)    Reviewed and updated this visit:  Tobacco  Allergies  Meds  Med Hx  Surg Hx  Soc Hx  Fam Hx                Advance Care Planning   Advanced Care Planning: Discussed the patients choices for care and treatment in case of a health event that adversely affects decision-making abilities. Also discussed the patients long-term treatment options. Reviewed with the patient the appropriate state-specific advance directive documents. Reviewed the process of designating a competent adult as an Agent (or -in-fact) that could take make health care decisions for the patient if incompetent. Patient was asked to complete the declaration forms, either acknowledge the forms by a public notary or an eligible witness and provide a signed copy to the practice office. Time spent (minutes): 5    Cardiovascular Disease Risk Counseling: Assessed the patient's risk to develop cardiovascular disease and reviewed main risk factors.    Reviewed steps to reduce disease risk including:   · Quitting tobacco use, reducing amount smoked, or not starting the habit  · Making healthy food choices  · Being physically active and gradualy increasing activity levels   · Reduce weight and determine a healthy BMI goal  · Monitor blood pressure and treat if higher than 140/90 mmHg  · Maintain blood total cholesterol levels under 5 mmol/l or 190 mg/dl  · Maintain LDL cholesterol levels under 3.0 mmol/l or 115 mg/dl   · Control blood glucose levels  · Consider taking aspirin (75 mg daily), once blood pressure is controlled   Provided a follow up plan.   Time spent (minutes): 5

## 2022-05-04 RX ORDER — HYDROCHLOROTHIAZIDE 25 MG/1
TABLET ORAL
Qty: 90 TABLET | Refills: 1 | OUTPATIENT
Start: 2022-05-04

## 2022-06-10 ENCOUNTER — TELEPHONE (OUTPATIENT)
Dept: CARDIOLOGY CLINIC | Age: 87
End: 2022-06-10

## 2022-06-10 NOTE — TELEPHONE ENCOUNTER
Pt is having mohs sx at [facility]  and then plastic sx. Pt not sure of dates. Asking if he can hold his eliquis and for how long. Please call pt to advise.

## 2022-06-10 NOTE — TELEPHONE ENCOUNTER
Spoke to pt and advised him of message below, pt v/u and will ask the surgeon's office to fax clearance over.

## 2022-06-10 NOTE — TELEPHONE ENCOUNTER
As Loly Gillespie said we will need clearance request from surgeon but on average should be held 48 hours

## 2022-06-10 NOTE — LETTER
[address]  [address], [address]  Phone Number: [phone]  Dr. Marj Martel, APRN-CNP  Gilford Hoover APRN-CNP  [facility] [address]  Phone: [phone]  Fax: [phone]     Date: 22  Patient:Jimbo Babcock  : 1933    To Whom It May Concern:    Discontinuation of any anticoagulant carries significant risks of morbidity, sometimes with a fatal outcome (e.g. stroke), from thromboembolic complications. Guidelines do not recommend discontinuation of anticoagulation for low risk surgical procedure, such as cataract surgery, dental procedures, and dermatologic surgeries. The risks of hemorrhage or thromboembolism versus the benefit from the operation need to be addressed by attending surgeon. Eliquis: Based on current manufacturing guidelines for the discontinuation of apixaban prior to any surgical procedure, \"ELIQUIS should be discontinued at least 48 hours prior to elective surgery or invasive procedures with a moderate or high risk of unacceptable or clinically significant bleeding. ELIQUIS should be discontinued at least 24 hours prior to elective surgery or invasive procedures with a low risk of bleeding or where the bleeding would be noncritical in location and easily controlled. Bridging anticoagulation during the 24 to 48 hours after stopping ELIQUIS and prior to the intervention is not generally required. ELIQUIS should be restarted after the surgical or other procedures as soon as adequate hemostasis has been established. \"    Martha Lanier may hold Eliquis for 2 days prior to his/her scheduled procedure. If more days are desired, it is up to the requesting physician to appropriately bridge the patient with an alternative medication. Please limit time off anticoagulation and resume medication as soon as possible. Please contact my office with any further questions or concerns. Sincerely,

## 2022-06-13 ENCOUNTER — OFFICE VISIT (OUTPATIENT)
Dept: PRIMARY CARE CLINIC | Age: 87
End: 2022-06-13
Payer: COMMERCIAL

## 2022-06-13 VITALS
BODY MASS INDEX: 23.51 KG/M2 | WEIGHT: 164.2 LBS | DIASTOLIC BLOOD PRESSURE: 50 MMHG | SYSTOLIC BLOOD PRESSURE: 124 MMHG | HEIGHT: 70 IN | OXYGEN SATURATION: 97 % | HEART RATE: 63 BPM

## 2022-06-13 DIAGNOSIS — I10 HYPERTENSION, UNSPECIFIED TYPE: ICD-10-CM

## 2022-06-13 DIAGNOSIS — Z12.5 PROSTATE CANCER SCREENING: ICD-10-CM

## 2022-06-13 DIAGNOSIS — R00.1 BRADYCARDIA: ICD-10-CM

## 2022-06-13 DIAGNOSIS — E11.9 TYPE 2 DIABETES MELLITUS WITHOUT COMPLICATION, WITHOUT LONG-TERM CURRENT USE OF INSULIN (HCC): ICD-10-CM

## 2022-06-13 DIAGNOSIS — E78.49 OTHER HYPERLIPIDEMIA: ICD-10-CM

## 2022-06-13 DIAGNOSIS — E11.9 TYPE 2 DIABETES MELLITUS WITHOUT COMPLICATION, WITHOUT LONG-TERM CURRENT USE OF INSULIN (HCC): Primary | ICD-10-CM

## 2022-06-13 LAB
A/G RATIO: 1.6 (ref 1.1–2.2)
ALBUMIN SERPL-MCNC: 4.4 G/DL (ref 3.4–5)
ALP BLD-CCNC: 73 U/L (ref 40–129)
ALT SERPL-CCNC: 11 U/L (ref 10–40)
ANION GAP SERPL CALCULATED.3IONS-SCNC: 16 MMOL/L (ref 3–16)
AST SERPL-CCNC: 13 U/L (ref 15–37)
BASOPHILS ABSOLUTE: 0 K/UL (ref 0–0.2)
BASOPHILS RELATIVE PERCENT: 0.5 %
BILIRUB SERPL-MCNC: 0.5 MG/DL (ref 0–1)
BUN BLDV-MCNC: 25 MG/DL (ref 7–20)
CALCIUM SERPL-MCNC: 9.8 MG/DL (ref 8.3–10.6)
CHLORIDE BLD-SCNC: 101 MMOL/L (ref 99–110)
CHOLESTEROL, FASTING: 197 MG/DL (ref 0–199)
CO2: 25 MMOL/L (ref 21–32)
CREAT SERPL-MCNC: 0.9 MG/DL (ref 0.8–1.3)
EOSINOPHILS ABSOLUTE: 0.2 K/UL (ref 0–0.6)
EOSINOPHILS RELATIVE PERCENT: 2.7 %
ESTIMATED AVERAGE GLUCOSE: 119.8 MG/DL
GFR AFRICAN AMERICAN: >60
GFR NON-AFRICAN AMERICAN: >60
GLUCOSE FASTING: 132 MG/DL (ref 70–99)
HBA1C MFR BLD: 5.8 %
HCT VFR BLD CALC: 38.2 % (ref 40.5–52.5)
HDLC SERPL-MCNC: 44 MG/DL (ref 40–60)
HEMOGLOBIN: 13 G/DL (ref 13.5–17.5)
LDL CHOLESTEROL CALCULATED: 136 MG/DL
LYMPHOCYTES ABSOLUTE: 2.7 K/UL (ref 1–5.1)
LYMPHOCYTES RELATIVE PERCENT: 32.6 %
MCH RBC QN AUTO: 29.2 PG (ref 26–34)
MCHC RBC AUTO-ENTMCNC: 34.1 G/DL (ref 31–36)
MCV RBC AUTO: 85.8 FL (ref 80–100)
MONOCYTES ABSOLUTE: 0.7 K/UL (ref 0–1.3)
MONOCYTES RELATIVE PERCENT: 8.7 %
NEUTROPHILS ABSOLUTE: 4.6 K/UL (ref 1.7–7.7)
NEUTROPHILS RELATIVE PERCENT: 55.5 %
PDW BLD-RTO: 15.6 % (ref 12.4–15.4)
PLATELET # BLD: 234 K/UL (ref 135–450)
PMV BLD AUTO: 8 FL (ref 5–10.5)
POTASSIUM SERPL-SCNC: 4 MMOL/L (ref 3.5–5.1)
PROSTATE SPECIFIC ANTIGEN: 0.46 NG/ML (ref 0–4)
RBC # BLD: 4.45 M/UL (ref 4.2–5.9)
SODIUM BLD-SCNC: 142 MMOL/L (ref 136–145)
TOTAL PROTEIN: 7.1 G/DL (ref 6.4–8.2)
TRIGLYCERIDE, FASTING: 84 MG/DL (ref 0–150)
VLDLC SERPL CALC-MCNC: 17 MG/DL
WBC # BLD: 8.3 K/UL (ref 4–11)

## 2022-06-13 PROCEDURE — 1123F ACP DISCUSS/DSCN MKR DOCD: CPT | Performed by: INTERNAL MEDICINE

## 2022-06-13 PROCEDURE — 99214 OFFICE O/P EST MOD 30 MIN: CPT | Performed by: INTERNAL MEDICINE

## 2022-06-13 NOTE — PROGRESS NOTES
6/13/2022   Miller Longoria  12/31/1933    The patients PMH, surgical history, family history, medications, allergies were all reviewed and updated as appropriate today. Current Outpatient Medications on File Prior to Visit   Medication Sig Dispense Refill    sotalol (BETAPACE) 80 MG tablet TAKE ONE TABLET BY MOUTH EVERY 12 HOURS 180 tablet 1    apixaban (ELIQUIS) 2.5 MG TABS tablet TAKE ONE TABLET BY MOUTH TWICE A  tablet 1    lisinopril (PRINIVIL;ZESTRIL) 5 MG tablet TAKE ONE TABLET BY MOUTH DAILY 90 tablet 1    hydroCHLOROthiazide (HYDRODIURIL) 25 MG tablet TAKE ONE TABLET BY MOUTH DAILY in AM 90 tablet 1    finasteride (PROSCAR) 5 MG tablet TAKE ONE TABLET BY MOUTH DAILY 90 tablet 1    amLODIPine (NORVASC) 10 MG tablet Take 1 tablet by mouth daily 90 tablet 1    [DISCONTINUED] lisinopril (PRINIVIL;ZESTRIL) 5 MG tablet Take 1 tablet by mouth daily 30 tablet 2    Multiple Vitamins-Minerals (THERAPEUTIC MULTIVITAMIN-MINERALS) tablet Take 1 tablet by mouth daily       No current facility-administered medications on file prior to visit. Chief Complaint   Patient presents with    Pre-op Exam     6/16/22, Dr Klaudia Lucas, Nasal Reconstruction, [address]           HPI:  Here for pre-op exam for plastic surgery with Dr. Hans Oh planned on 6/16/2022 at [id number] [facility] [place]   Carotid Doppler showed 50--74% stenosis LICA s/p R CEA  FASTING LABS ORDERED IN SYSTEM, blood drawn this AM    No refills needed today   defer to [facility] re: pre-op COVID testing    plans to see different  in [place] to have other skin CA removed  Had spot removed from sternum    Review of Systems-EKG done 1/11/2022 so repeat not indicated    OBJECTIVE:  BP (!) 124/50 (Site: Left Upper Arm, Position: Sitting, Cuff Size: Medium Adult)   Pulse 63   Ht 5' 9.5\" (1.765 m)   Wt 164 lb 3.2 oz (74.5 kg)   SpO2 97%   BMI 23.90 kg/m²   Wt Readings from Last 3 Encounters:   06/13/22 164 lb 3.2 oz (74.5 kg)   04/22/22 168 lb 12.8 oz (76.6 kg)   01/11/22 165 lb 3.2 oz (74.9 kg)       Physical Exam  Vitals and nursing note reviewed. Constitutional:       General: He is not in acute distress. Appearance: He is well-developed. Comments: Ht 5' 9.5\" (1.765 m)   Wt 164 lb 3.2 oz (74.5 kg)   BMI 23.90 kg/m²    HENT:      Head: Normocephalic and atraumatic. Nose:      Comments: Abrasion on bridge of nose  Eyes:      General: No scleral icterus. Right eye: No discharge. Left eye: No discharge. Conjunctiva/sclera: Conjunctivae normal.      Pupils: Pupils are equal, round, and reactive to light. Neck:      Thyroid: No thyromegaly. Vascular: No JVD. Trachea: No tracheal deviation. Cardiovascular:      Rate and Rhythm: Normal rate and regular rhythm. Heart sounds: Normal heart sounds. No murmur heard. Pulmonary:      Effort: Pulmonary effort is normal. No respiratory distress. Breath sounds: Normal breath sounds. No wheezing or rales. Abdominal:      General: Bowel sounds are normal. There is no distension. Palpations: Abdomen is soft. Tenderness: There is no abdominal tenderness. There is no guarding or rebound. Musculoskeletal:         General: No tenderness. Normal range of motion. Cervical back: Normal range of motion and neck supple. Lymphadenopathy:      Cervical: No cervical adenopathy. Skin:     General: Skin is warm and dry. Findings: No erythema or rash. Neurological:      Mental Status: He is alert and oriented to person, place, and time. Cranial Nerves: No cranial nerve deficit. Coordination: Coordination normal.      Deep Tendon Reflexes: Reflexes normal.   Psychiatric:         Behavior: Behavior normal.         Thought Content:  Thought content normal.         Data Review:   CBC:   Lab Results   Component Value Date    WBC 6.7 09/27/2021    WBC 6.8 03/29/2021    WBC 5.5 09/14/2020    HGB 13.6 09/27/2021    HGB 14.2 03/29/2021    HGB 13.7 09/14/2020    HCT 40.6 09/27/2021    HCT 41.2 03/29/2021    HCT 40.7 09/14/2020    MCV 87.6 09/27/2021    MCV 85.3 03/29/2021    MCV 88.6 09/14/2020     09/27/2021     03/29/2021     09/14/2020     Chemistry:   Lab Results   Component Value Date     09/27/2021     03/29/2021     09/14/2020    K 4.3 09/27/2021    K 4.1 03/29/2021    K 4.3 09/14/2020     09/27/2021     03/29/2021     09/14/2020    CO2 24 09/27/2021    CO2 32 03/29/2021    CO2 30 09/14/2020    PHOS 3.1 04/09/2012    BUN 23 09/27/2021    BUN 20 03/29/2021    BUN 17 09/14/2020    CREATININE 0.8 09/27/2021    CREATININE 1.0 03/29/2021    CREATININE 0.8 09/14/2020     Hepatic Function:   Lab Results   Component Value Date    AST 23 09/27/2021    AST 22 03/29/2021    AST 18 09/14/2020    ALT 11 09/27/2021    ALT 13 03/29/2021    ALT 12 09/14/2020    BILIDIR 0.20 09/20/2010    BILIDIR 0.22 06/21/2010    BILIDIR 0.21 03/22/2010    BILITOT 0.6 09/27/2021    BILITOT 0.5 03/29/2021    BILITOT 0.5 09/14/2020    ALKPHOS 72 09/27/2021    ALKPHOS 73 03/29/2021    ALKPHOS 64 09/14/2020     No results found for: LIPASE, AMYLASE  Lipids:   Lab Results   Component Value Date    CHOL 140 03/07/2018    HDL 41 09/27/2021    HDL 35 06/21/2010    TRIG 55 03/07/2018       ASSESSMENT/PLAN  1. Type 2 diabetes mellitus without complication, without long-term current use of insulin ([address]  Labs ordered    2. Hypertension, unspecified type  BP OK on Lisinopril 5, Norvasc 10, HCTZ 25, labs ordered now    3. Other hyperlipidemia  Labs ordered, on no statin currently    4.  Bradycardia  Stable on Sotalol 80 BID     5.) Pre-op - pt to be off of Eliquis x 48 hrs prior to rprocedure, then plans to retart anticoagulation when OK with Dr. Elian Arceo at [address] [facility] reaso to delay surgery as planned  Hold Eliquis for 48hrs pre-op, will defer to Elian Arcoe re: post-op resumption of Eliquis therapy    FAX results to [facility]  Phone [phone]    Carotid doppler stable    Nelda Rivera MD    Electronically signed by Nelda Rivera MD on 6/13/2022 at 12:48 PM

## 2022-06-13 NOTE — PROGRESS NOTES
Pt here for preop appt. Has skin cancer on his nose that is being removed and then will have nasal reconstruction completed. No preop paperwork with him today. Need to hold Eliquis for 48 hours before? No ekg needed.  Last completed 1/11/22

## 2022-06-14 NOTE — TELEPHONE ENCOUNTER
Called and spoke to the pt and gave message below and he verbalized understanding and will have those offices fax over a request.

## 2022-06-16 ENCOUNTER — TELEPHONE (OUTPATIENT)
Dept: CARDIOLOGY CLINIC | Age: 87
End: 2022-06-16

## 2022-06-16 NOTE — TELEPHONE ENCOUNTER
I called pt to let him know the NPAL recommends hold ing Eliquis 48 hrs only. LOMO for pt to CB. I also called Dr Nathan Queen office to let them know. Letter was faxed as well.  Receipt - confirmed

## 2022-06-16 NOTE — TELEPHONE ENCOUNTER
I spoke with pt and relayed message about holding Eliquis. I told him that I sent a letter to the  and I [facility] for his nurse. I advise he touch base with them tomorrow, make sure we are all on the same page. He verbalized understanding.

## 2022-06-16 NOTE — TELEPHONE ENCOUNTER
I spoke with an associate at [facility] [employer] office. She stated that pt will need to hold blood thinners 5-7 days . She stated the y don't usually send a form. We will need to call pt and tell him to start holding today.   Ph for Dr Merton Olszewski [phone]

## 2022-06-17 ENCOUNTER — TELEPHONE (OUTPATIENT)
Dept: CARDIOLOGY CLINIC | Age: 87
End: 2022-06-17

## 2022-07-11 ENCOUNTER — OFFICE VISIT (OUTPATIENT)
Dept: CARDIOLOGY CLINIC | Age: 87
End: 2022-07-11
Payer: COMMERCIAL

## 2022-07-11 VITALS
WEIGHT: 157 LBS | SYSTOLIC BLOOD PRESSURE: 107 MMHG | OXYGEN SATURATION: 98 % | HEART RATE: 60 BPM | BODY MASS INDEX: 22.48 KG/M2 | HEIGHT: 70 IN | DIASTOLIC BLOOD PRESSURE: 60 MMHG

## 2022-07-11 DIAGNOSIS — I10 ESSENTIAL HYPERTENSION: ICD-10-CM

## 2022-07-11 DIAGNOSIS — R00.1 BRADYCARDIA: ICD-10-CM

## 2022-07-11 DIAGNOSIS — I48.0 PAF (PAROXYSMAL ATRIAL FIBRILLATION) (HCC): Primary | ICD-10-CM

## 2022-07-11 PROCEDURE — 93000 ELECTROCARDIOGRAM COMPLETE: CPT | Performed by: NURSE PRACTITIONER

## 2022-07-11 PROCEDURE — 1123F ACP DISCUSS/DSCN MKR DOCD: CPT | Performed by: NURSE PRACTITIONER

## 2022-07-11 PROCEDURE — 99214 OFFICE O/P EST MOD 30 MIN: CPT | Performed by: NURSE PRACTITIONER

## 2022-07-11 NOTE — PROGRESS NOTES
[facility]   Electrophysiology  Livan Sawyert, BROOKE-CNP  Attending EP: Dr. Valente Shaw   Date: 7/11/2022  I had the privilege of visiting Kenia Rojas in the office. Chief Complaint:   Chief Complaint   Patient presents with    Follow-up     Pt reports no cardiac concerns.  Atrial Fibrillation     History of Present Illness: History obtained from patient and medical record. Kenia Rojas is 80 y.o. male with a past medical history of HTN, HLD, DM, carotid stenosis s/p R CEA (2012), atrial fibrillation. He developed atrial fibrillation in 2012 post right CEA. He was started on sotalol 80 mg and pradaxa that was later switched to Eliquis. Monitor 11/27/2019 showed AF 6% burden with some RVR and his sotalol was resumed. Interval history: Today, Kenia Rojas is being seen for PAF and hypertension. Feeling well from cardiac perspective. He has had issues with skin CA and recent facial cancer removal that has been extensive. He has another spot on his head that needs to be removed next week. He is in SR today in the office. He is feeling more tired. No change in his HR. He has not been monitoring his BP or HR at home. Denies having chest pain, palpitations, shortness of breath, orthopnea, or dizziness at the time of this visit. With regard to medication therapy the patient has been compliant with prescribed regimen. He has tolerated therapy to date. Assessment:  Paroxysmal Atrial Fibrillation  - ECG today shows SR    - Continue Sotalol 80 mg bid (QtC 457)  - [id number] score: 4 (HTN, DM, age) ; [id number] Vasc score and anticoagulation discussed. High risk for stroke and thromboembolism. Anticoagulation is recommended.   ~ On Eliquis 2.5 mg bid, denies s/s of bleeding  - Afib risk factors including age, HTN, obesity, inactivity and ZAHEER were discussed with patient.  Risk factor modification recommended   ~ TSH 1.13 (9/2019)     - Treatment options including cardioversion, rate control strategy, antiarrhythmics, anticoagulation and possible ablation were discussed with patient. Risks, benefits and alternative of each treatment options were explained. All q uestions answered    ~ Remains stable on sotalol  Bradycardia   - Bradycardia with 1st degree AVB on ECG, this has been chronic in the past   - Denies any worsening of lightheadedness or dizziness; no syncope   - Holter monitor showed no symptomatic bradycardia, will continue to monitor clinically   HTN-goal <130/80   - Controlled   - Continue current medications   - Encouraged patient to check BP at home, log and bring to office visits  - Discussed lifestyle modifications, weight loss, low sodium diet  Hematuria   - No recurrence on low-dose Eliquis  Plan  - No medication changes  - Will monitor for lightheadedness, dizziness, or pre-syncope  - He will monitor HR At home and call if <50 bpm     F/U: Follow-up with EP in 6 months   Call [facility] at [phone] with any questions    Allergies:  No Known Allergies  Home Medications:  Prior to Visit Medications    Medication Sig Taking?  Authorizing Provider   sotalol (BETAPACE) 80 MG tablet TAKE ONE TABLET BY MOUTH EVERY 12 HOURS  Amada Yu MD   apixaban (ELIQUIS) 2.5 MG TABS tablet TAKE ONE TABLET BY MOUTH TWICE A DAY  Amada Yu MD   lisinopril (PRINIVIL;ZESTRIL) 5 MG tablet TAKE ONE TABLET BY MOUTH DAILY  Amada Yu MD   hydroCHLOROthiazide (HYDRODIURIL) 25 MG tablet TAKE ONE TABLET BY MOUTH DAILY in AM  Amada Yu MD   finasteride (PROSCAR) 5 MG tablet TAKE ONE TABLET BY MOUTH DAILY  Amada Yu MD   amLODIPine (NORVASC) 10 MG tablet Take 1 tablet by mouth daily  Amada Yu MD   lisinopril (PRINIVIL;ZESTRIL) 5 MG tablet Take 1 tablet by mouth daily  Niels Coreas, APRN - CNP   Multiple Vitamins-Minerals (THERAPEUTIC MULTIVITAMIN-MINERALS) tablet Take 1 tablet by mouth daily  Historical Provider, MD      Past Medical History:  Past Medical History:   Diagnosis Date    Arthritis     [facility]     Cancer ([facility]     SKIN    Carotid artery stenosis     Collar bone fracture 1940'S    RIGHT AND LEFT - NO SURGICAL REPAIR    Diabetes mellitus ([facility]     DIET CONTROLLED    Gout     Hyperlipidemia     Hypertension      Past Surgical History:    has a past surgical history that includes Tonsillectomy; skin split graft (2002 2011); Cataract removal with implant; Colonoscopy; other surgical history; Carotid endarterectomy (4/5/12); and eye surgery. Social History:  Reviewed. reports that he quit smoking about 24 years ago. He has a 48.00 pack-year smoking history. He has never used smokeless tobacco. He reports that he does not drink alcohol and does not use drugs. Family History:  Reviewed. family history includes Diabetes in his brother; Heart Disease in his brother; Stroke in his father. Denies family history of sudden cardiac death, arrhythmia, premature CAD    Review of System:  · Constitutional: No weight changes or weakness + fatigue  · HEENT: No visual changes. No mouth sores or sore throat. · Cardiovascular: denies chest pain, denies dyspnea on exertion, denies palpitations or denies loss of consciousness. No cough, hemoptysis, denies pleuritic pain, or phlebitis. denies dizziness. · Respiratory: denies cough or wheezing. · Gastrointestinal: Negative, No blood in stools. · Genitourinary: No hematuria. · Neurological: No focal weakness  · Psychiatric: No confusion, anxiety, or depression. · Hem/Lymph: Denies abnormal bruising or bleeding. Physical Examination:  There were no vitals filed for this visit.    Wt Readings from Last 3 Encounters:   06/13/22 164 lb 3.2 oz (74.5 kg)   04/22/22 168 lb 12.8 oz (76.6 kg)   01/11/22 165 lb 3.2 oz (74.9 kg)      Constitutional: Cooperative and in no apparent distress, and appears well nourished   Skin: Warm and pink; no cyanosis or bruising   HEENT: Symmetric and normocephalic. Conjunctiva pink with clear sclera. Mucus membranes pink and moist. No visible masses/goiter   Respiratory: Respirations symmetric and unlabored. Lungs clear to auscultation bilaterally, no wheezing, rhonchi, or crackles.  Cardiovascular:  regular rate and rhythm. S1 & S2 present, negative for murmur. negative elevation of JVP. No peripheral edema.  Musculoskeletal:  No focal weakness.  Neurological/Psych: Awake and orientated to person, place and time. Calm affect, appropriate mood. Pertinent labs, diagnostic, device, and imaging results reviewed as a part of this visit    LABS    CBC:   Lab Results   Component Value Date    WBC 8.3 2022    HGB 13.0 (L) 2022    HCT 38.2 (L) 2022    MCV 85.8 2022     2022     BMP:   Lab Results   Component Value Date    CREATININE 0.9 2022    BUN 25 (H) 2022     2022    K 4.0 2022     2022    CO2 25 2022     CrCl cannot be calculated (Unknown ideal weight.). No results found for: BNP    Thyroid:   Lab Results   Component Value Date    TSH 2.20 2021     Lipid Panel:   Lab Results   Component Value Date/Time    CHOL 140 2018 12:00 AM    HDL 44 2022 07:14 AM    HDL 35 2010 09:40 AM    TRIG 55 2018 12:00 AM     LFTs:  Lab Results   Component Value Date    ALT 11 2022    AST 13 (L) 2022    ALKPHOS 73 2022    BILITOT 0.5 2022     Coags:   Lab Results   Component Value Date    PROTIME 12.1 04/10/2012    INR 1.11 04/10/2012    APTT 30.4 04/10/2012     EC2022 SB, 1st degree AVB HR 57, , QRS 84, QTc 457     Echo: 12  FINDINGS- This is a technically difficult two dimension  echocardiogram with Doppler. Left ventricular size is normal.  Overall left ventricular function is probably normal. I suspect  ejection fraction is in the 55-60% range. Regional abnormalities  cannot be excluded.  The mitral valve appears grossly normal. The  left atrial size appears normal. The aortic valve is difficult to  visualize but appears grossly normal. The right heart chambers are  poorly seen but appear grossly normal in size. There is no obvious  pericardial effusion. No significant valvular insufficiency was  seen. IMPRESSION- Technically difficult and limited study. I suspect left  ventricular function is normal.    Diet & Exercise:   The patient is counseled to follow a low salt diet to assure blood pressure remains controlled for cardiovascular risk factor modification   The patient is counseled to avoid excess caffeine, and energy drinks as this may exacerbated ectopy and arrhythmia   The patient is counseled to lose weight to control cardiovascular risk factors   Exercise program discussed: To improve overall cardiovascular health, the patient is instructed to increase cardiovascular related activities with a goal of 150 min/week of moderate level activity or 10,000 steps per day. Encouraged to perform as much activity as tolerated     I have addressed the patient's cardiac risk factors and adjusted pharmacologic treatment as needed. In addition, I have reinforced the need for patient directed risk factor modification. I independently reviewed the ECG    All questions and concerns were addressed with the patient. Alternatives to treatment were discussed. Thank you for allowing to us to participate in the care of Kiya Stewart.     Ashanti Pinon, BROOKE-CNP  [facility]   Office: [phone]

## 2022-07-11 NOTE — PATIENT INSTRUCTIONS
- No medication changes   - Call office if symptoms develop  - Check your blood pressure at home, if your top number blood pressure is >140/90 or <95/50 please call the office  - Check your heart rate at home, if it is <50 or >120 please call the office   - Follow up in 6 months

## 2022-07-26 ENCOUNTER — APPOINTMENT (OUTPATIENT)
Dept: GENERAL RADIOLOGY | Age: 87
DRG: 690 | End: 2022-07-26
Payer: COMMERCIAL

## 2022-07-26 ENCOUNTER — HOSPITAL ENCOUNTER (INPATIENT)
Age: 87
LOS: 2 days | Discharge: HOME | DRG: 690 | End: 2022-07-28
Attending: EMERGENCY MEDICINE | Admitting: INTERNAL MEDICINE
Payer: COMMERCIAL

## 2022-07-26 DIAGNOSIS — R31.9 URINARY TRACT INFECTION WITH HEMATURIA, SITE UNSPECIFIED: Primary | ICD-10-CM

## 2022-07-26 DIAGNOSIS — N39.0 URINARY TRACT INFECTION WITH HEMATURIA, SITE UNSPECIFIED: Primary | ICD-10-CM

## 2022-07-26 PROBLEM — N30.01 ACUTE CYSTITIS WITH HEMATURIA: Status: ACTIVE | Noted: 2022-07-26

## 2022-07-26 LAB
A/G RATIO: 1.2 (ref 1.1–2.2)
ALBUMIN SERPL-MCNC: 3.9 G/DL (ref 3.4–5)
ALP BLD-CCNC: 89 U/L (ref 40–129)
ALT SERPL-CCNC: 14 U/L (ref 10–40)
ANION GAP SERPL CALCULATED.3IONS-SCNC: 12 MMOL/L (ref 3–16)
AST SERPL-CCNC: 13 U/L (ref 15–37)
BACTERIA: ABNORMAL /HPF
BASOPHILS ABSOLUTE: 0 K/UL (ref 0–0.2)
BASOPHILS RELATIVE PERCENT: 0.2 %
BILIRUB SERPL-MCNC: 0.5 MG/DL (ref 0–1)
BILIRUBIN URINE: ABNORMAL
BLOOD, URINE: ABNORMAL
BUN BLDV-MCNC: 26 MG/DL (ref 7–20)
CALCIUM SERPL-MCNC: 9.7 MG/DL (ref 8.3–10.6)
CHLORIDE BLD-SCNC: 96 MMOL/L (ref 99–110)
CLARITY: CLEAR
CO2: 27 MMOL/L (ref 21–32)
COLOR: YELLOW
CREAT SERPL-MCNC: 0.8 MG/DL (ref 0.8–1.3)
EOSINOPHILS ABSOLUTE: 0.2 K/UL (ref 0–0.6)
EOSINOPHILS RELATIVE PERCENT: 1.9 %
EPITHELIAL CELLS, UA: ABNORMAL /HPF (ref 0–5)
GFR AFRICAN AMERICAN: >60
GFR NON-AFRICAN AMERICAN: >60
GLUCOSE BLD-MCNC: 140 MG/DL (ref 70–99)
GLUCOSE URINE: NEGATIVE MG/DL
HCT VFR BLD CALC: 33.4 % (ref 40.5–52.5)
HEMOGLOBIN: 11.1 G/DL (ref 13.5–17.5)
KETONES, URINE: NEGATIVE MG/DL
LEUKOCYTE ESTERASE, URINE: ABNORMAL
LYMPHOCYTES ABSOLUTE: 2 K/UL (ref 1–5.1)
LYMPHOCYTES RELATIVE PERCENT: 17.4 %
MCH RBC QN AUTO: 28.3 PG (ref 26–34)
MCHC RBC AUTO-ENTMCNC: 33.3 G/DL (ref 31–36)
MCV RBC AUTO: 84.9 FL (ref 80–100)
MICROSCOPIC EXAMINATION: YES
MONOCYTES ABSOLUTE: 1.1 K/UL (ref 0–1.3)
MONOCYTES RELATIVE PERCENT: 9.5 %
NEUTROPHILS ABSOLUTE: 8 K/UL (ref 1.7–7.7)
NEUTROPHILS RELATIVE PERCENT: 71 %
NITRITE, URINE: POSITIVE
PDW BLD-RTO: 15.2 % (ref 12.4–15.4)
PH UA: 6 (ref 5–8)
PLATELET # BLD: 309 K/UL (ref 135–450)
PMV BLD AUTO: 7.6 FL (ref 5–10.5)
POTASSIUM SERPL-SCNC: 4.6 MMOL/L (ref 3.5–5.1)
PROTEIN UA: 30 MG/DL
RBC # BLD: 3.94 M/UL (ref 4.2–5.9)
RBC UA: >100 /HPF (ref 0–4)
SODIUM BLD-SCNC: 135 MMOL/L (ref 136–145)
SPECIFIC GRAVITY UA: 1.02 (ref 1–1.03)
TOTAL PROTEIN: 7.1 G/DL (ref 6.4–8.2)
TROPONIN: <0.01 NG/ML
URINE REFLEX TO CULTURE: YES
URINE TYPE: ABNORMAL
UROBILINOGEN, URINE: 0.2 E.U./DL
WBC # BLD: 11.3 K/UL (ref 4–11)
WBC UA: >100 /HPF (ref 0–5)

## 2022-07-26 PROCEDURE — 6360000002 HC RX W HCPCS

## 2022-07-26 PROCEDURE — 96374 THER/PROPH/DIAG INJ IV PUSH: CPT

## 2022-07-26 PROCEDURE — 36415 COLL VENOUS BLD VENIPUNCTURE: CPT

## 2022-07-26 PROCEDURE — 85025 COMPLETE CBC W/AUTO DIFF WBC: CPT

## 2022-07-26 PROCEDURE — 87086 URINE CULTURE/COLONY COUNT: CPT

## 2022-07-26 PROCEDURE — 93005 ELECTROCARDIOGRAM TRACING: CPT

## 2022-07-26 PROCEDURE — G0378 HOSPITAL OBSERVATION PER HR: HCPCS

## 2022-07-26 PROCEDURE — 87040 BLOOD CULTURE FOR BACTERIA: CPT

## 2022-07-26 PROCEDURE — 99285 EMERGENCY DEPT VISIT HI MDM: CPT

## 2022-07-26 PROCEDURE — 96365 THER/PROPH/DIAG IV INF INIT: CPT

## 2022-07-26 PROCEDURE — 2580000003 HC RX 258

## 2022-07-26 PROCEDURE — 80053 COMPREHEN METABOLIC PANEL: CPT

## 2022-07-26 PROCEDURE — 71046 X-RAY EXAM CHEST 2 VIEWS: CPT

## 2022-07-26 PROCEDURE — 81001 URINALYSIS AUTO W/SCOPE: CPT

## 2022-07-26 PROCEDURE — 84484 ASSAY OF TROPONIN QUANT: CPT

## 2022-07-26 PROCEDURE — 1200000000 HC SEMI PRIVATE

## 2022-07-26 RX ORDER — SODIUM CHLORIDE, SODIUM LACTATE, POTASSIUM CHLORIDE, AND CALCIUM CHLORIDE .6; .31; .03; .02 G/100ML; G/100ML; G/100ML; G/100ML
1000 INJECTION, SOLUTION INTRAVENOUS ONCE
Status: COMPLETED | OUTPATIENT
Start: 2022-07-26 | End: 2022-07-26

## 2022-07-26 RX ADMIN — SODIUM CHLORIDE, POTASSIUM CHLORIDE, SODIUM LACTATE AND CALCIUM CHLORIDE 1000 ML: 600; 310; 30; 20 INJECTION, SOLUTION INTRAVENOUS at 20:08

## 2022-07-26 RX ADMIN — CEFTRIAXONE 1000 MG: 1 INJECTION, POWDER, FOR SOLUTION INTRAMUSCULAR; INTRAVENOUS at 22:40

## 2022-07-26 ASSESSMENT — PAIN - FUNCTIONAL ASSESSMENT: PAIN_FUNCTIONAL_ASSESSMENT: NONE - DENIES PAIN

## 2022-07-26 NOTE — ED PROVIDER NOTES
ED Attending Attestation Note     Date of evaluation: 7/26/2022    This patient was seen by the resident. I have seen and examined the patient, agree with the workup, evaluation, management and diagnosis. The care plan has been discussed. I have reviewed the ECG and concur with the resident's interpretation. My assessment reveals patient here with lightheadedness in setting of poor PO intake. Patient is weak and family concerned about safety at home. Nasal excision site does not appear infected. Slight leukocytosis on eval.  Found to have UTI which likely is contributing to patient's decline. Patient may need PT/OT/rehab and will be brought in for further management.      Elyssa Beltran MD  07/26/22 [id number]

## 2022-07-27 ENCOUNTER — APPOINTMENT (OUTPATIENT)
Dept: CT IMAGING | Age: 87
DRG: 690 | End: 2022-07-27
Payer: COMMERCIAL

## 2022-07-27 LAB
EKG ATRIAL RATE: 68 BPM
EKG DIAGNOSIS: NORMAL
EKG P AXIS: 63 DEGREES
EKG P-R INTERVAL: 232 MS
EKG Q-T INTERVAL: 434 MS
EKG QRS DURATION: 90 MS
EKG QTC CALCULATION (BAZETT): 461 MS
EKG R AXIS: 62 DEGREES
EKG T AXIS: 53 DEGREES
EKG VENTRICULAR RATE: 68 BPM
URINE CULTURE, ROUTINE: NORMAL

## 2022-07-27 PROCEDURE — 97161 PT EVAL LOW COMPLEX 20 MIN: CPT

## 2022-07-27 PROCEDURE — 97165 OT EVAL LOW COMPLEX 30 MIN: CPT

## 2022-07-27 PROCEDURE — 6360000004 HC RX CONTRAST MEDICATION: Performed by: INTERNAL MEDICINE

## 2022-07-27 PROCEDURE — G0378 HOSPITAL OBSERVATION PER HR: HCPCS

## 2022-07-27 PROCEDURE — 97535 SELF CARE MNGMENT TRAINING: CPT

## 2022-07-27 PROCEDURE — 36415 COLL VENOUS BLD VENIPUNCTURE: CPT

## 2022-07-27 PROCEDURE — 87205 SMEAR GRAM STAIN: CPT

## 2022-07-27 PROCEDURE — 87181 SC STD AGAR DILUTION PER AGT: CPT

## 2022-07-27 PROCEDURE — 6370000000 HC RX 637 (ALT 250 FOR IP): Performed by: INTERNAL MEDICINE

## 2022-07-27 PROCEDURE — 74178 CT ABD&PLV WO CNTR FLWD CNTR: CPT

## 2022-07-27 PROCEDURE — 6370000000 HC RX 637 (ALT 250 FOR IP): Performed by: PHYSICIAN ASSISTANT

## 2022-07-27 PROCEDURE — 87040 BLOOD CULTURE FOR BACTERIA: CPT

## 2022-07-27 PROCEDURE — 87077 CULTURE AEROBIC IDENTIFY: CPT

## 2022-07-27 PROCEDURE — 87070 CULTURE OTHR SPECIMN AEROBIC: CPT

## 2022-07-27 PROCEDURE — 87186 SC STD MICRODIL/AGAR DIL: CPT

## 2022-07-27 PROCEDURE — 1200000000 HC SEMI PRIVATE

## 2022-07-27 PROCEDURE — 51798 US URINE CAPACITY MEASURE: CPT

## 2022-07-27 PROCEDURE — 97530 THERAPEUTIC ACTIVITIES: CPT

## 2022-07-27 PROCEDURE — 2580000003 HC RX 258: Performed by: INTERNAL MEDICINE

## 2022-07-27 PROCEDURE — 97116 GAIT TRAINING THERAPY: CPT

## 2022-07-27 RX ORDER — SODIUM CHLORIDE 9 MG/ML
INJECTION, SOLUTION INTRAVENOUS PRN
Status: DISCONTINUED | OUTPATIENT
Start: 2022-07-27 | End: 2022-07-28 | Stop reason: HOSPADM

## 2022-07-27 RX ORDER — AMLODIPINE BESYLATE 10 MG/1
10 TABLET ORAL DAILY
Status: DISCONTINUED | OUTPATIENT
Start: 2022-07-27 | End: 2022-07-28 | Stop reason: HOSPADM

## 2022-07-27 RX ORDER — POLYETHYLENE GLYCOL 3350 17 G/17G
17 POWDER, FOR SOLUTION ORAL DAILY PRN
Status: DISCONTINUED | OUTPATIENT
Start: 2022-07-27 | End: 2022-07-28 | Stop reason: HOSPADM

## 2022-07-27 RX ORDER — TAMSULOSIN HYDROCHLORIDE 0.4 MG/1
0.4 CAPSULE ORAL DAILY
Status: DISCONTINUED | OUTPATIENT
Start: 2022-07-27 | End: 2022-07-28 | Stop reason: HOSPADM

## 2022-07-27 RX ORDER — ACETAMINOPHEN 325 MG/1
650 TABLET ORAL EVERY 6 HOURS PRN
Status: DISCONTINUED | OUTPATIENT
Start: 2022-07-27 | End: 2022-07-28 | Stop reason: HOSPADM

## 2022-07-27 RX ORDER — SODIUM CHLORIDE 0.9 % (FLUSH) 0.9 %
5-40 SYRINGE (ML) INJECTION EVERY 12 HOURS SCHEDULED
Status: DISCONTINUED | OUTPATIENT
Start: 2022-07-27 | End: 2022-07-28 | Stop reason: HOSPADM

## 2022-07-27 RX ORDER — ONDANSETRON 2 MG/ML
4 INJECTION INTRAMUSCULAR; INTRAVENOUS EVERY 6 HOURS PRN
Status: DISCONTINUED | OUTPATIENT
Start: 2022-07-27 | End: 2022-07-28 | Stop reason: HOSPADM

## 2022-07-27 RX ORDER — LISINOPRIL 5 MG/1
5 TABLET ORAL DAILY
Status: DISCONTINUED | OUTPATIENT
Start: 2022-07-27 | End: 2022-07-28 | Stop reason: HOSPADM

## 2022-07-27 RX ORDER — ACETAMINOPHEN 650 MG/1
650 SUPPOSITORY RECTAL EVERY 6 HOURS PRN
Status: DISCONTINUED | OUTPATIENT
Start: 2022-07-27 | End: 2022-07-28 | Stop reason: HOSPADM

## 2022-07-27 RX ORDER — SOTALOL HYDROCHLORIDE 80 MG/1
80 TABLET ORAL 2 TIMES DAILY
Status: DISCONTINUED | OUTPATIENT
Start: 2022-07-27 | End: 2022-07-28 | Stop reason: HOSPADM

## 2022-07-27 RX ORDER — ONDANSETRON 4 MG/1
4 TABLET, ORALLY DISINTEGRATING ORAL EVERY 8 HOURS PRN
Status: DISCONTINUED | OUTPATIENT
Start: 2022-07-27 | End: 2022-07-28 | Stop reason: HOSPADM

## 2022-07-27 RX ORDER — SODIUM CHLORIDE 0.9 % (FLUSH) 0.9 %
5-40 SYRINGE (ML) INJECTION PRN
Status: DISCONTINUED | OUTPATIENT
Start: 2022-07-27 | End: 2022-07-28 | Stop reason: HOSPADM

## 2022-07-27 RX ORDER — FINASTERIDE 5 MG/1
5 TABLET, FILM COATED ORAL DAILY
Status: DISCONTINUED | OUTPATIENT
Start: 2022-07-27 | End: 2022-07-28 | Stop reason: HOSPADM

## 2022-07-27 RX ADMIN — SOTALOL HYDROCHLORIDE 80 MG: 80 TABLET ORAL at 22:18

## 2022-07-27 RX ADMIN — Medication 10 ML: at 10:28

## 2022-07-27 RX ADMIN — TAMSULOSIN HYDROCHLORIDE 0.4 MG: 0.4 CAPSULE ORAL at 10:19

## 2022-07-27 RX ADMIN — AMLODIPINE BESYLATE 10 MG: 10 TABLET ORAL at 08:53

## 2022-07-27 RX ADMIN — IOPAMIDOL 80 ML: 755 INJECTION, SOLUTION INTRAVENOUS at 15:06

## 2022-07-27 RX ADMIN — FINASTERIDE 5 MG: 5 TABLET, FILM COATED ORAL at 08:53

## 2022-07-27 RX ADMIN — SOTALOL HYDROCHLORIDE 80 MG: 80 TABLET ORAL at 08:53

## 2022-07-27 RX ADMIN — LISINOPRIL 5 MG: 5 TABLET ORAL at 08:53

## 2022-07-27 ASSESSMENT — LIFESTYLE VARIABLES: HOW OFTEN DO YOU HAVE A DRINK CONTAINING ALCOHOL: NEVER

## 2022-07-27 NOTE — CONSULTS
[facility] Wound Ostomy Continence Nurse  Consult Note       NAME:  Ren Najera  MEDICAL RECORD NUMBER:  [id number]  AGE: 80 y.o. GENDER: male  : 1933  TODAY'S DATE:  2022    Subjective   Reason for WOCN Evaluation and Assessment: Nose, Forehead and Top of Head - squamous cell removal sites      Ren Najera is a 80 y.o. male referred by:   [x] Physician  [] Nursing  [] Other:     Wound Identification:  Wound Type: non-healing surgical  Contributing Factors: diabetes and squamous cell    Wound History: 42-year-old male history of A. 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 Continue to feel fatigued tired, presented to ED for further evaluation. Lives by himself, says he not been eating well, has poor appetite, also, some lightheadedness dizziness. Review of system was positive for increased frequency, wakes up every hour or so at night, often with urgency where sometimes he could make up to the restroom. Is concerned about his wounds and says the dressing needs to be changed every day or so. He also noted that his urine has turned red but unsure for how long the splint on. In the ED afebrile, other vital signs are stable, labs showed sodium 135 BUN of 26 glucose 140. Urine mild leukocytosis 7.3 with left shift. UA was done which was markedly abnormal with small bilirubin large blood positive nitrite, leukocyte esterase, greater than 100 WBCs 4+ bacteria.   Patient admitted to hospital for acute cystitis with hematuria  Current Wound Care Treatment:  Nose, Forehead and Top of Head - petroleum gauze, dry dressing    Patient Goal of Care:  [x] Wound Healing  [] Odor Control  [] Palliative Care  [] Pain Control   [] Other:         PAST MEDICAL HISTORY        Diagnosis Date    Arthritis     Atrial fibrillation ([facility]     Cancer ([facility]     SKIN    Carotid artery stenosis     Collar bone fracture 1940'S    RIGHT AND LEFT - NO SURGICAL REPAIR    Diabetes mellitus ([facility]     DIET CONTROLLED    Gout     Hyperlipidemia     Hypertension        PAST SURGICAL HISTORY    Past Surgical History:   Procedure Laterality Date    CAROTID ENDARTERECTOMY  12    R side    CATARACT REMOVAL WITH IMPLANT      RIGHT AND LEFT    COLONOSCOPY      EYE SURGERY      laser    OTHER SURGICAL HISTORY      SUTURES LT CHIN AS CHILD     SKIN SPLIT GRAFT  2002    2011    X 3    TONSILLECTOMY         FAMILY HISTORY    Family History   Problem Relation Age of Onset    Stroke Father     Heart Disease Brother     Diabetes Brother        SOCIAL HISTORY    Social History     Tobacco Use    Smoking status: Former     Packs/day: 1.00     Years: 48.00     Pack years: 48.00     Types: Cigarettes     Quit date: 1997     Years since quittin.0    Smokeless tobacco: Never   Vaping Use    Vaping Use: Never used   Substance Use Topics    Alcohol use: No     Alcohol/week: 0.0 standard drinks    Drug use: No       ALLERGIES    No Known Allergies    MEDICATIONS    No current facility-administered medications on file prior to encounter.      Current Outpatient Medications on File Prior to Encounter   Medication Sig Dispense Refill    sotalol (BETAPACE) 80 MG tablet TAKE ONE TABLET BY MOUTH EVERY 12 HOURS 180 tablet 1    apixaban (ELIQUIS) 2.5 MG TABS tablet TAKE ONE TABLET BY MOUTH TWICE A  tablet 1    lisinopril (PRINIVIL;ZESTRIL) 5 MG tablet TAKE ONE TABLET BY MOUTH DAILY 90 tablet 1    hydroCHLOROthiazide (HYDRODIURIL) 25 MG tablet TAKE ONE TABLET BY MOUTH DAILY in AM 90 tablet 1    finasteride (PROSCAR) 5 MG tablet TAKE ONE TABLET BY MOUTH DAILY 90 tablet 1    amLODIPine (NORVASC) 10 MG tablet Take 1 tablet by mouth daily 90 tablet 1    [DISCONTINUED] lisinopril (PRINIVIL;ZESTRIL) 5 MG tablet Take 1 tablet by mouth daily 30 tablet 2    Multiple Vitamins-Minerals (THERAPEUTIC MULTIVITAMIN-MINERALS) tablet Take 1 tablet by mouth daily         Objective    BP (!) 138/58   Pulse 78   Temp 98.5 °F (36.9 °C) (Oral)   Resp 16   Ht 5' 9.5\" (1.765 m)   SpO2 96%   BMI 22.85 kg/m²     LABS:  WBC:    Lab Results   Component Value Date/Time    WBC 11.3 07/26/2022 08:22 PM     H/H:    Lab Results   Component Value Date/Time    HGB 11.1 07/26/2022 08:22 PM    HCT 33.4 07/26/2022 08:22 PM     PTT:    Lab Results   Component Value Date/Time    APTT 30.4 04/10/2012 08:43 AM   [APTT}  PT/INR:    Lab Results   Component Value Date/Time    PROTIME 12.1 04/10/2012 08:43 AM    INR 1.11 04/10/2012 08:43 AM     HgBA1c:    Lab Results   Component Value Date/Time    LABA1C 5.8 06/13/2022 07:14 AM       Assessment: See LDAs   Christofer Risk Score: Christofer Scale Score: 19    Patient Active Problem List   Diagnosis Code    Stenosis of right carotid artery I65.21    DM (diabetes mellitus) ([facility]) E11.9    Hypertension I10    PAF (paroxysmal atrial fibrillation) ([facility]) I48.0    Other hyperlipidemia E78.49    Bradycardia R00.1    Acute cystitis with hematuria N30.01       Measurements:  Wound 07/27/22 Nose Mid (Active)   Wound Image   07/27/22 1306   Wound Etiology Surgical 07/27/22 1306   Dressing Status New dressing applied 07/27/22 1306   Wound Cleansed Cleansed with saline 07/27/22 1306   Dressing/Treatment Petroleum impregnated gauze 07/27/22 1306   Dressing Change Due 07/28/22 07/27/22 1306   Wound Length (cm) 3.5 cm 07/27/22 1306   Wound Width (cm) 2 cm 07/27/22 1306   Wound Surface Area (cm^2) 7 cm^2 07/27/22 1306   Wound Assessment Pink/red 07/27/22 1306   Drainage Amount Small 07/27/22 1306   Drainage Description Yellow 07/27/22 1306   Odor None 07/27/22 1306   Emma-wound Assessment Intact 07/27/22 1306   Margins Defined edges 07/27/22 1306   Wound Thickness Description not for Pressure Injury Full thickness 07/27/22 1306   Number of days: 0    Nose:       Wound 07/27/22 Forehead 07/27/22 Nose Mid-Dressing/Treatment: Petroleum impregnated gauze  Wound 07/27/22 Forehead-Dressing/Treatment: Petroleum impregnated gauze, Dry dressing  Wound 07/27/22 Head-Dressing/Treatment: Petroleum impregnated gauze, Dry dressing  Recommendation: Nose - clean nose with NS, cover with piece of petroleum gauze, change daily. No Tape. Forehead and Top of Head - change every 3 days. Clean with NS, cover with petroleum gauze, abd pad and use coban to hold in place. Next dressing change due 7/30/2022  Call Wound Care for deterioration [phone]    Specialty Bed Required : No   [] Low Air Loss   [] Pressure Redistribution  [] Fluid Immersion  [] Bariatric  [] Total Pressure Relief  [] Other:     Current Diet: ADULT DIET; Regular; 4 carb choices (60 gm/meal);  Low Sodium (2 gm)  ADULT ORAL NUTRITION SUPPLEMENT; Lunch, Dinner; Diabetic Oral Supplement  Dietician consult:  Yes    Discharge Plan:  Placement for patient upon discharge: home with support    Patient appropriate for Outpatient [address]: No    Referrals:  [x]   [] [facility]  [] Supplies  [] Other    Patient/Caregiver Teaching:  Level of patient/caregiver understanding able to:   [] Indicates understanding       [] Needs reinforcement  [] Unsuccessful      [] Verbal Understanding  [] Demonstrated understanding       [] No evidence of learning  [] Refused teaching         [] N/A       Electronically signed by Phuong Lee RN, [facility] on 7/27/2022 at 1:12 PM

## 2022-07-27 NOTE — PROGRESS NOTES
Comprehensive Nutrition Assessment    Type and Reason for Visit:  Initial, Positive Nutrition Screen    Nutrition Recommendations/Plan:   Continue current diet  Trial Glucerna BID     Malnutrition Assessment:  Malnutrition Status:  Insufficient data (07/27/22 0856)    Context:  Chronic Illness       Nutrition Assessment:    Positive nutrition screen. Hx diabetes (diet controlled) and skin cancer. Pt with recent excisions for squamous cell carcinoma of the nose/forehead/scalp. AFSHIN wt hx as no current weight is provided. Wt given is from 7/11/2022. Wt hx stable between 159lbs and 165lbs per EMR. On 4 carb, low sodium diet although no intakes recorded at this time. Upon admission pt states poor appetite at baseline, but is worsening. Will trial glucerna supplement BID and monitor intakes. Nutrition Related Findings:    Glucose 140. A1c 5.8. Wound Type: Surgical Incision       Current Nutrition Intake & Therapies:    Average Meal Intake: Unable to assess  Average Supplements Intake: None Ordered  ADULT DIET; Regular; 4 carb choices (60 gm/meal); Low Sodium (2 gm)    Anthropometric Measures:  Height: 5' 9.5\" (176.5 cm)  Ideal Body Weight (IBW): 163 lbs (74 kg)    Current Body Weight: 157 lb (71.2 kg) (wt from 7/11/2022), 96.3 % IBW. Current BMI (kg/m2): 22.9  Weight Adjustment For: No Adjustment  BMI Categories: Normal Weight (BMI 18.5-24. 9)    Nutrition Diagnosis:   Predicted inadequate energy intake related to inadequate protein-energy intake, other (comment) (poor appetite) as evidenced by poor intake prior to admission    Nutrition Interventions:   Food and/or Nutrient Delivery: Continue Current Diet, Start Oral Nutrition Supplement  Nutrition Education/Counseling: Education not indicated  Coordination of Nutrition Care: Continue to monitor while inpatient     Goals:  Goals: PO intake 50% or greater     Nutrition Monitoring and Evaluation:   Behavioral-Environmental Outcomes: None Identified  Food/Nutrient Intake Outcomes: Food and Nutrient Intake, Supplement Intake  Physical Signs/Symptoms Outcomes: Biochemical Data, Meal Time Behavior, Skin, Weight    Discharge Planning:     Too soon to determine     Ty JERSEY Adrian, LD  Contact: [phone]

## 2022-07-27 NOTE — H&P
Hospital Medicine History & Physical      PCP: Jaylin Peralta MD    Date of Admission: 7/26/2022    Date of Service: Pt seen/examined on 07/27/22 and Admitted to Inpatient with expected LOS greater than two midnights due to medical therapy. Chief Complaint:  not feeling well      History Of Present Illness:     80-year-old male history of A. 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 Continue to feel fatigued tired, presented to ED for further evaluation. Lives by himself, says he not been eating well, has poor appetite, also, some lightheadedness dizziness. Review of system was positive for increased frequency, wakes up every hour or so at night, often with urgency where sometimes he could make up to the restroom. Is concerned about his wounds and says the dressing needs to be changed every day or so. He also noted that his urine has turned red but unsure for how long the splint on. In the ED afebrile, other vital signs are stable, labs showed sodium 135 BUN of 26 glucose 140. Urine mild leukocytosis 7.3 with left shift. UA was done which was markedly abnormal with small bilirubin large blood positive nitrite, leukocyte esterase, greater than 100 WBCs 4+ bacteria.   Patient admitted to hospital for acute cystitis with hematuria    Past Medical History:          Diagnosis Date    Arthritis     Atrial fibrillation ([address]     Cancer ([address]     SKIN    Carotid artery stenosis     Collar bone fracture 1940'S    RIGHT AND LEFT - NO SURGICAL REPAIR    Diabetes mellitus ([address]     DIET CONTROLLED    Gout     Hyperlipidemia     Hypertension        Past Surgical History:          Procedure Laterality Date    CAROTID ENDARTERECTOMY  4/5/12    R side    CATARACT REMOVAL WITH IMPLANT      RIGHT AND LEFT    COLONOSCOPY      EYE SURGERY laser    OTHER SURGICAL HISTORY      SUTURES LT CHIN AS CHILD     SKIN SPLIT GRAFT  2002    2011    X 3    TONSILLECTOMY         Medications Prior to Admission:      Prior to Admission medications    Medication Sig Start Date End Date Taking? Authorizing Provider   sotalol (BETAPACE) 80 MG tablet TAKE ONE TABLET BY MOUTH EVERY 12 HOURS 4/22/22   Tessa Yao MD   apixaban (ELIQUIS) 2.5 MG TABS tablet TAKE ONE TABLET BY MOUTH TWICE A DAY 4/22/22   Tessa Yao MD   lisinopril (PRINIVIL;ZESTRIL) 5 MG tablet TAKE ONE TABLET BY MOUTH DAILY 4/22/22   Tessa Yao MD   hydroCHLOROthiazide (HYDRODIURIL) 25 MG tablet TAKE ONE TABLET BY MOUTH DAILY in AM 4/22/22   Tessa Yao MD   finasteride (PROSCAR) 5 MG tablet TAKE ONE TABLET BY MOUTH DAILY 4/22/22   Tessa Yao MD   amLODIPine (NORVASC) 10 MG tablet Take 1 tablet by mouth daily 4/22/22   Tessa Yao MD   lisinopril (PRINIVIL;ZESTRIL) 5 MG tablet Take 1 tablet by mouth daily 6/14/21   Cynthia Jang APRN - CNP   Multiple Vitamins-Minerals (THERAPEUTIC MULTIVITAMIN-MINERALS) tablet Take 1 tablet by mouth daily    Historical Provider, MD       Allergies:  Patient has no known allergies. Social History:      The patient currently lives at home alone    TOBACCO:   reports that he quit smoking about 24 years ago. He has a 48.00 pack-year smoking history. He has never used smokeless tobacco.  ETOH:   reports no history of alcohol use. Family History:    Reviewed chart      Problem Relation Age of Onset    Stroke Father     Heart Disease Brother     Diabetes Brother        REVIEW OF SYSTEMS:   Pertinent positives as noted in the HPI. All other systems reviewed and negative. PHYSICAL EXAM PERFORMED:    BP (!) 151/51   Pulse 73   Temp 98.4 °F (36.9 °C) (Oral)   Resp 16   SpO2 97%     General appearance:  No apparent distress, appears stated age and cooperative. HEENT:  Normal cephalic, atraumatic without obvious deformity.  Pupils equal, round, Final Result      1. No acute disease. ASSESSMENT/PLAN:    Active Hospital Problems    Diagnosis Date Noted    Acute cystitis with hematuria [N30.01] 07/26/2022     Priority: Medium     Acute cystitis hematuria, has underlying BPH and is on finasteride, also on apixaban for antibiotics for A. Fib. He is symptomatic with increase urgency, frequency and change in his urine color  Generalized weakness likely due to underlying acute cystitis, physical deconditioning  A. fib on chronic anticoagulation with apixaban  Recent excisions for squamous cell carcinoma of the nose/forehead/scalp  Type 2 diabetes, diet controlled  Hypertension    Plan  Hold apixaban for now  Start Rocephin for UTI  Follow-up urine culture  Urology consult for evaluation  And with straight cath parameters been ordered  Carb controlled diet   Wound care consult for recent surgical wounds  SCDs for DVT prophylaxis for now  PT OT been consulted for further evaluation, he is unable to placement not safe to return back home      DVT Prophylaxis: SCDs  Diet: ADULT DIET; Regular; 4 carb choices (60 gm/meal); Low Sodium (2 gm)  Code Status: Full Code    PT/OT Eval Status: ordered    Dispo - Admit as inpatient. I anticipate hospitalization spanning more than two midnights for investigation and treatment of the above medically necessary diagnoses. Bhavya Colmenares MD    Thank you Marcelene Libman, MD for the opportunity to be involved in this patient's care. If you have any questions or concerns please feel free to contact me at [phone].

## 2022-07-27 NOTE — ED PROVIDER NOTES
1 [facility]  EMERGENCY DEPARTMENT ENCOUNTER          [facility] RESIDENT NOTE       Date of evaluation: 7/26/2022    Chief Complaint     Dizziness (Dizzy/lightheaded/weak x1 week. States \"I feel like I have the flu because I cant even barely walk\")      History of Present Illness     Jose Guadalupe Jesus is a 80 y.o. male with past medical history of A. 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 Patient reports that he has been progressively feeling weak. He reports a poor appetite at baseline and that has worsened in for surgery. On arrival to the ED patient was afebrile and hemodynamically stable. Rate is controlled. Review of systems positive for generalized weakness, lightheadedness. He denied palpitations, vertigo, fevers, chills, chest pain, shortness of breath, nausea, vomiting, abdominal pain, changes in urinary or bowel habits. Physical exam revealed a weak looking male with a weak voice. Chest was clear to auscultation. Abdomen soft and nontender. Bilateral lower extremities without any edema or rash/erythema. Dressing removed from the nose revealed granulation tissue with some yellow-colored discharge which he states is around his baseline. There was no erythema, swelling or warmth around the excision site. Review of Systems     Review of Systems negative except noted in the HPI. Past Medical, Surgical, Family, and Social History     He has a past medical history of Arthritis, Atrial fibrillation ([address] Cancer ([address] Carotid artery stenosis, Collar bone fracture, Diabetes mellitus ([address] Gout, Hyperlipidemia, and Hypertension. He has a past surgical history that includes Tonsillectomy; skin split graft (2002 2011); Cataract removal with implant;  Colonoscopy; other surgical history; Carotid endarterectomy (4/5/12); and eye surgery. His family history includes Diabetes in his brother; Heart Disease in his brother; Stroke in his father. He reports that he quit smoking about 24 years ago. He has a 48.00 pack-year smoking history. He has never used smokeless tobacco. He reports that he does not drink alcohol and does not use drugs. Medications     Previous Medications    AMLODIPINE (NORVASC) 10 MG TABLET    Take 1 tablet by mouth daily    APIXABAN (ELIQUIS) 2.5 MG TABS TABLET    TAKE ONE TABLET BY MOUTH TWICE A DAY    FINASTERIDE (PROSCAR) 5 MG TABLET    TAKE ONE TABLET BY MOUTH DAILY    HYDROCHLOROTHIAZIDE (HYDRODIURIL) 25 MG TABLET    TAKE ONE TABLET BY MOUTH DAILY in AM    LISINOPRIL (PRINIVIL;ZESTRIL) 5 MG TABLET    TAKE ONE TABLET BY MOUTH DAILY    MULTIPLE VITAMINS-MINERALS (THERAPEUTIC MULTIVITAMIN-MINERALS) TABLET    Take 1 tablet by mouth daily    SOTALOL (BETAPACE) 80 MG TABLET    TAKE ONE TABLET BY MOUTH EVERY 12 HOURS       Allergies     He has No Known Allergies. Physical Exam     INITIAL VITALS: BP: 126/65, Temp: 98.4 °F (36.9 °C), Heart Rate: 73, Resp: 16, SpO2: 97 %   Physical Exam  Constitutional:       General: He is not in acute distress. Appearance: He is not toxic-appearing or diaphoretic. HENT:      Nose:      Comments: Surgical excision of the dorsum of base of nose. Eyes:      Extraocular Movements: Extraocular movements intact. Cardiovascular:      Rate and Rhythm: Normal rate and regular rhythm. Pulses: Normal pulses. Heart sounds: Normal heart sounds. No murmur heard. Pulmonary:      Effort: Pulmonary effort is normal.      Breath sounds: Normal breath sounds. Abdominal:      General: Abdomen is flat. There is no distension. Tenderness: There is no abdominal tenderness. There is no guarding. Musculoskeletal:         General: Normal range of motion. Right lower leg: No edema. Left lower leg: No edema.    Skin:     General: Skin is warm.      Findings: No erythema. Neurological:      General: No focal deficit present. Mental Status: He is alert and oriented to person, place, and time. Psychiatric:         Mood and Affect: Mood normal.         Behavior: Behavior normal.         Thought Content: Thought content normal.         Judgment: Judgment normal.       Diagnostic Results     EKG   Interpreted inconjunction with emergency department physician Katherin Lane MD  Rhythm: normal sinus   Rate: normal  Axis: normal  Ectopy: none  Conduction: 1st degree AV block  ST Segments: no acute change  T Waves: no acute change  Q Waves: none  Clinical Impression: no acute changes    RADIOLOGY:  XR CHEST (2 VW)   Final Result      1. No acute disease.                 LABS:   Results for orders placed or performed during the hospital encounter of 07/26/22   CBC with Auto Differential   Result Value Ref Range    WBC 11.3 (H) 4.0 - 11.0 K/uL    RBC 3.94 (L) 4.20 - 5.90 M/uL    Hemoglobin 11.1 (L) 13.5 - 17.5 g/dL    Hematocrit 33.4 (L) 40.5 - 52.5 %    MCV 84.9 80.0 - 100.0 fL    MCH 28.3 26.0 - 34.0 pg    MCHC 33.3 31.0 - 36.0 g/dL    RDW 15.2 12.4 - 15.4 %    Platelets 536 620 - 267 K/uL    MPV 7.6 5.0 - 10.5 fL    Neutrophils % 71.0 %    Lymphocytes % 17.4 %    Monocytes % 9.5 %    Eosinophils % 1.9 %    Basophils % 0.2 %    Neutrophils Absolute 8.0 (H) 1.7 - 7.7 K/uL    Lymphocytes Absolute 2.0 1.0 - 5.1 K/uL    Monocytes Absolute 1.1 0.0 - 1.3 K/uL    Eosinophils Absolute 0.2 0.0 - 0.6 K/uL    Basophils Absolute 0.0 0.0 - 0.2 K/uL   CMP   Result Value Ref Range    Sodium 135 (L) 136 - 145 mmol/L    Potassium 4.6 3.5 - 5.1 mmol/L    Chloride 96 (L) 99 - 110 mmol/L    CO2 27 21 - 32 mmol/L    Anion Gap 12 3 - 16    Glucose 140 (H) 70 - 99 mg/dL    BUN 26 (H) 7 - 20 mg/dL    Creatinine 0.8 0.8 - 1.3 mg/dL    GFR Non-African American >60 >60    GFR African American >60 >60    Calcium 9.7 8.3 - 10.6 mg/dL    Total Protein 7.1 6.4 - 8.2 g/dL dextrose 5 % 50 mL IVPB mini-bag     Order Specific Question:   Antimicrobial Indications     Answer:   Urinary Tract Infection       CONSULTS:  IP CONSULT TO HOSPITALIST    MEDICAL DECISION MAKING / ASSESSMENT / Ignacio Edge is a 80 y.o. male with past medical history of A. fib on anticoagulation, recent excision of squamous cell carcinoma of the dorsal aspect of the nose. The patient lives by himself and has been reporting worsening weakness over the past 1 week. Patient had a largely benign physical exam.    Work-up in the ED reveals a leukocytosis to 11.3k, baseline being in [facility]. CMP unremarkable. Urinalysis suspicious for a UTI with moderate leukocyte esterase and positive nitrite in the urine. Microscopic urinalysis with more than 100 WBC, more than 100 RBC and 4+ bacteria. Patient started on Rocephin in the ED. Received 1 L LR bolus in the ED. Given generalized weakness, failure to thrive and a urine tract infection, it would be appropriate for the patient to be admitted for management as well as PT OT and possible placement. The patient would also require wound care for regular dressing changes inpatient. This patient was also evaluated by the attending physician. All care plans were discussed and agreed upon. Clinical Impression     1.  Urinary tract infection with hematuria, site unspecified        Disposition     DISPOSITION Admitted 07/26/2022 11:09:07 PM       Danella Duverney, MD  Resident  07/26/22 895 [facility] MD Sarah  Resident  07/26/22 1806

## 2022-07-27 NOTE — CONSULTS
Urology Attending Consult Note      Reason for Consultation: Acute cystitis with hematuria    History: 81 yo M who has been seen previously in our office for UTI [facility] failure to [facility] and acute cystitis with hematuria. Reports seeing visible blood in urine a few days ago. Has also been struggling with weakened stream and nocturia. UA suggestive of UTI, culture pending. No recent imaging performed. Started on Rocephin in ER. Family History, Social History, Review of Systems:  Reviewed and agreed to as per chart    Vitals:  BP (!) 138/58   Pulse 78   Temp 98.5 °F (36.9 °C) (Oral)   Resp 16   Ht 5' 9.5\" (1.765 m)   SpO2 96%   BMI 22.85 kg/m²   Temp  Av.4 °F (36.9 °C)  Min: 98.1 °F (36.7 °C)  Max: 98.5 °F (36.9 °C)    Intake/Output Summary (Last 24 hours) at 2022 0929  Last data filed at 2022 0640  Gross per 24 hour   Intake 0 ml   Output 275 ml   Net -275 ml         Physical:  Well developed, well nourished in no acute distress  Mood indicates no abnormalities. Pt doesnt appear depressed  Orientated to time and place  Neck is supple, trachea is midline  Respiratory effort is normal  Cardiovascular show no extremity swelling  Abdomen no masses or hernias are palpated, there is no tenderness. Liver and Spleen appear normal.  Skin show no abnormal lesions  Lymph nodes are not palpated in the inguinal, neck, or axillary area.      Male :  Urine unable to be visualized    Labs:  WBC:    Lab Results   Component Value Date/Time    WBC 11.3 2022 08:22 PM     Hemoglobin/Hematocrit:    Lab Results   Component Value Date/Time    HGB 11.1 2022 08:22 PM    HCT 33.4 2022 08:22 PM     BMP:    Lab Results   Component Value Date/Time     2022 08:22 PM    K 4.6 2022 08:22 PM    CL 96 2022 08:22 PM    CO2 27 2022 08:22 PM    BUN 26 2022 08:22 PM    LABALBU 3.9 2022 08:22 PM    CREATININE 0.8 2022 08:22 PM    CALCIUM 9.7 07/26/2022 08:22 PM    GFRAA >60 07/26/2022 08:22 PM    GFRAA >60 04/16/2013 03:00 PM    LABGLOM >60 07/26/2022 08:22 PM     PT/INR:    Lab Results   Component Value Date/Time    PROTIME 12.1 04/10/2012 08:43 AM    INR 1.11 04/10/2012 08:43 AM     PTT:    Lab Results   Component Value Date/Time    APTT 30.4 04/10/2012 08:43 AM   [APTT    Urinalysis: Positive    Urine Culture: Pending    Antibiotic Therapy: Rocephin    Imaging: No new  imaging     Impression/Plan: 81 yo M admitted with failure to thrive and UTI. We were consulted for recs. -Reporting visible blood in urine a few days ago.  Urine sample unable to be viewed on rounds this AM  -Due to increasing LUTS, will start flomax  -Will obtain CT abd pelvis w/wo contrast to check for hematuria source  -Continue IV abx, culture pending  -Call with any questions     JUDY Ceron

## 2022-07-27 NOTE — PROGRESS NOTES
Physician Progress Note      Alejandra Lund  CSN #:                  [id number]  :                       1933  ADMIT DATE:       2022 6:29 PM  [address] [place] DATE:  RESPONDING  PROVIDER #:        Stephie Jenkins MD          QUERY TEXT:    Patient admitted with cystitis w/ hematuria, noted to have Paroxysmal atrial   fibrillation and is maintained on Eliquis. If possible, please document in   progress notes and discharge summary if you are evaluating and/or treating any   of the following: The medical record reflects the following:  Risk Factors: pAF on Eliquis  Clinical Indicators: Per Cardiology office visit 22 \"Paroxysmal Atrial   Fibrillation,  [id number] score: 4 (HTN, DM, age)\"  Treatment: Eliquis held currently for hematuria  Options provided:  -- Secondary hypercoagulable state in a patient with atrial fibrillation  -- Other - I will add my own diagnosis  -- Disagree - Not applicable / Not valid  -- Disagree - Clinically unable to determine / Unknown  -- Refer to Clinical Documentation Reviewer    PROVIDER RESPONSE TEXT:    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 Query created by:  Chris Gleason on 2022 12:15 PM      Electronically signed by:  Stephie Jenkins MD 2022 2:18 PM

## 2022-07-27 NOTE — PROGRESS NOTES
Physical Therapy  Facility/Department: [address] [place] [address]  Physical Therapy Initial Assessment    Name: Genesis Landaverde  : 1933  MRN: [id number]  Date of Service: 2022    Discharge Recommendations: Genesis Landaverde scored a 19/24 on the AM-PAC short mobility form. At this time, no further PT is recommended upon discharge as pt is presenting at/near his baseline function. Recommend patient returns to prior setting with prior services. PT Equipment Recommendations  Equipment Needed: No      Patient Diagnosis(es): The encounter diagnosis was Urinary tract infection with hematuria, site unspecified. Past Medical History:  has a past medical history of Arthritis, Atrial fibrillation ([address] Cancer ([facility] [address] Carotid artery stenosis, Collar bone fracture, Diabetes mellitus ([facility] [address] Gout, Hyperlipidemia, and Hypertension. Past Surgical History:  has a past surgical history that includes Tonsillectomy; skin split graft (2002); Cataract removal with implant; Colonoscopy; other surgical history; Carotid endarterectomy (12); and eye surgery. Assessment   Body Structures, Functions, Activity Limitations Requiring Skilled Therapeutic Intervention: Decreased functional mobility ; Decreased strength;Decreased endurance;Decreased balance  Assessment: Pt is n 79 y/o male who presents to the hospital with acute cystitic with hematuria. Prior to admission to the hospital, the pt was independent with functional mobility tasks and ADLs without the use of an AD. Today, the pt required SBA for functional mobility tasks including ambulation and stair mobility. The pt ambulated with shuffling steps however demonstrated steadiness and safety. The pt is safe to return home at this time however will continue to benefit from acute skilled PT services to facilitate return to [facility] and to promote independence.   Treatment Diagnosis: Impaired functional mobility  Therapy Prognosis: Good  Decision Making: Low Complexity  Barriers to Learning: None  Requires PT Follow-Up: Yes  Activity Tolerance  Activity Tolerance Comments: Pt tolerated the PT initial evaluation with no reports of dizziness, lightheadedness, or SOB. There were no significant limitaions     Plan   Plan  Plan: 3-5 times per week  Current Treatment Recommendations: Strengthening, Balance training, Functional mobility training, Gait training, Stair training, Endurance training, Patient/Caregiver education & training, Safety education & training, Home exercise program, Therapeutic activities  Safety Devices  Type of Devices: Call light within reach, Chair alarm in place, Left in chair, Gait belt, Nurse notified  Restraints  Restraints Initially in Place: No     Restrictions  Restrictions/Precautions  Restrictions/Precautions: Fall Risk, Up as Tolerated (High fall risk)  Required Braces or Orthoses?: No  Position Activity Restriction  Other position/activity restrictions: up with assist     Subjective   General  Chart Reviewed: Yes  Patient assessed for rehabilitation services?: Yes  Additional Pertinent Hx: 80-year-old male history of A. 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 Continue to feel fatigued tired, presented to ED for further evaluation. Lives by himself, says he not been eating well, has poor appetite, also, some lightheadedness dizziness. Review of system was positive for increased frequency, wakes up every hour or so at night, often with urgency where sometimes he could make up to the restroom. Is concerned about his wounds and says the dressing needs to be changed every day or so. He also noted that his urine has turned red but unsure for how long the splint on. In the ED afebrile, other vital signs are stable, labs showed sodium 135 BUN of 26 glucose 140. nose.      AROM RLE (degrees)  RLE General AROM: Grossly WFL based on observed functional mobility  AROM LLE (degrees)  LLE General AROM: Grossly WFL based on observed functional mobility  Strength RLE  Comment: Grossly at least 3/5 based on observed functional mobility  Strength LLE  Comment: Grossly at least 3/5 based on observed functional mobility     Bed mobility  Supine to Sit: Stand by assistance (HOB elevated, increased time to complete task, and use of bed rail)  Scooting: Stand by assistance  Transfers  Sit to Stand: Stand by assistance  Stand to sit: Stand by assistance  Bed to Chair: Stand by assistance  Ambulation  Surface: level tile  Device: No Device  Assistance: Stand by assistance  Quality of Gait: Decreased sachi, decreased step length B, mildly flexed posture, shuffling gait, steady with no overt LOB  Distance: 200'  Stairs/Curb  Stairs?: Yes  Stairs  # Steps : 10  Stairs Height: 6\"  Rails: Left ascending  Device: No Device  Assistance: Stand by assistance  Comment: Reciprocal pattern on ascent and descent, slightly decreased eccentric control on descent     Balance  Sitting - Static:  (Pt is independent with static sitting balance)  Standing - Static:  (Pt requires SBA for static standing balance without the use of an AD)  Standing - Dynamic:  (Pt requires SBA for dynamic standing balance tasks)     AM-PAC Score  AM-PAC Inpatient Mobility Raw Score : 19 (07/27/22 1250)  AM-PAC Inpatient T-Scale Score : 45.44 (07/27/22 1250)  Mobility Inpatient CMS 0-100% Score: 41.77 (07/27/22 1250)  Mobility Inpatient CMS G-Code Modifier : CK (07/27/22 1250)      Goals  Short Term Goals  Time Frame for Short term goals: By discharge  Short term goal 1: Pt will transfer supine to/from sit with mod I  Short term goal 2: Pt will transfer sit to/from stand with mod I  Short term goal 3: Pt will amb 250' with no AD and mod I  Short term goal 4: Pt will asc/dec 4 steps with L HR and mod I  Patient Goals   Patient goals :  To return home     Education  Patient Education  Education Given To: Patient  Education Provided: Role of Therapy;Plan of Care  Education Method: Verbal  Barriers to Learning: None  Education Outcome: Verbalized understanding;Continued education needed    Therapy Time   Individual Concurrent Group Co-treatment   Time In 1228         Time Out 1252         Minutes 24         Timed Code Treatment Minutes: [address], PT   Vicki Winter PT, DPT [id number]

## 2022-07-27 NOTE — PROGRESS NOTES
Hospital Medicine Progress Note    PCP: Stanly Scheuermann, MD    Date of Admission: 7/26/2022    Date of Service: Pt seen/examined on 07/27/22 and Admitted to Inpatient with expected LOS greater than two midnights due to medical therapy. Chief Complaint:  not feeling well      History Of Present Illness:     26-year-old male history of A. fib on anticoagulation, recent excisions of squamous cell carcinoma of on his scalp, forehead and nose was on pain medications which is not making him feel well, he then transition to taking Tylenol 2 tablets twice daily. Continue to feel fatigued tired, presented to ED for further evaluation. Lives by himself, says he not been eating well, has poor appetite, also, some lightheadedness dizziness. Review of system was positive for increased frequency, wakes up every hour or so at night, often with urgency where sometimes he could make up to the restroom. Is concerned about his wounds and says the dressing needs to be changed every day or so. He also noted that his urine has turned red but unsure for how long the splint on. In the ED afebrile, other vital signs are stable, labs showed sodium 135 BUN of 26 glucose 140. Urine mild leukocytosis 7.3 with left shift. UA was done which was markedly abnormal with small bilirubin large blood positive nitrite, leukocyte esterase, greater than 100 WBCs 4+ bacteria. Patient admitted to hospital for acute cystitis with hematuria      Interval HPI  No new complaints    Inspected wound with wound nurse: sme mild erythema and minimal purulent drainage noted from nasal wound with ostomy on nose. Medications: Reviewed        Allergies:  Patient has no known allergies. REVIEW OF SYSTEMS:   Pertinent positives as noted in the HPI. All other systems reviewed and negative.         PHYSICAL EXAM PERFORMED:    BP (!) 138/58   Pulse 78   Temp 98.5 °F (36.9 °C) (Oral)   Resp 16   Ht 5' 9.5\" (1.765 m)   SpO2 96% BMI 22.85 kg/m²     General appearance:  No apparent distress, appears stated age and cooperative. HEENT:  As above. Normal cephalic, atraumatic without obvious deformity. Neck: Supple, with full range of motion. No jugular venous distention. Respiratory:  Normal respiratory effort. Clear to auscultation, bilaterally without Rales/Wheezes/Rhonchi. Cardiovascular:  Regular rate and rhythm with normal S1/S2 without murmurs, rubs or gallops. Abdomen: Soft, non-tender, non-distended with normal bowel sounds. Lower abdominal fullness  Musculoskeletal:  No clubbing, cyanosis or edema bilaterally. Full range of motion without deformity. Skin: Surgical wounds covered with dressing  Neurologic:  grossly non-focal.  Psychiatric:  Alert and oriented, thought content appropriate, normal insight  Capillary Refill: Brisk,< 3 seconds   Peripheral Pulses: +2 palpable, equal bilaterally       Labs:     Recent Labs     07/26/22 2022   WBC 11.3*   HGB 11.1*   HCT 33.4*          Recent Labs     07/26/22 2022   *   K 4.6   CL 96*   CO2 27   BUN 26*   CREATININE 0.8   CALCIUM 9.7       Recent Labs     07/26/22 2022   AST 13*   ALT 14   BILITOT 0.5   ALKPHOS 89       No results for input(s): INR in the last 72 hours.   Recent Labs     07/26/22 2022   TROPONINI <0.01         Urinalysis:      Lab Results   Component Value Date/Time    NITRU POSITIVE 07/26/2022 09:32 PM    WBCUA >100 07/26/2022 09:32 PM    BACTERIA 4+ 07/26/2022 09:32 PM    RBCUA >100 07/26/2022 09:32 PM    BLOODU LARGE 07/26/2022 09:32 PM    SPECGRAV 1.020 07/26/2022 09:32 PM    GLUCOSEU Negative 07/26/2022 09:32 PM       Radiology:     CXR: I have reviewed the CXR with the following interpretation: No consolidation effusion normal  EKG:  I have reviewed the EKG with the following interpretation: Reviewed, normal sinus rhythm, first-degree AV block but no acute ST or T wave changes to indicate ischemia or infarction    XR CHEST (2 VW)   Final Result 1. No acute disease. ASSESSMENT/PLAN:      Complicated UTI: POA  - Presented with  Acute cystitis with hematuria and generalized weakness    BPH with chronic urinary retention: POA  - R/o acute retention    Generalized weakness/deconditioning: POA  Failure to thrive in adult: POA    Parox A. fib on chronic anticoagulation with apixaban: POA      Squamous cell carcinoma of the nose/forehead/scalp  - S/p Recent excisions   - Possible surgical site wound infection: POA      Type 2 diabetes, diet controlled  Hypertension        He presented with fatigue, generalized weakness. Noted with hematuria and positive UA: He admits to increased urgency, frequency and hematuria      Continue Rocephin pending cx result    Obtain wound cx from nose    Apixaban held on admit. Monitor hgb., and resume if stable/no signif bleed    Urology consulted on admission: CT abd pelvis w/wo contrast to check for hematuria source    Start Flomax; monitor for acute retention    Wound care consult for recent surgical wounds    SCDs for DVT prophylaxis for now    PT OT been consulted for further evaluation, he is unable to placement not safe to return back home      DVT Prophylaxis: SCDs  Diet: ADULT DIET; Regular; 4 carb choices (60 gm/meal);  Low Sodium (2 gm)  ADULT ORAL NUTRITION SUPPLEMENT; Lunch, Dinner; Diabetic Oral Supplement  Code Status: Full Code    PT/OT Eval Status: ordered    Disposition:   Pending progress  PT/OT eval       Ravi Whitehead MD

## 2022-07-27 NOTE — PROGRESS NOTES
Occupational Therapy  Facility/Department: [address]  Occupational Therapy Initial Assessment, Treatment, and D/C Note    Name: Dariela Amaya  : 1933  MRN: [id number]  Date of Service: 2022    Discharge Recommendations: Dariela Amaya scored a 22/24 on the AM-PAC ADL Inpatient form. At this time, no further OT is recommended upon discharge. Recommend patient returns to prior setting with prior services. Patient Diagnosis(es): The encounter diagnosis was Urinary tract infection with hematuria, site unspecified. Past Medical History:  has a past medical history of Arthritis, Atrial fibrillation ([facility] [address] Cancer ([facility] [address] Carotid artery stenosis, Collar bone fracture, Diabetes mellitus ([facility] [address] Gout, Hyperlipidemia, and Hypertension. Past Surgical History:  has a past surgical history that includes Tonsillectomy; skin split graft (2002); Cataract removal with implant; Colonoscopy; other surgical history; Carotid endarterectomy (12); and eye surgery. Assessment   Assessment: Pt from home alone. Pt functioning at baseline per pt report. Pt demo supervision with functional mobility / transfers and ADLs. Pt reports he has assist from niece for ADLs/IADLs prn. Pt edu on home safety - verb understanding. No further acute OT services indicated. Recommend home w/ assist prn. Will sign off from OT services. REQUIRES OT FOLLOW-UP: No  Activity Tolerance  Activity Tolerance: Patient Tolerated treatment well  Activity Tolerance Comments: Pt completed Mod activity with no ENCINAS noted. Restrictions  Position Activity Restriction  Other position/activity restrictions: up with assist    Subjective   General  Chart Reviewed: Yes  Patient assessed for rehabilitation services?: Yes  Additional Pertinent Hx: Admit  with urinary tract infection with hematuria. Pt presents to ED with fatigue and generalized weakness.  PMHX: A. fib on Eliquis, diabetes mellitus, hyperlipidemia, hypertension, squamous cell carcinoma of the skin overlying dorsum of the nose s/p resection 3 weeks ago  Family / Caregiver Present: No  Diagnosis: UTI with hematuria  Subjective  Subjective: Pt found resting in bed. Pt agreeablle to OOB OT eval and tx. Social/Functional History  Social/Functional History  Lives With: Alone  Type of Home: Trailer  Home Access: Stairs to enter with rails  Entrance Stairs - Number of Steps: 4  Entrance Stairs - Rails: Right  Bathroom Shower/Tub: Tub/Shower unit, Shower chair with back  Bathroom Equipment: Shower chair  Home Equipment: Ouner, rolling  Has the patient had two or more falls in the past year or any fall with injury in the past year?: No  Receives Help From: Family (niece)  ADL Assistance: [address] [place] [address] Avenue: Independent  Homemaking Responsibilities: Yes  Ambulation Assistance: Independent  Transfer Assistance: Independent  Active : Yes  Occupation: Retired  Type of Occupation:        Objective Treatment included functional transfer training, ADL's and pt. education. Safety Devices  Type of Devices: Chair alarm in place;Call light within reach; Left in chair;Nurse notified     Toilet Transfers  Toilet - Technique: Ambulating  Equipment Used: Standard toilet  Toilet Transfer: Supervision  AROM: Generally decreased, functional  Strength: Generally decreased, functional  Tone: Normal  Sensation: Intact  ADL  LE Dressing: Independent  LE Dressing Skilled Clinical Factors: Pt able to don/doff socks indep.   Toileting: Independent        Bed mobility  Supine to Sit: Modified independent  Scooting: Independent  Bed Mobility Comments: HOB up  Transfers  Sit to stand: Supervision  Stand to sit: Supervision  Vision  Vision: Impaired  Vision Exceptions: Wears glasses for reading  Hearing  Hearing: Within functional limits  Cognition  Overall Cognitive Status: WFL  Orientation  Overall Orientation Status: Within Functional Limits                  Education Given To: Patient  Education Provided: Role of Therapy;Transfer Training;Plan of Care;Energy Conservation  Education Provided Comments: Pt edu on hand placement during transfers, dressing tech, and EC tech - verb understanding. Education Method: Verbal  Barriers to Learning: None  Education Outcome: Verbalized understanding       AM-PAC Score  AM-PAC Inpatient Daily Activity Raw Score: 22 (07/27/22 0957)  AM-PAC Inpatient ADL T-Scale Score : 47.1 (07/27/22 0957)  ADL Inpatient CMS 0-100% Score: 25.8 (07/27/22 0957)  ADL Inpatient CMS G-Code Modifier : CJ (07/27/22 0957)        Therapy Time   Individual Concurrent Group Co-treatment   Time In 0852         Time Out 0930         Minutes 38             Timed Code Treatment Minutes:   25 mins    Total Treatment Minutes:  38 mins      RIKI Rogers    Therapist was present, directed the patients care, made skilled judgement and was responsible for assessment and treatment of the patient.      Satish Rodriguez  MS, OTR/L #[id number]

## 2022-07-27 NOTE — CARE COORDINATION
Cm following, spoke with pt at bedside, from home alone has family support with Niece at home. Spoke about PT OT marcus and recs for [facility], pt declines stated that he walked the entire loop and had help at home he didn't need HHC. Advised of he changes his mind to notify me ans HHc can be set up.    IV ABX for +UTI   Electronically signed by Tiffanie Woo RN on 7/27/2022 at 2:12 PM   [phone]

## 2022-07-27 NOTE — PROGRESS NOTES
Pt has been a&o x4 for shift. Standby assist x1 when ambulating. Pt seen by  nurse for care of surgical sites ( see note). Pt has denied pain throughout shift. Tolerating diet w/o n/v. Pt denies any outstanding needs at this time. Will continue to monitor.

## 2022-07-27 NOTE — PLAN OF CARE
Problem: Safety - Adult  Goal: Free from fall injury  7/27/2022 0758 by Luh Edmondson RN  Outcome: Progressing Towards Goal  7/27/2022 0401 by Jessica Darby RN  Outcome: Progressing Towards Goal     Problem: Infection - Adult  Goal: Absence of infection at discharge  7/27/2022 0758 by Luh Edmondson RN  Outcome: Progressing Towards Goal  7/27/2022 0401 by Jessica Darby RN  Outcome: Progressing Towards Goal  Goal: Absence of infection during hospitalization  Outcome: Progressing Towards Goal  Goal: Absence of fever/infection during anticipated neutropenic period  Outcome: Progressing Towards Goal     Problem: ABCDS Injury Assessment  Goal: Absence of physical injury  Outcome: Progressing Towards Goal     Problem: Discharge Planning  Goal: Discharge to home or other facility with appropriate resources  Outcome: Progressing Towards Goal     Problem: Chronic Conditions and Co-morbidities  Goal: Patient's chronic conditions and co-morbidity symptoms are monitored and maintained or improved  Outcome: Progressing Towards Goal Writer called pt with results.  LM to return call to the office.

## 2022-07-27 NOTE — PLAN OF CARE
Problem: Safety - Adult  Goal: Free from fall injury  Outcome: Progressing Towards Goal   Pt has non skid socks, call light within reach, and bed alarm on. Problem: Infection - Adult  Goal: Absence of infection at discharge  Outcome: Progressing Towards Goal   Pt has been afebrile.

## 2022-07-27 NOTE — ADT AUTH CERT
[phone] [facility]) Deshawn Pel (E)   [phone] [facility])       [address] [de-identified]  CVG Subscriber Name/Sex/Relation Subscriber  Subscriber Address/Phone Subscriber Emp/Emp Phone   Hayes Perla Page   [id number] Male   (Self) 1933 [address]   [address] [place] [address] Osteopathy, [facility]   [phone](Q) RETIRED        Utilization Reviews         Physician advisor recommends inpatient by Jess Carr RN       Review Status Review Entered   In Primary 2022 15:49      Criteria Review   slr keep in    We recommend that the following pt's current hospitalization under Inpatient status Is appropriate   If you agree, please place a new ADMIT order in [facility] as recommended.     Name: Jhonny Minus   : 1933   CSN: [id number]     Clinical summary 79 yo fatigue, hematuria  Vitals VSS  Labs and Imaging UA (+), cxr neg   MCG criteria applies yes,   Comments Antxbx, urol eval,       This chart was reviewed at 3:43 PM 2022    [facility] [employer]   CELL : [phone]        Urinary Tract Infection (UTI) - Care Day 2 (2022) by Jess Carr RN       Review Status Review Entered   Completed 2022 15:28      Criteria Review      Care Day: 2 Care Date: 2022 Level of Care: Inpatient Floor    Guideline Day 2    Level Of Care    (X) Floor    2022 3:28 PM EDT by Ryan Liz      m/s    Clinical Status    (X) * Hypotension absent    2022 3:28 PM EDT by Aba Buckley    (X) * Mental status at baseline    2022 3:28 PM EDT by Ryan Liz      no issues noted    (X) * Afebrile or fever improved    2022 3:28 PM EDT by yRan Liz      97.7    (X) * Vomiting absent or improved    2022 3:28 PM EDT by Ryan Liz      no issues noted    Activity    (X) * Ambulatory    2022 3:28 PM EDT by Ryan Liz      as pawan    Routes    (X) IV medications Gnosticism with failure to thrive and acute cystitis with hematuria. Reports seeing visible blood in urine a few days ago. Has also been struggling with weakened stream and nocturia. UA suggestive of UTI, culture pending. No recent imagingperformed. Started on Rocephin in ER. Impression/Plan: 79 yo M admitted with failure to thrive and UTI. We were consulted for recs. -Reporting visible blood in urine a few days ago.  Urine sample unable to be viewed on rounds this AM   -Due to increasing LUTS, will start flomax   -Will obtain CT abd pelvis w/wo contrast to check for hematuria source   -Continue IV abx, culture pending   -Call with any questions          Summary- Patient in Med/surg unit          Patient receiving Norvasc 10mg PO x1, Proscar 5mg PO x1, Prinivil 5mg PO x1, Betapace 80mg PO BID, Flomax 0.4mg PO x1,             Urinary Tract Infection (UTI) - Care Day 1 (7/26/2022) by Corie Rubio RN       Review Status Review Entered   Completed 7/27/2022 15:18      Criteria Review      Care Day: 1 Care Date: 7/26/2022 Level of Care: Inpatient Floor    Guideline Day 1    Level Of Care    (X) Floor    7/27/2022 3:18 PM EDT by Lord Reynolds      m/s    (X) Readmission Risk Assessment    7/27/2022 3:18 PM EDT by Lord Reynolds      15%    Clinical Status    (X) * Clinical Indications met    7/27/2022 3:18 PM EDT by Lord Reynolds      met    Activity    (X) Activity as tolerated    7/27/2022 3:18 PM EDT by Lord Reynolds      as pawan    Routes    (X) IV fluids    7/27/2022 3:18 PM EDT by Lord Reynolds      bolus    (X) IV medications    7/27/2022 3:18 PM EDT by Lord Reynolds      noted    Interventions    (X) Urinalysis and urine culture    7/27/2022 3:18 PM EDT by Lord Reynolds      UA noted    (X) Renal function tests, CBC    7/27/2022 3:18 PM EDT by Lord Reynolds      labs noted    Medications    (X) Antibiotics    * Milestone        Urinary Tract Infection (UTI) - Clinical Indications for Admission to Inpatient Care by Lucas Vazquez RN       Review Status Review Entered   Completed 7/27/2022 15:17      Criteria Review      Clinical Indications for Admission to Inpatient Care    Most Recent : Nicki Roberts Most Recent Date: 7/27/2022 3:17 PM EDT    (X) Admission is indicated for  1 or more  of the following  (1) (2) (3) (4) (5) (6) (7) (8):       (X) Persistence or worsening of clinical finding (eg, fever, pain, dehydration, vomiting) despite       observation care       7/27/2022 3:17 PM EDT by Nicki Roberts         dizzyness, weaknes x1week     (X) Other Indication: Other      Entered 7/27/2022 3:17 PM EDT by Nicki Roberts     WBC 11.3  hematuria   Additional Notes   7/26/22      Patient presented to the ED with complaints of Dizziness (Dizzy/lightheaded/weak x1 week. States \"I feel like I have the flu because I cant even barely walk\")          VS- temp 98.4, HR 73, RR 16, /49, O2 95%          Labs- Na 135, Cl 96, BUN 26, Glucose 140, AST 13, WBC 11.3, Hgb 11.1,       UA- small bili, large blood, protein 30, nitrite +, moderate leukocytes, WBC >100, RBC >100, bacteria 4+      CXR-   No acute disease. EKG-   Rhythm: normal sinus    Rate: normal   Axis: normal   Ectopy: none   Conduction: 1st degree AV block   ST Segments: no acute change   T Waves: no acute change   Q Waves: none   Clinical Impression: no acute changes      Medical history- Skin cancer, HTN, HLD, Arthritis, Gout, DM, Carotid artery stenosis, A-fib          Home medications- Norvasc, Eliquis, Proscar, Hydrodiuril, Prinivil, Multivitamin, Betapace          ED notes-    Ren Najera is a 80 y.o. male with past medical history of A. 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   Patient reports that he has been progressively feeling weak. He reports a poor appetite at baseline and that has worsened in for surgery. On arrival to the ED patient was afebrile and hemodynamically stable. Rate is controlled. Review of systems positive for generalized weakness, lightheadedness. He denied palpitations, vertigo, fevers, chills, chest pain, shortness of breath, nausea, vomiting, abdominal pain, changes in urinary or bowel habits. Physical exam revealed a weak looking male with a weak voice. Chest was clear to auscultation. Abdomen soft and nontender. Bilateral lower extremities without any edema or rash/erythema. Dressing removed from the nose revealed granulation tissue with some yellow-colored discharge which he states is around his baseline. There was no erythema, swelling or warmth around the excision site. Constitutional:        General: He is not in acute distress. Appearance: He is not toxic-appearing or diaphoretic. HENT:       Nose:       Comments: Surgical excision of the dorsum of base of nose. Eyes:       Extraocular Movements: Extraocular movements intact. Cardiovascular:       Rate and Rhythm: Normal rate and regular rhythm. Pulses: Normal pulses. Heart sounds: Normal heart sounds. No murmur heard. Pulmonary:       Effort: Pulmonary effort is normal.       Breath sounds: Normal breath sounds. Abdominal:       General: Abdomen is flat. There is no distension. Tenderness: There is no abdominal tenderness. There is no guarding. Musculoskeletal:          General: Normal range of motion. Right lower leg: No edema. Left lower leg: No edema. Skin:      General: Skin is warm. Findings: No erythema. Neurological:       General: No focal deficit present. Mental Status: He is alert and oriented to person, place, and time.     Psychiatric:          Mood and Affect: Mood normal.          Behavior: Behavior normal. Thought Content: Thought content normal.          Judgment: Judgment normal.        MEDICAL DECISION MAKING / ASSESSMENT / Danis Matt is a 80 y.o. male with past medical history of A. fib on anticoagulation, recent excision of squamous cell carcinoma of the dorsal aspect of the nose. The patient lives by himself and has been reporting worsening weakness over the past 1 week. Patient had a largely benign physical exam.       Work-up in the ED reveals a leukocytosis to 11.3k, baseline being in [facility]. CMP unremarkable. Urinalysis suspicious for a UTI with moderate leukocyte esterase and positive nitrite in the urine. Microscopic urinalysis with more than 100 WBC, more than 100 RBC and 4+ bacteria. Patient started on Rocephin in the ED. Received 1 L LR bolus in the ED. Given generalized weakness, failure to thrive and a urine tract infection, it would be appropriate for the patient to be admitted for management as well as PT OT and possible placement. The patient would also require wound care for regular dressing changes inpatient. This patient was also evaluated by the attending physician. All care plans were discussed and agreed upon. Clinical Impression   1. Urinary tract infection with hematuria, site unspecified               H&P-    Date of Service: Pt seen/examined on 07/27/22 and Admitted to Inpatient with expected LOS greater than two midnights due to medical therapy. Chief Complaint:  not feeling well       History Of Present Illness:   51-year-old male history of A. 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 Continue to feel fatigued tired, presented to ED for further evaluation. Lives by himself, says he not been eating well, has poor appetite, also, some lightheadedness dizziness. Review of system was positive for increased frequency, wakes up every hour or so at night, often with urgency where sometimes he could make up to the restroom. Is concerned about his wounds and says the dressing needs to be changed every day or so. He also noted that his urine has turned red but unsure for how long the splint on. In the ED afebrile, other vital signs are stable, labs showed sodium 135 BUN of 26 glucose 140. Urine mild leukocytosis 7.3 with left shift. UA was done which was markedly abnormal with small bilirubin large blood positive nitrite, leukocyte esterase, greater than 100 WBCs 4+ bacteria. Patient admitted to hospital for acute cystitis with hematuria           Acute cystitis hematuria, has underlying BPH and is on finasteride, also on apixaban for antibiotics for A. Fib. He is symptomatic with increase urgency, frequency and change in his urine color   Generalized weakness likely due to underlying acute cystitis, physical deconditioning   A. fib on chronic anticoagulation with apixaban   Recent excisions for squamous cell carcinoma of the nose/forehead/scalp   Type 2 diabetes, diet controlled   Hypertension      Plan   Hold apixaban for now   Start Rocephin for UTI   Follow-up urine culture   Urology consult for evaluation   And with straight cath parameters been ordered   Carb controlled diet    Wound care consult for recent surgical wounds   SCDs for DVT prophylaxis for now   PT OT been consulted for further evaluation, he is unable to placement not safe to return back home           DVT Prophylaxis: SCDs   Diet: ADULT DIET; Regular; 4 carb choices (60 gm/meal); Low Sodium (2 gm)   Code Status: Full Code       PT/OT Eval Status: ordered       Dispo - Admit as inpatient. I anticipate hospitalization spanning more than two midnights for investigation and treatment of the above medically necessarydiagnoses.          Patient received Rocephin 1000mg IV x1, Lactated ringers IV 1000ml x1,

## 2022-07-27 NOTE — PROGRESS NOTES
4 Eyes Admission Assessment     I agree as the admission nurse that 2 RN's have performed a thorough Head to Toe Skin Assessment on the patient. ALL assessment sites listed below have been assessed on admission. Areas assessed by both nurses:   [x]   Head, Face, and Ears   [x]   Shoulders, Back, and Chest  [x]   Arms, Elbows, and Hands   [x]   Coccyx, Sacrum, and Ischium  [x]   Legs, Feet, and Heels        Does the Patient have Skin Breakdown?   Yes a wound was noted on the Admission Assessment and an LDA was Initiated documentation include the Emma-wound, Wound Assessment, Measurements, Dressing Treatment, Drainage, and Color\",         Christofer Prevention initiated:  Yes   Wound Care Orders initiated:  Yes      [address] nurse consulted for Pressure Injury (Stage 3,4, Unstageable, DTI, NWPT, and Complex wounds) or Christofer score 18 or lower:  No      Nurse 1 eSignature: Electronically signed by Jessica Darby RN on 7/27/22 at 2:00 AM EDT    **SHARE this note so that the co-signing nurse is able to place an eSignature**    Nurse 2 eSignature: Electronically signed by Jose Guadalupe Stovall RN on 7/27/22 at 3:46 AM EDT

## 2022-07-28 VITALS
OXYGEN SATURATION: 93 % | HEIGHT: 70 IN | DIASTOLIC BLOOD PRESSURE: 57 MMHG | BODY MASS INDEX: 22.85 KG/M2 | HEART RATE: 75 BPM | RESPIRATION RATE: 16 BRPM | TEMPERATURE: 98.3 F | SYSTOLIC BLOOD PRESSURE: 111 MMHG

## 2022-07-28 LAB
ANION GAP SERPL CALCULATED.3IONS-SCNC: 13 MMOL/L (ref 3–16)
BASOPHILS ABSOLUTE: 0 K/UL (ref 0–0.2)
BASOPHILS RELATIVE PERCENT: 0.3 %
BUN BLDV-MCNC: 28 MG/DL (ref 7–20)
CALCIUM SERPL-MCNC: 9.9 MG/DL (ref 8.3–10.6)
CHLORIDE BLD-SCNC: 97 MMOL/L (ref 99–110)
CO2: 26 MMOL/L (ref 21–32)
CREAT SERPL-MCNC: 0.7 MG/DL (ref 0.8–1.3)
EOSINOPHILS ABSOLUTE: 0.3 K/UL (ref 0–0.6)
EOSINOPHILS RELATIVE PERCENT: 2.5 %
GFR AFRICAN AMERICAN: >60
GFR NON-AFRICAN AMERICAN: >60
GLUCOSE BLD-MCNC: 128 MG/DL (ref 70–99)
HCT VFR BLD CALC: 31.3 % (ref 40.5–52.5)
HEMOGLOBIN: 10.4 G/DL (ref 13.5–17.5)
LYMPHOCYTES ABSOLUTE: 1.8 K/UL (ref 1–5.1)
LYMPHOCYTES RELATIVE PERCENT: 17.3 %
MCH RBC QN AUTO: 29.1 PG (ref 26–34)
MCHC RBC AUTO-ENTMCNC: 33.2 G/DL (ref 31–36)
MCV RBC AUTO: 87.7 FL (ref 80–100)
MONOCYTES ABSOLUTE: 1.3 K/UL (ref 0–1.3)
MONOCYTES RELATIVE PERCENT: 12.4 %
NEUTROPHILS ABSOLUTE: 7.1 K/UL (ref 1.7–7.7)
NEUTROPHILS RELATIVE PERCENT: 67.5 %
PDW BLD-RTO: 15.7 % (ref 12.4–15.4)
PLATELET # BLD: 291 K/UL (ref 135–450)
PMV BLD AUTO: 7.5 FL (ref 5–10.5)
POTASSIUM SERPL-SCNC: 4.6 MMOL/L (ref 3.5–5.1)
RBC # BLD: 3.57 M/UL (ref 4.2–5.9)
SODIUM BLD-SCNC: 136 MMOL/L (ref 136–145)
WBC # BLD: 10.5 K/UL (ref 4–11)

## 2022-07-28 PROCEDURE — 85025 COMPLETE CBC W/AUTO DIFF WBC: CPT

## 2022-07-28 PROCEDURE — 97110 THERAPEUTIC EXERCISES: CPT

## 2022-07-28 PROCEDURE — 2580000003 HC RX 258: Performed by: INTERNAL MEDICINE

## 2022-07-28 PROCEDURE — 6370000000 HC RX 637 (ALT 250 FOR IP): Performed by: INTERNAL MEDICINE

## 2022-07-28 PROCEDURE — 97116 GAIT TRAINING THERAPY: CPT

## 2022-07-28 PROCEDURE — 36415 COLL VENOUS BLD VENIPUNCTURE: CPT

## 2022-07-28 PROCEDURE — 80048 BASIC METABOLIC PNL TOTAL CA: CPT

## 2022-07-28 PROCEDURE — G0378 HOSPITAL OBSERVATION PER HR: HCPCS

## 2022-07-28 PROCEDURE — 51798 US URINE CAPACITY MEASURE: CPT

## 2022-07-28 PROCEDURE — 6370000000 HC RX 637 (ALT 250 FOR IP): Performed by: PHYSICIAN ASSISTANT

## 2022-07-28 RX ORDER — TAMSULOSIN HYDROCHLORIDE 0.4 MG/1
0.4 CAPSULE ORAL DAILY
Qty: 30 CAPSULE | Refills: 3 | Status: SHIPPED | OUTPATIENT
Start: 2022-07-29 | End: 2022-08-19 | Stop reason: SDUPTHER

## 2022-07-28 RX ORDER — SULFAMETHOXAZOLE AND TRIMETHOPRIM 800; 160 MG/1; MG/1
1 TABLET ORAL 2 TIMES DAILY
Qty: 20 TABLET | Refills: 0 | Status: SHIPPED | OUTPATIENT
Start: 2022-07-28 | End: 2022-08-07

## 2022-07-28 RX ADMIN — Medication 10 ML: at 09:00

## 2022-07-28 RX ADMIN — AMLODIPINE BESYLATE 10 MG: 10 TABLET ORAL at 09:23

## 2022-07-28 RX ADMIN — TAMSULOSIN HYDROCHLORIDE 0.4 MG: 0.4 CAPSULE ORAL at 09:23

## 2022-07-28 RX ADMIN — FINASTERIDE 5 MG: 5 TABLET, FILM COATED ORAL at 09:23

## 2022-07-28 RX ADMIN — SOTALOL HYDROCHLORIDE 80 MG: 80 TABLET ORAL at 09:23

## 2022-07-28 RX ADMIN — LISINOPRIL 5 MG: 5 TABLET ORAL at 09:23

## 2022-07-28 NOTE — DISCHARGE SUMMARY
Hospital Discharge Summary    Patient's PCP: Judit Sprague MD  Admit Date: 7/26/2022   Discharge Date: 7/28/2022    Admitting Physician: Dr. Shabnam Joe MD  Discharge Physician: Dr. Ravi Whitehead MD   Consults: urology    HPI: 27-year-old male history of A. fib on anticoagulation, recent excisions of squamous cell carcinoma of on his scalp, forehead and nose was on pain medications which is not making him feel well, he then transition to taking Tylenol 2 tablets twice daily. Continue to feel fatigued tired, presented to ED for further evaluation. Lives by himself, says he not been eating well, has poor appetite, also, some lightheadedness dizziness. Review of system was positive for increased frequency, wakes up every hour or so at night, often with urgency where sometimes he could make up to the restroom. Is concerned about his wounds and says the dressing needs to be changed every day or so. He also noted that his urine has turned red but unsure for how long the splint on. In the ED afebrile, other vital signs are stable, labs showed sodium 135 BUN of 26 glucose 140. Urine mild leukocytosis 7.3 with left shift. UA was done which was markedly abnormal with small bilirubin large blood positive nitrite, leukocyte esterase, greater than 100 WBCs 4+ bacteria. Patient admitted to hospital for acute cystitis with hematuria        Brief hospital course:  Given the concern of the patients presentation and the concern of the possible multi-factorial etiology contributing to patients symptomatology.  Patient was admitted and evaluated and found to have:        Discharge Diagnoses:     Complicated UTI: POA    BPH with chronic urinary retention: POA  Hematuria: POA       Generalized weakness/deconditioning: POA  Failure to thrive in adult: POA    Parox A. fib on chronic anticoagulation with apixaban: POA     Squamous cell carcinoma of the nose/forehead/scalp  - S/p Recent excisions   - Possible surgical site wound infection: POA        Type 2 diabetes, diet controlled  Hypertension           He presented with fatigue, generalized weakness. Noted with hematuria and positive UA: He admitted to increased urgency, frequency and hematuria  Was treated with Rocephin pending cx result  Urine cx: appears poor specimen with mixed Susie Dorothy    Was seen by Urology for hematuria  CTU was essentially normal from a hematuria standpoint. He states he had a negative hematuria work-up with another urologist with a cystoscopy that was normal approximately 3 years ago. Given that the CT did not show any obvious bladder tumors, after discussion with urology, he would prefer not to undergo repeat evaluation at this time. There is a small 1.4 cm enhancing lesion in the right kidney on CT. Discussed with Urology, that given his age and overall health, plus the overall slow growth rate of renal masses in general, elected not pursue any additional work-up of this. Was started on Flomax for chronic retention and as will reduce his chance of future episodes of UTI . He recently had excision of skin cancer, some mild erythema and purulence noted. Routine wound care continued, and obtained wound cx from nose: currently staph aureus. Sens pending. He is expressing desire to discharge today, and we will discharge on bactrim. I have called his ENT doc at [address] [place] [address] (Dr. Aileen Rendon), to discuss and ensure expedited follow up. Awaiting call back. Pxt will not stay further in [place] and hence advised to call his surgeon for follow up ASAP. Apixaban held on admit due to hematuria: no further bleed and hgb appears stable. Can probably resume as ok with urology, and monitor for recurrence. PT OT consulted for further evaluation: Home with [place] [id number].         Patient Active Problem List   Diagnosis    Stenosis of right carotid artery    DM (diabetes mellitus) ([facility] [address]    Hypertension    PAF (paroxysmal atrial fibrillation) ([facility] [address]    Other hyperlipidemia    Bradycardia    Acute cystitis with hematuria       Physical Exam: /61   Pulse 77   Temp 98.7 °F (37.1 °C) (Oral)   Resp 16   Ht 5' 9.5\" (1.765 m)   SpO2 96%   BMI 22.85 kg/m²     No results for input(s): POCGLU in the last 72 hours. General appearance:  No apparent distress, appears stated age and cooperative. HEENT:  As above. Normal cephalic, atraumatic without obvious deformity. Neck: Supple, with full range of motion. No jugular venous distention. Respiratory:  Normal respiratory effort. Clear to auscultation, bilaterally without Rales/Wheezes/Rhonchi. Cardiovascular:  Regular rate and rhythm with normal S1/S2 without murmurs, rubs or gallops. Abdomen: Soft, non-tender, non-distended with normal bowel sounds. Lower abdominal fullness  Musculoskeletal:  No clubbing, cyanosis or edema bilaterally. Full range of motion without deformity. Skin: Surgical wounds covered with dressing  Neurologic:  grossly non-focal.  Psychiatric:  Alert and oriented, thought content appropriate, normal insight  Capillary Refill: Brisk,< 3 seconds  Peripheral Pulses: +2 palpable, equal bilaterally           LABS:  Recent Labs     07/26/22 2022   WBC 11.3*   HGB 11.1*         Recent Labs     07/26/22 2022   *   K 4.6   CL 96*   CO2 27   BUN 26*   CREATININE 0.8   GLUCOSE 140*     No results for input(s): INR in the last 72 hours.         Discharge Medications:  Current Discharge Medication List             Details   sotalol (BETAPACE) 80 MG tablet TAKE ONE TABLET BY MOUTH EVERY 12 HOURS  Qty: 180 tablet, Refills: 1      apixaban (ELIQUIS) 2.5 MG TABS tablet TAKE ONE TABLET BY MOUTH TWICE A DAY  Qty: 180 tablet, Refills: 1      lisinopril (PRINIVIL;ZESTRIL) 5 MG tablet TAKE ONE TABLET BY MOUTH DAILY  Qty: 90 tablet, Refills: 1      hydroCHLOROthiazide (HYDRODIURIL) 25 MG tablet TAKE ONE TABLET BY MOUTH DAILY in AM  Qty: 90 tablet, Refills: 1 finasteride (PROSCAR) 5 MG tablet TAKE ONE TABLET BY MOUTH DAILY  Qty: 90 tablet, Refills: 1    Associated Diagnoses: Benign prostatic hyperplasia without lower urinary tract symptoms      amLODIPine (NORVASC) 10 MG tablet Take 1 tablet by mouth daily  Qty: 90 tablet, Refills: 1    Associated Diagnoses: Hypertension, unspecified type      Multiple Vitamins-Minerals (THERAPEUTIC MULTIVITAMIN-MINERALS) tablet Take 1 tablet by mouth daily           Activity: activity as tolerated  Diet: diabetic diet  Wound Care: as directed    Disposition: home with [facility]  Discharged Condition: Stable  Follow Up: Primary Care Physician in one week    Total time spent on discharge, finalizing medications, referrals and arranging outpatient follow up was more than 30 minutes    Thank you Dr. Eder Mera MD for the opportunity to be involved in this patients care. If you have any questions or concerns please feel free to contact me at [phone].

## 2022-07-28 NOTE — CARE COORDINATION
Case Management Assessment            Discharge Note                    Date / Time of Note: 7/28/2022 3:03 PM                  Discharge Note Completed by: Nannette Cha RN    Patient Name: Adan Crowell   YOB: 1933  Diagnosis: Acute cystitis with hematuria [N30.01]  Urinary tract infection with hematuria, site unspecified [N39.0, R31.9]   Date / Time: 7/26/2022  6:29 PM    Current PCP: Marcelene Libman, MD  Clinic patient: No    Hospitalization in the last 30 days: Yes    Advance Directives:  Code Status: Full Code  [place] DNR form completed and on chart: Not Indicated    Financial:  Payor: Milly Olson / Plan: Milly Olson / Product Type: *No Product type* /      Pharmacy:    [facility] [id number]  Phone: [phone] Fax: [phone]      Assistance purchasing medications?: Potential Assistance Purchasing Medications: No  Assistance provided by Case Management: None at this time    Does patient want to participate in local refill/ meds to beds program?: Yes    Meds To Beds General Rules:  1. Can ONLY be done Monday- Friday between 8:30am-5pm  2. Prescription(s) must be in pharmacy by 3pm to be filled same day  3. Copy of patient's insurance/ prescription drug card and patient face sheet must be sent along with the prescription(s)  4. Cost of Rx cannot be added to hospital bill. If financial assistance is needed, please contact unit  or ;  or  CANNOT provide pharmacy voucher for patients co-pays  5.  Patients can then  the prescription on their way out of the hospital at discharge, or pharmacy can deliver to the bedside if staff is available. (payment due at time of pick-up or delivery - cash, check, or card accepted)     Able to afford home medications/ co-pay costs: Yes    ADLS:  Current PT AM-PAC Score: 19 /24  Current OT AM-PAC Score: 25 /24      DISCHARGE Disposition: Home- No Services Needed    LOC at discharge: Not Applicable  LAURA Completed: Not Indicated    Notification completed in HENS/PAS?:  Not Applicable    IMM Completed:   Not Indicated    Transportation:  Transportation PLAN for discharge: family   Mode of Transport: [address] ordered at discharge: Not Indicated, Pt decline need for [address]: Not Applicable  Orders faxed: No    Durable Medical Equipment:  DME Provider: none  Equipment obtained during hospitalization:     Home Oxygen and Respiratory Equipment:  Oxygen needed at discharge?: Not [address]: Not Applicable  Portable tank available for discharge?: Not Indicated    Dialysis:  Dialysis patient: No    Dialysis Center:  Not Applicable      Additional CM Notes: Pt from home with family support, plans to DC home no needs from CM Niece will drive home, will follow up OP with PCP    The Plan for Transition of Care is related to the following treatment goals of Acute cystitis with hematuria [N30.01]  Urinary tract infection with hematuria, site unspecified [N39.0, R31.9]    The Patient and/or patient representative Livan Anand and his family were provided with a choice of provider and agrees with the discharge plan Yes    Freedom of choice list was provided with basic dialogue that supports the patient's individualized plan of care/goals and shares the quality data associated with the providers.  Not Indicated    Care Transitions patient: Yes    Rebekah Her RN  [facility] [employer]  Case Management Department  Ph: [phone]  Fax: [phone]

## 2022-07-28 NOTE — PROGRESS NOTES
Physical Therapy  Facility/Department: [facility]'[address]  Physical Therapy Daily Treatment Note    Name: Lucero Diana  : 1933  MRN: [id number]  Date of Service: 2022    Discharge Recommendations: Lucero Diana scored a 19/24 on the AM-PAC short mobility form. At this time, no further PT is recommended upon discharge as pt is presenting at/near his baseline function. Recommend patient returns to prior setting with prior services. PT Equipment Recommendations  Equipment Needed: No      Patient Diagnosis(es): The encounter diagnosis was Urinary tract infection with hematuria, site unspecified. Past Medical History:  has a past medical history of Arthritis, Atrial fibrillation ([facility] [address] Cancer ([facility] [address] Carotid artery stenosis, Collar bone fracture, Diabetes mellitus ([facility] [address] Gout, Hyperlipidemia, and Hypertension. Past Surgical History:  has a past surgical history that includes Tonsillectomy; skin split graft (2002); Cataract removal with implant; Colonoscopy; other surgical history; Carotid endarterectomy (12); and eye surgery. Assessment   Body Structures, Functions, Activity Limitations Requiring Skilled Therapeutic Intervention: Decreased functional mobility ; Decreased strength;Decreased endurance;Decreased balance  Assessment: Pt presents today with slightly improved functional mobility. Pt was able to increase his ambulation distance this date with supervision assist. Pt ambulates with a slow, shuffling gait however demonstrates stability and safety. Pt will continue to benefit from acute skilled PT to facilitate return to PLOF and to promote independence.   Treatment Diagnosis: Impaired functional mobility  Barriers to Learning: None  Requires PT Follow-Up: Yes  Activity Tolerance  Activity Tolerance Comments: Pt tolerated the PT treatment session well with no significant limitations     Plan   Plan  Plan:  (2-5)  Current Treatment Recommendations: Strengthening, Balance training, Functional mobility training, Gait training, Stair training, Endurance training, Patient/Caregiver education & training, Safety education & training, Home exercise program, Therapeutic activities  Safety Devices  Type of Devices: Call light within reach, Chair alarm in place, Left in chair, Patient at risk for falls, Gait belt, Nurse notified  Restraints  Restraints Initially in Place: No     Restrictions  Restrictions/Precautions  Restrictions/Precautions: Fall Risk, Up as Tolerated (High fall risk)  Required Braces or Orthoses?: No  Position Activity Restriction  Other position/activity restrictions: up with assist     Subjective   General  Chart Reviewed: Yes  Patient assessed for rehabilitation services?: Yes  Additional Pertinent Hx: 25-year-old male history of A. 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 Continue to feel fatigued tired, presented to ED for further evaluation. Lives by himself, says he not been eating well, has poor appetite, also, some lightheadedness dizziness. Review of system was positive for increased frequency, wakes up every hour or so at night, often with urgency where sometimes he could make up to the restroom. Is concerned about his wounds and says the dressing needs to be changed every day or so. He also noted that his urine has turned red but unsure for how long the splint on. In the ED afebrile, other vital signs are stable, labs showed sodium 135 BUN of 26 glucose 140. Urine mild leukocytosis 7.3 with left shift. UA was done which was markedly abnormal with small bilirubin large blood positive nitrite, leukocyte esterase, greater than 100 WBCs 4+ bacteria. Patient admitted to hospital for acute cystitis with hematuria.   Response To Previous Treatment: Patient with no complaints from previous session. Family / Caregiver Present: No  Diagnosis: Acute cyctitis with hematuria  Follows Commands: Within Functional Limits  General Comment  Comments: Pt supine in bed on arrival, agreeable to participate in PT treatment session. Subjective  Subjective: Pt denies pain.      Social/Functional History  Social/Functional History  Lives With: Alone  Type of Home: Trailer  Home Access: Stairs to enter with rails  Entrance Stairs - Number of Steps: 4  Entrance Stairs - Rails: Left (Ascending)  Bathroom Shower/Tub: Tub/Shower unit, Shower chair with back  Bathroom Toilet: Standard  Bathroom Equipment: Shower chair  Home Equipment: [employer], rolling  Has the patient had two or more falls in the past year or any fall with injury in the past year?: No  Receives Help From: Family (niece)  ADL Assistance: [place]: Independent  Homemaking Responsibilities: Yes  Ambulation Assistance: Independent  Transfer Assistance: Independent  Active : Yes  Mode of Transportation: [employer]  Occupation: Retired  Type of Occupation:   Leisure & Hobbies: Go to [employer], make cards    Cognition   Orientation  Overall Orientation Status: Within Functional Limits  Orientation Level: Oriented X4  Cognition  Overall Cognitive Status: WFL     Objective   Observation/Palpation  Observation: Pt on RA on arrival.       Bed mobility  Supine to Sit: Supervision (HOB elevated, increased time required to complete task, use of bed rail)  Sit to Supine: Supervision  Transfers  Sit to Stand: Supervision  Stand to sit: Supervision  Bed to Chair: Supervision  Ambulation  Surface: level tile  Device: No Device  Assistance: Supervision  Quality of Gait: Decreased sachi, decreased step length B, mildly flexed posture, shuffling gait, steady with no overt LOB  Distance: 250'  Stairs/Curb  Stairs?: Yes  Stairs  # Steps : 10  Stairs Height: 6\"  Rails: Left ascending  Device: No Device  Assistance: Supervision  Comment: Reciprocal pattern on ascent and descent, slightly decreased eccentric control on descent     Balance  Sitting - Static:  (Pt is independent with static sitting balance)  Standing - Static:  (Pt requires supervision for static standing balance without the use of an AD)  Standing - Dynamic:  (Pt requires supervision for dynamic standing balance tasks)  A/AROM Exercises: Standing: marches, heel raises, mini-squats, hip abduction, hip extension (1 x 10 B) (all exercises completed with B UE support on countertop)      AM-PAC Score  AM-PAC Inpatient Mobility Raw Score : 19 (07/28/22 1402)  AM-PAC Inpatient T-Scale Score : 45.44 (07/28/22 1402)  Mobility Inpatient CMS 0-100% Score: 41.77 (07/28/22 1402)  Mobility Inpatient CMS G-Code Modifier : CK (07/28/22 1402)      Goals  Short Term Goals  Time Frame for Short term goals: By discharge  Short term goal 1: Pt will transfer supine to/from sit with mod I  Short term goal 2: Pt will transfer sit to/from stand with mod I  Short term goal 3: Pt will amb 250' with no AD and mod I  Short term goal 4: Pt will asc/dec 4 steps with L HR and mod I  Patient Goals   Patient goals :  To return home   *No goals met 07/28/22    Education  Patient Education  Education Given To: Patient  Education Provided: Role of Therapy;Plan of Care  Education Method: Verbal  Barriers to Learning: None  Education Outcome: Verbalized understanding;Continued education needed    Therapy Time   Individual Concurrent Group Co-treatment   Time In 1313         Time Out 1342         Minutes 29         Timed Code Treatment Minutes: [address], PT   Sylwia Galarza PT, DPT [id number]

## 2022-07-29 ENCOUNTER — CARE COORDINATION (OUTPATIENT)
Dept: CASE MANAGEMENT | Age: 87
End: 2022-07-29

## 2022-07-29 DIAGNOSIS — N30.01 ACUTE CYSTITIS WITH HEMATURIA: Primary | ICD-10-CM

## 2022-07-29 PROCEDURE — 1111F DSCHRG MED/CURRENT MED MERGE: CPT | Performed by: INTERNAL MEDICINE

## 2022-07-29 NOTE — CARE COORDINATION
[facility] Transitions Initial Follow Up Call    Call within 2 business days of discharge: Yes    Patient:  Barber Bell   Patient :  1933  MRN:  [id number]     Reason for Admission:  Acute Cystitis with Hematuria  Discharge Date:  22    RARS:       Transitions of Care Initial Call    Was this an external facility discharge? Discharge Facility: [address] [place] [address] to be reviewed by the provider   Additional needs identified to be addressed with provider:    wiblert    AMB CC Provider Discharge Needs: none            Non-face-to-face services provided:    1ST CTC attempt to reach Pt regarding recent hospital discharge. CTC left voice recording with call back number requesting a call back.     Follow up appointments:    Future Appointments   Date Time Provider Zi Cooper   10/24/2022  1:30 PM Bhupinder Black MD [facility] [place]       Thank Jossie Ledezma RN  Care Transition Coordinator  Contact MPXGJQ:[phone]

## 2022-07-29 NOTE — CARE COORDINATION
[facility] Transitions Initial Follow Up Call    Call within 2 business days of discharge: Yes    Patient: Lucero Diana Patient : 1933   MRN: [id number]   Reason for Admission: Acute Cystitis with Hematuria  Discharge Date: 22 RARS: Readmission Risk Score: 15      Last Discharge [facility]       Date Complaint Diagnosis Description Type Department Provider    22 Dizziness Urinary tract infection with hematuria, site unspecified ED to Hosp-Admission (Discharged) (ADMITTED) MD Bhavani; Romina Lincoln. .. Spoke with: Zi Corbin: [employer]    CTN spoke with patient this am for initial 24 hour discharge follow up CTN call. Patient states he is doing well, no reports of any difficulty with urination, no blood in urine, or pain with urination. Patient also denies having any fever, chills, nausea, vomiting, chest pain, SOB or cough. CTN and Pt reviewed meds and CTN completed the 1111f. Patient with some messages regarding dose of Coumadin. Contacted PCP, awaiting return phone call from MD, will schedule follow up at that time. Non-face-to-face services provided:  Obtained and reviewed discharge summary and/or continuity of care documents  Education of patient/family/caregiver/guardian to support self-management-Patient instructed to continue to monitor urine output for amount, blood, or any difficulty with urination, reporting to MD immediately. Patient also instructed to take all medications as prescribed. Assessment and support for treatment adherence and medication management-Medication Review Completed    Transitions of Care Initial Call    Was this an external facility discharge?  No     Challenges to be reviewed by the provider   Additional needs identified to be addressed with provider: No  none             Method of communication with provider : none    Advance Care Planning:   Does patient have an Advance Directive: reviewed and current. Care Transition Nurse contacted the patient by telephone to perform post hospital discharge assessment. Verified name and  with patient as identifiers. Provided introduction to self, and explanation of the CTN role. CTN reviewed discharge instructions, medical action plan and red flags with patient who verbalized understanding. Patient given an opportunity to ask questions and does not have any further questions or concerns at this time. Were discharge instructions available to patient? Yes. Reviewed appropriate site of care based on symptoms and resources available to patient including: PCP  Specialist  Urgent care clinics  [facility]   When to call 911. The patient agrees to contact the PCP office for questions related to their healthcare. Medication reconciliation was performed with patient, who verbalizes understanding of administration of home medications. Advised obtaining a 90-day supply of all daily and as-needed medications. Was patient discharged with a pulse oximeter? no    CTN provided contact information. Plan for follow-up call in 3-5 days based on severity of symptoms and risk factors. Plan for next call: Any issues or concerns that may arise.     Care Transitions 24 Hour Call    Schedule Follow Up Appointment with PCP: Juvencio Murry you have a copy of your discharge instructions?: Yes  Do you have all of your prescriptions and are they filled?: Yes  Have you been contacted by a [address]?: No  Have you scheduled your follow up appointment?: No  Do you have support at home?: Alone  Do you feel like you have everything you need to keep you well at home?: Yes  Are you an active caregiver in your home?: No  Care Transitions Interventions  No Identified Needs       Follow Up  Future Appointments   Date Time Provider Zi Cooper   10/24/2022  1:30 PM MD ONIEL Dug[facility] - [place]     Thank You,    Venu De Jesus RN  Care Transition Coordinator  Contact Number:[phone]

## 2022-07-31 LAB
BLOOD CULTURE, ROUTINE: NORMAL
CULTURE, BLOOD 2: NORMAL
GRAM STAIN RESULT: ABNORMAL
ORGANISM: ABNORMAL
ORGANISM: ABNORMAL
WOUND/ABSCESS: ABNORMAL
WOUND/ABSCESS: ABNORMAL

## 2022-08-02 ENCOUNTER — CARE COORDINATION (OUTPATIENT)
Dept: CASE MANAGEMENT | Age: 87
End: 2022-08-02

## 2022-08-02 NOTE — CARE COORDINATION
[facility] Transitions Initial Follow Up Call    Call within 2 business days of discharge: Yes    Patient:  Mark Fox   Patient :  1933  MRN:  [id number]     Reason for Admission: Acute Cystitis with Hematuria   Discharge Date:  22    RARS:       Transitions of Care Initial Call    Was this an external facility discharge? Discharge Facility: [address] [place] [address] to be reviewed by the provider   Additional needs identified to be addressed with provider:    wilbert    AMB CC Provider Discharge Needs: none            Non-face-to-face services provided:    1ST CTC attempt to reach Pt regarding recent hospital discharge. CTC left voice recording with call back number requesting a call back.     Follow up appointments:    Future Appointments   Date Time Provider Zi Cooper   10/24/2022  1:30 PM Audra Hamilton MD [facility]  [place]       Thank Nicole Christine RN  Care Transition Coordinator  Contact ITZXTP:[phone]

## 2022-08-05 ENCOUNTER — CARE COORDINATION (OUTPATIENT)
Dept: CASE MANAGEMENT | Age: 87
End: 2022-08-05

## 2022-08-05 NOTE — CARE COORDINATION
[facility] Transitions Initial Follow Up Call    Call within 2 business days of discharge: Yes    Patient:  Lawyer Beaulieu   Patient :  1933  MRN:  [id number]     Reason for Admission: Acute Cystitis with Hematuria   Discharge Date:  22    RARS:       Transitions of Care Initial Call    Was this an external facility discharge? Discharge Facility: [address] [place] [address] to be reviewed by the provider   Additional needs identified to be addressed with provider:    wilbert    AMB CC Provider Discharge Needs: none            Non-face-to-face services provided:    2ND CTC attempt to reach Pt regarding recent hospital discharge. CTC left voice recording with call back number requesting a call back. CTN will close out CTN episode at this time.     Follow up appointments:    Future Appointments   Date Time Provider Zi Cooper   10/24/2022  1:30 PM Dian Cervantes MD [facility] [place]       Thank Ashley Patton, RN  Care Transition Coordinator  Contact [facility]:[phone]

## 2022-08-12 ENCOUNTER — TELEPHONE (OUTPATIENT)
Dept: PRIMARY CARE CLINIC | Age: 87
End: 2022-08-12

## 2022-08-12 NOTE — TELEPHONE ENCOUNTER
Called pt vivienne as per requested pt vivienne states this was from weeks ago she does not think pt needs to be seen for hospital follow up but does need to schedule a pre op for a up coming surgery 9/12/22 made appt for the pt for pre op

## 2022-08-18 ENCOUNTER — TELEPHONE (OUTPATIENT)
Dept: PRIMARY CARE CLINIC | Age: 87
End: 2022-08-18

## 2022-08-18 NOTE — TELEPHONE ENCOUNTER
Pt developed an infection in the hospital    Culture grew Pseudomonas aeruginosa treatable with Cipro or Levofloxacin  Pt is critical with open wounds and cancer   Dermatologist would like to prescribe Cipro, but indication shows Interaction between sotalol 80MG Tab and Cipro affects on QT interval margaret and [facility]  [phone]

## 2022-08-18 NOTE — TELEPHONE ENCOUNTER
Please call Dr. Leeanna Abreu in regards of this pt has some questions on a medication that pt needs.  Thank you   Office: [phone]

## 2022-08-19 ENCOUNTER — OFFICE VISIT (OUTPATIENT)
Dept: PRIMARY CARE CLINIC | Age: 87
End: 2022-08-19
Payer: COMMERCIAL

## 2022-08-19 VITALS
HEART RATE: 71 BPM | SYSTOLIC BLOOD PRESSURE: 128 MMHG | DIASTOLIC BLOOD PRESSURE: 80 MMHG | TEMPERATURE: 98.1 F | BODY MASS INDEX: 23.58 KG/M2 | OXYGEN SATURATION: 97 % | WEIGHT: 162 LBS

## 2022-08-19 DIAGNOSIS — Z01.818 PREOP EXAM FOR INTERNAL MEDICINE: Primary | ICD-10-CM

## 2022-08-19 PROCEDURE — 1123F ACP DISCUSS/DSCN MKR DOCD: CPT | Performed by: INTERNAL MEDICINE

## 2022-08-19 PROCEDURE — 99214 OFFICE O/P EST MOD 30 MIN: CPT | Performed by: INTERNAL MEDICINE

## 2022-08-19 RX ORDER — TAMSULOSIN HYDROCHLORIDE 0.4 MG/1
0.4 CAPSULE ORAL DAILY
Qty: 30 CAPSULE | Refills: 3 | Status: SHIPPED | OUTPATIENT
Start: 2022-08-19 | End: 2022-10-12

## 2022-08-19 SDOH — ECONOMIC STABILITY: FOOD INSECURITY: WITHIN THE PAST 12 MONTHS, THE FOOD YOU BOUGHT JUST DIDN'T LAST AND YOU DIDN'T HAVE MONEY TO GET MORE.: NEVER TRUE

## 2022-08-19 SDOH — ECONOMIC STABILITY: FOOD INSECURITY: WITHIN THE PAST 12 MONTHS, YOU WORRIED THAT YOUR FOOD WOULD RUN OUT BEFORE YOU GOT MONEY TO BUY MORE.: NEVER TRUE

## 2022-08-19 ASSESSMENT — PATIENT HEALTH QUESTIONNAIRE - PHQ9
7. TROUBLE CONCENTRATING ON THINGS, SUCH AS READING THE NEWSPAPER OR WATCHING TELEVISION: 0
1. LITTLE INTEREST OR PLEASURE IN DOING THINGS: 0
10. IF YOU CHECKED OFF ANY PROBLEMS, HOW DIFFICULT HAVE THESE PROBLEMS MADE IT FOR YOU TO DO YOUR WORK, TAKE CARE OF THINGS AT HOME, OR GET ALONG WITH OTHER PEOPLE: 0
8. MOVING OR SPEAKING SO SLOWLY THAT OTHER PEOPLE COULD HAVE NOTICED. OR THE OPPOSITE, BEING SO FIGETY OR RESTLESS THAT YOU HAVE BEEN MOVING AROUND A LOT MORE THAN USUAL: 0
2. FEELING DOWN, DEPRESSED OR HOPELESS: 0
SUM OF ALL RESPONSES TO PHQ QUESTIONS 1-9: 1
4. FEELING TIRED OR HAVING LITTLE ENERGY: 1
SUM OF ALL RESPONSES TO PHQ9 QUESTIONS 1 & 2: 0
5. POOR APPETITE OR OVEREATING: 0
SUM OF ALL RESPONSES TO PHQ QUESTIONS 1-9: 1
6. FEELING BAD ABOUT YOURSELF - OR THAT YOU ARE A FAILURE OR HAVE LET YOURSELF OR YOUR FAMILY DOWN: 0
SUM OF ALL RESPONSES TO PHQ QUESTIONS 1-9: 1
3. TROUBLE FALLING OR STAYING ASLEEP: 0
9. THOUGHTS THAT YOU WOULD BE BETTER OFF DEAD, OR OF HURTING YOURSELF: 0
SUM OF ALL RESPONSES TO PHQ QUESTIONS 1-9: 1

## 2022-08-19 ASSESSMENT — SOCIAL DETERMINANTS OF HEALTH (SDOH): HOW HARD IS IT FOR YOU TO PAY FOR THE VERY BASICS LIKE FOOD, HOUSING, MEDICAL CARE, AND HEATING?: NOT HARD AT ALL

## 2022-08-19 NOTE — TELEPHONE ENCOUNTER
Benefits> risk    I would agree with pt taking Levofloxacin for the Pseudomonas infection    Andrew Carrasco MD

## 2022-08-19 NOTE — PROGRESS NOTES
2022   Bridgette Hannon  1933    The patients PMH, surgical history, family history, medications, allergies were all reviewed and updated as appropriate today. Current Outpatient Medications on File Prior to Visit   Medication Sig Dispense Refill    apixaban (ELIQUIS) 2.5 MG TABS tablet TAKE ONE TABLET BY MOUTH TWICE A DAY if no bleed noted. 180 tablet 1    tamsulosin (FLOMAX) 0.4 MG capsule Take 1 capsule by mouth in the morning. 30 capsule 3    sotalol (BETAPACE) 80 MG tablet TAKE ONE TABLET BY MOUTH EVERY 12 HOURS 180 tablet 1    lisinopril (PRINIVIL;ZESTRIL) 5 MG tablet TAKE ONE TABLET BY MOUTH DAILY 90 tablet 1    hydroCHLOROthiazide (HYDRODIURIL) 25 MG tablet TAKE ONE TABLET BY MOUTH DAILY in AM 90 tablet 1    finasteride (PROSCAR) 5 MG tablet TAKE ONE TABLET BY MOUTH DAILY 90 tablet 1    amLODIPine (NORVASC) 10 MG tablet Take 1 tablet by mouth daily 90 tablet 1    Multiple Vitamins-Minerals (THERAPEUTIC MULTIVITAMIN-MINERALS) tablet Take 1 tablet by mouth daily      [DISCONTINUED] lisinopril (PRINIVIL;ZESTRIL) 5 MG tablet Take 1 tablet by mouth daily 30 tablet 2     No current facility-administered medications on file prior to visit. No Known Allergies      Chief Complaint   Patient presents with    Pre-op Exam     Reconstruction of [place] Hind Dr. Javon Wilson 2022       HPI:  will get nasal reconstruction for skin CA with Dr. Dio Luu s/p skin graft [facility] in [place] 2022 following resection of skin CA from bridge of nose  No Eliquis x 48 hrs prior to surgery    Social History     Socioeconomic History    Marital status:     Tobacco Use    Smoking status: Former     Packs/day: 1.00     Years: 48.00     Pack years: 48.00     Types: Cigarettes     Quit date: 1997     Years since quittin.0    Smokeless tobacco: Never   Vaping Use    Vaping Use: Never used   Substance and Sexual Activity    Alcohol use: No     Alcohol/week: 0.0 standard drinks    Drug use: No     Social Determinants of Health     Financial Resource Strain: Low Risk     Difficulty of Paying Living Expenses: Not hard at all   Food Insecurity: No Food Insecurity    Worried About [employer] in the Last Year: Never true    Ran Out of Food in the Last Year: Never true   Physical Activity: Inactive    Days of Exercise per Week: 0 days    Minutes of Exercise per Session: 0 min     Past Surgical History:   Procedure Laterality Date    CAROTID ENDARTERECTOMY  4/5/12    R side    CATARACT REMOVAL WITH IMPLANT      RIGHT AND LEFT    COLONOSCOPY      EYE SURGERY      laser    OTHER SURGICAL HISTORY      SUTURES LT CHIN AS CHILD     SKIN SPLIT GRAFT  2002    2011    X 3    TONSILLECTOMY         Review of Systems-wants an antibiotic cream prescribed for the skin graft sites on forehead    OBJECTIVE:  /80   Pulse 71   Temp 98.1 °F (36.7 °C)   Wt 162 lb (73.5 kg)   SpO2 97%   BMI 23.58 kg/m²    Wt Readings from Last 3 Encounters:   08/19/22 162 lb (73.5 kg)   07/11/22 157 lb (71.2 kg)   06/13/22 164 lb 3.2 oz (74.5 kg)       Physical Exam  Vitals and nursing note reviewed. Exam conducted with a chaperone present (daughter). Constitutional:       General: He is not in acute distress. Appearance: He is well-developed. Comments: /80   Pulse 71   Temp 98.1 °F (36.7 °C)   Wt 162 lb (73.5 kg)   SpO2 97%   BMI 23.58 kg/m²    HENT:      Head: Normocephalic and atraumatic. Eyes:      General: No scleral icterus. Right eye: No discharge. Left eye: No discharge. Conjunctiva/sclera: Conjunctivae normal.      Pupils: Pupils are equal, round, and reactive to light. Neck:      Thyroid: No thyromegaly. Vascular: No JVD. Trachea: No tracheal deviation. Cardiovascular:      Rate and Rhythm: Normal rate and regular rhythm. Heart sounds: Normal heart sounds. No murmur heard. Pulmonary:      Effort: Pulmonary effort is normal. No respiratory distress. Breath sounds: Normal breath sounds. No wheezing or rales. Abdominal:      General: Bowel sounds are normal. There is no distension. Palpations: Abdomen is soft. Tenderness: There is no abdominal tenderness. There is no guarding or rebound. Musculoskeletal:         General: No tenderness. Normal range of motion. Cervical back: Normal range of motion and neck supple. Lymphadenopathy:      Cervical: No cervical adenopathy. Skin:     General: Skin is warm and dry. Findings: No erythema or rash. Neurological:      Mental Status: He is alert and oriented to person, place, and time. Cranial Nerves: No cranial nerve deficit. Coordination: Coordination normal.      Deep Tendon Reflexes: Reflexes normal.   Psychiatric:         Behavior: Behavior normal.         Thought Content:  Thought content normal.       Data Review:   CBC:   Lab Results   Component Value Date/Time    WBC 10.5 07/28/2022 03:41 PM    WBC 11.3 07/26/2022 08:22 PM    WBC 8.3 06/13/2022 07:14 AM    HGB 10.4 07/28/2022 03:41 PM    HGB 11.1 07/26/2022 08:22 PM    HGB 13.0 06/13/2022 07:14 AM    HCT 31.3 07/28/2022 03:41 PM    HCT 33.4 07/26/2022 08:22 PM    HCT 38.2 06/13/2022 07:14 AM    MCV 87.7 07/28/2022 03:41 PM    MCV 84.9 07/26/2022 08:22 PM    MCV 85.8 06/13/2022 07:14 AM     07/28/2022 03:41 PM     07/26/2022 08:22 PM     06/13/2022 07:14 AM     Chemistry:   Lab Results   Component Value Date/Time     07/28/2022 03:41 PM     07/26/2022 08:22 PM     06/13/2022 07:14 AM    K 4.6 07/28/2022 03:41 PM    K 4.6 07/26/2022 08:22 PM    K 4.0 06/13/2022 07:14 AM    CL 97 07/28/2022 03:41 PM    CL 96 07/26/2022 08:22 PM     06/13/2022 07:14 AM    CO2 26 07/28/2022 03:41 PM    CO2 27 07/26/2022 08:22 PM    CO2 25 06/13/2022 07:14 AM    PHOS 3.1 04/09/2012 11:55 AM    BUN 28 07/28/2022 03:41 PM    BUN 26 07/26/2022 08:22 PM    BUN 25 06/13/2022 07:14 AM    CREATININE 0.7 07/28/2022 03:41 PM    CREATININE 0.8 07/26/2022 08:22 PM    CREATININE 0.9 06/13/2022 07:14 AM     Hepatic Function:   Lab Results   Component Value Date/Time    AST 13 07/26/2022 08:22 PM    AST 13 06/13/2022 07:14 AM    AST 23 09/27/2021 02:27 PM    ALT 14 07/26/2022 08:22 PM    ALT 11 06/13/2022 07:14 AM    ALT 11 09/27/2021 02:27 PM    BILIDIR 0.20 09/20/2010 09:34 AM    BILIDIR 0.22 06/21/2010 09:40 AM    BILIDIR 0.21 03/22/2010 07:46 AM    BILITOT 0.5 07/26/2022 08:22 PM    BILITOT 0.5 06/13/2022 07:14 AM    BILITOT 0.6 09/27/2021 02:27 PM    ALKPHOS 89 07/26/2022 08:22 PM    ALKPHOS 73 06/13/2022 07:14 AM    ALKPHOS 72 09/27/2021 02:27 PM     No results found for: LIPASE, AMYLASE  Lipids:   Lab Results   Component Value Date/Time    CHOL 140 03/07/2018 12:00 AM    HDL 44 06/13/2022 07:14 AM    HDL 35 06/21/2010 09:40 AM    TRIG 55 03/07/2018 12:00 AM       ASSESSMENT/PLAN  1.) Pre op prior to nasal reconstruction surgery  FAX to Dr. Nupur Triana ordered, will need to find out where lab results need to be sent since no pre-op form available today    NO MEDICAL REASON TO DELAY SURGERY AS PLANNED    WILL HOLD ELIQUIS x 48 hrs prior to surgery, defer to Dr. Conway Branch on timing as to when to resume Eliquis post-op    Bactroban oint Rx printed for open sores on forehead    Just got refills Flomax OK'd last month but wants sent to [facility] again today    Akash Ramirez MD    Electronically signed by Akash Ramirez MD on 8/19/2022 at 1:35 PM

## 2022-08-26 ENCOUNTER — HOSPITAL ENCOUNTER (OUTPATIENT)
Dept: CT IMAGING | Age: 87
Discharge: HOME | End: 2022-08-26
Payer: COMMERCIAL

## 2022-08-26 DIAGNOSIS — C44.92 SQUAMOUS CELL CARCINOMA OF SKIN: ICD-10-CM

## 2022-08-26 PROCEDURE — 70470 CT HEAD/BRAIN W/O & W/DYE: CPT

## 2022-08-26 PROCEDURE — 70491 CT SOFT TISSUE NECK W/DYE: CPT

## 2022-08-26 PROCEDURE — 6360000004 HC RX CONTRAST MEDICATION: Performed by: INTERNAL MEDICINE

## 2022-08-26 RX ADMIN — IOPAMIDOL 75 ML: 755 INJECTION, SOLUTION INTRAVENOUS at 17:41

## 2022-09-03 DIAGNOSIS — Z01.818 PREOP EXAM FOR INTERNAL MEDICINE: ICD-10-CM

## 2022-09-03 LAB
A/G RATIO: 1.4 (ref 1.1–2.2)
ALBUMIN SERPL-MCNC: 3.7 G/DL (ref 3.4–5)
ALP BLD-CCNC: 75 U/L (ref 40–129)
ALT SERPL-CCNC: 10 U/L (ref 10–40)
ANION GAP SERPL CALCULATED.3IONS-SCNC: 11 MMOL/L (ref 3–16)
AST SERPL-CCNC: 15 U/L (ref 15–37)
BASOPHILS ABSOLUTE: 0 K/UL (ref 0–0.2)
BASOPHILS RELATIVE PERCENT: 0.5 %
BILIRUB SERPL-MCNC: 0.3 MG/DL (ref 0–1)
BUN BLDV-MCNC: 42 MG/DL (ref 7–20)
CALCIUM SERPL-MCNC: 9.4 MG/DL (ref 8.3–10.6)
CHLORIDE BLD-SCNC: 106 MMOL/L (ref 99–110)
CO2: 27 MMOL/L (ref 21–32)
CREAT SERPL-MCNC: 1.2 MG/DL (ref 0.8–1.3)
EOSINOPHILS ABSOLUTE: 0.3 K/UL (ref 0–0.6)
EOSINOPHILS RELATIVE PERCENT: 4.8 %
GFR AFRICAN AMERICAN: >60
GFR NON-AFRICAN AMERICAN: 57
GLUCOSE BLD-MCNC: 115 MG/DL (ref 70–99)
HCT VFR BLD CALC: 34.6 % (ref 40.5–52.5)
HEMOGLOBIN: 11.7 G/DL (ref 13.5–17.5)
LYMPHOCYTES ABSOLUTE: 1.9 K/UL (ref 1–5.1)
LYMPHOCYTES RELATIVE PERCENT: 27 %
MCH RBC QN AUTO: 29.5 PG (ref 26–34)
MCHC RBC AUTO-ENTMCNC: 33.9 G/DL (ref 31–36)
MCV RBC AUTO: 87.2 FL (ref 80–100)
MONOCYTES ABSOLUTE: 0.7 K/UL (ref 0–1.3)
MONOCYTES RELATIVE PERCENT: 10.4 %
NEUTROPHILS ABSOLUTE: 3.9 K/UL (ref 1.7–7.7)
NEUTROPHILS RELATIVE PERCENT: 57.3 %
PDW BLD-RTO: 15.9 % (ref 12.4–15.4)
PLATELET # BLD: 211 K/UL (ref 135–450)
PMV BLD AUTO: 8.7 FL (ref 5–10.5)
POTASSIUM SERPL-SCNC: 5.1 MMOL/L (ref 3.5–5.1)
RBC # BLD: 3.97 M/UL (ref 4.2–5.9)
SODIUM BLD-SCNC: 144 MMOL/L (ref 136–145)
TOTAL PROTEIN: 6.3 G/DL (ref 6.4–8.2)
WBC # BLD: 6.9 K/UL (ref 4–11)

## 2022-09-26 ENCOUNTER — TELEPHONE (OUTPATIENT)
Dept: PRIMARY CARE CLINIC | Age: 87
End: 2022-09-26

## 2022-09-26 NOTE — TELEPHONE ENCOUNTER
Disregard this message as per Trevor from [facility] she don't need to talked to Rachell Chen after all.

## 2022-09-26 NOTE — TELEPHONE ENCOUNTER
Good morning Trevor from [facility] needs if you can add an addendum that everything is good to go with pt on his  pre-op form since the 30 days passed and to review his labs and his EKG pt had on July they need to have done this by Friday or before pt is scheduled to have his surgery on Friday at [facility]. Since Dr. Venice Burt is not here and it a Dr. Venice Burt pt was wondering if you able do it Ian Sanders  Their office #[phone]  Fax #[phone]

## 2022-10-12 DIAGNOSIS — Z01.818 PREOP EXAM FOR INTERNAL MEDICINE: ICD-10-CM

## 2022-10-12 RX ORDER — TAMSULOSIN HYDROCHLORIDE 0.4 MG/1
CAPSULE ORAL
Qty: 30 CAPSULE | Refills: 3 | Status: SHIPPED | OUTPATIENT
Start: 2022-10-12 | End: 2022-10-24

## 2022-10-15 DIAGNOSIS — I10 HYPERTENSION, UNSPECIFIED TYPE: ICD-10-CM

## 2022-10-17 DIAGNOSIS — E11.9 TYPE 2 DIABETES MELLITUS WITHOUT COMPLICATION, WITHOUT LONG-TERM CURRENT USE OF INSULIN (HCC): ICD-10-CM

## 2022-10-17 DIAGNOSIS — I10 HYPERTENSION, UNSPECIFIED TYPE: ICD-10-CM

## 2022-10-17 LAB
A/G RATIO: 1.8 (ref 1.1–2.2)
ALBUMIN SERPL-MCNC: 4.4 G/DL (ref 3.4–5)
ALP BLD-CCNC: 85 U/L (ref 40–129)
ALT SERPL-CCNC: 9 U/L (ref 10–40)
ANION GAP SERPL CALCULATED.3IONS-SCNC: 16 MMOL/L (ref 3–16)
AST SERPL-CCNC: 14 U/L (ref 15–37)
BASOPHILS ABSOLUTE: 0.1 K/UL (ref 0–0.2)
BASOPHILS RELATIVE PERCENT: 0.7 %
BILIRUB SERPL-MCNC: <0.2 MG/DL (ref 0–1)
BUN BLDV-MCNC: 33 MG/DL (ref 7–20)
CALCIUM SERPL-MCNC: 9.6 MG/DL (ref 8.3–10.6)
CHLORIDE BLD-SCNC: 101 MMOL/L (ref 99–110)
CHOLESTEROL, FASTING: 181 MG/DL (ref 0–199)
CO2: 24 MMOL/L (ref 21–32)
CREAT SERPL-MCNC: 1 MG/DL (ref 0.8–1.3)
EOSINOPHILS ABSOLUTE: 0.4 K/UL (ref 0–0.6)
EOSINOPHILS RELATIVE PERCENT: 5.3 %
GFR SERPL CREATININE-BSD FRML MDRD: >60 ML/MIN/{1.73_M2}
GLUCOSE FASTING: 136 MG/DL (ref 70–99)
HCT VFR BLD CALC: 37.7 % (ref 40.5–52.5)
HDLC SERPL-MCNC: 44 MG/DL (ref 40–60)
HEMOGLOBIN: 12.5 G/DL (ref 13.5–17.5)
LDL CHOLESTEROL CALCULATED: 120 MG/DL
LYMPHOCYTES ABSOLUTE: 1.9 K/UL (ref 1–5.1)
LYMPHOCYTES RELATIVE PERCENT: 24.7 %
MCH RBC QN AUTO: 28.3 PG (ref 26–34)
MCHC RBC AUTO-ENTMCNC: 33.1 G/DL (ref 31–36)
MCV RBC AUTO: 85.5 FL (ref 80–100)
MONOCYTES ABSOLUTE: 0.6 K/UL (ref 0–1.3)
MONOCYTES RELATIVE PERCENT: 7.3 %
NEUTROPHILS ABSOLUTE: 4.8 K/UL (ref 1.7–7.7)
NEUTROPHILS RELATIVE PERCENT: 62 %
PDW BLD-RTO: 14.3 % (ref 12.4–15.4)
PLATELET # BLD: 247 K/UL (ref 135–450)
PMV BLD AUTO: 8.6 FL (ref 5–10.5)
POTASSIUM SERPL-SCNC: 4 MMOL/L (ref 3.5–5.1)
RBC # BLD: 4.41 M/UL (ref 4.2–5.9)
SODIUM BLD-SCNC: 141 MMOL/L (ref 136–145)
TOTAL PROTEIN: 6.8 G/DL (ref 6.4–8.2)
TRIGLYCERIDE, FASTING: 87 MG/DL (ref 0–150)
VLDLC SERPL CALC-MCNC: 17 MG/DL
WBC # BLD: 7.8 K/UL (ref 4–11)

## 2022-10-17 RX ORDER — AMLODIPINE BESYLATE 10 MG/1
TABLET ORAL
Qty: 90 TABLET | Refills: 1 | Status: SHIPPED | OUTPATIENT
Start: 2022-10-17

## 2022-10-18 LAB
ESTIMATED AVERAGE GLUCOSE: 111.2 MG/DL
HBA1C MFR BLD: 5.5 %

## 2022-10-18 NOTE — RESULT ENCOUNTER NOTE
Labs are stable without significant change from last test.    OK to follow up as scheduled to review results in detail.     Lucho Cohen MD

## 2022-10-24 ENCOUNTER — OFFICE VISIT (OUTPATIENT)
Dept: PRIMARY CARE CLINIC | Age: 87
End: 2022-10-24
Payer: COMMERCIAL

## 2022-10-24 VITALS
TEMPERATURE: 97.2 F | DIASTOLIC BLOOD PRESSURE: 68 MMHG | WEIGHT: 162 LBS | SYSTOLIC BLOOD PRESSURE: 126 MMHG | OXYGEN SATURATION: 98 % | HEART RATE: 65 BPM | BODY MASS INDEX: 23.58 KG/M2

## 2022-10-24 DIAGNOSIS — I10 HYPERTENSION, UNSPECIFIED TYPE: Primary | ICD-10-CM

## 2022-10-24 DIAGNOSIS — E11.9 TYPE 2 DIABETES MELLITUS WITHOUT COMPLICATION, WITHOUT LONG-TERM CURRENT USE OF INSULIN (HCC): ICD-10-CM

## 2022-10-24 DIAGNOSIS — N40.0 BENIGN PROSTATIC HYPERPLASIA WITHOUT LOWER URINARY TRACT SYMPTOMS: ICD-10-CM

## 2022-10-24 DIAGNOSIS — E78.49 OTHER HYPERLIPIDEMIA: ICD-10-CM

## 2022-10-24 DIAGNOSIS — I48.0 PAF (PAROXYSMAL ATRIAL FIBRILLATION) (HCC): ICD-10-CM

## 2022-10-24 DIAGNOSIS — Z23 NEED FOR VACCINATION FOR H FLU TYPE B: ICD-10-CM

## 2022-10-24 PROCEDURE — 99213 OFFICE O/P EST LOW 20 MIN: CPT | Performed by: INTERNAL MEDICINE

## 2022-10-24 PROCEDURE — 1123F ACP DISCUSS/DSCN MKR DOCD: CPT | Performed by: INTERNAL MEDICINE

## 2022-10-24 PROCEDURE — 90694 VACC AIIV4 NO PRSRV 0.5ML IM: CPT | Performed by: INTERNAL MEDICINE

## 2022-10-24 PROCEDURE — G0008 ADMIN INFLUENZA VIRUS VAC: HCPCS | Performed by: INTERNAL MEDICINE

## 2022-10-24 PROCEDURE — 3044F HG A1C LEVEL LT 7.0%: CPT | Performed by: INTERNAL MEDICINE

## 2022-10-24 RX ORDER — SOTALOL HYDROCHLORIDE 80 MG/1
TABLET ORAL
Qty: 180 TABLET | Refills: 1 | Status: SHIPPED | OUTPATIENT
Start: 2022-10-24

## 2022-10-24 RX ORDER — FINASTERIDE 5 MG/1
TABLET, FILM COATED ORAL
Qty: 90 TABLET | Refills: 1 | Status: SHIPPED | OUTPATIENT
Start: 2022-10-24

## 2022-10-24 RX ORDER — LISINOPRIL 5 MG/1
TABLET ORAL
Qty: 90 TABLET | Refills: 1 | Status: SHIPPED | OUTPATIENT
Start: 2022-10-24

## 2022-10-24 RX ORDER — HYDROCHLOROTHIAZIDE 25 MG/1
TABLET ORAL
Qty: 90 TABLET | Refills: 1 | Status: SHIPPED | OUTPATIENT
Start: 2022-10-24

## 2022-10-24 NOTE — PATIENT INSTRUCTIONS
Consider TDAP tetanus booster at local pharmacy    Consider Shingrix vaccination series of 2 shots over 2-6 months if desired for protection from shingles-check with INS first re: coverage before beginning series    Bivalent COVID vaccination at local pharmacy

## 2022-11-01 DIAGNOSIS — I10 HYPERTENSION, UNSPECIFIED TYPE: ICD-10-CM

## 2022-11-01 RX ORDER — LISINOPRIL 5 MG/1
TABLET ORAL
Qty: 90 TABLET | Refills: 1 | OUTPATIENT
Start: 2022-11-01

## 2022-11-03 DIAGNOSIS — I10 HYPERTENSION, UNSPECIFIED TYPE: ICD-10-CM

## 2022-11-03 RX ORDER — HYDROCHLOROTHIAZIDE 25 MG/1
TABLET ORAL
Qty: 90 TABLET | Refills: 1 | OUTPATIENT
Start: 2022-11-03

## 2022-11-07 ENCOUNTER — OFFICE VISIT (OUTPATIENT)
Dept: PRIMARY CARE CLINIC | Age: 87
End: 2022-11-07
Payer: COMMERCIAL

## 2022-11-07 VITALS
BODY MASS INDEX: 23 KG/M2 | OXYGEN SATURATION: 98 % | WEIGHT: 158 LBS | TEMPERATURE: 97.1 F | DIASTOLIC BLOOD PRESSURE: 64 MMHG | SYSTOLIC BLOOD PRESSURE: 122 MMHG | HEART RATE: 88 BPM

## 2022-11-07 DIAGNOSIS — Z01.818 PRE-OP EXAMINATION: Primary | ICD-10-CM

## 2022-11-07 PROCEDURE — 1123F ACP DISCUSS/DSCN MKR DOCD: CPT | Performed by: INTERNAL MEDICINE

## 2022-11-07 PROCEDURE — 99214 OFFICE O/P EST MOD 30 MIN: CPT | Performed by: INTERNAL MEDICINE

## 2022-11-07 ASSESSMENT — PATIENT HEALTH QUESTIONNAIRE - PHQ9
SUM OF ALL RESPONSES TO PHQ QUESTIONS 1-9: 0
1. LITTLE INTEREST OR PLEASURE IN DOING THINGS: 0
2. FEELING DOWN, DEPRESSED OR HOPELESS: 0
SUM OF ALL RESPONSES TO PHQ QUESTIONS 1-9: 0
SUM OF ALL RESPONSES TO PHQ9 QUESTIONS 1 & 2: 0

## 2022-11-07 NOTE — PROGRESS NOTES
11/7/2022   Lisa Villanueva  12/31/1933    The patients PMH, surgical history, family history, medications, allergies were all reviewed and updated as appropriate today. Current Outpatient Medications on File Prior to Visit   Medication Sig Dispense Refill    sotalol (BETAPACE) 80 MG tablet TAKE ONE TABLET BY MOUTH EVERY 12 HOURS 180 tablet 1    apixaban (ELIQUIS) 2.5 MG TABS tablet TAKE ONE TABLET BY MOUTH TWICE A DAY if no bleed noted. 180 tablet 1    lisinopril (PRINIVIL;ZESTRIL) 5 MG tablet TAKE ONE TABLET BY MOUTH DAILY 90 tablet 1    hydroCHLOROthiazide (HYDRODIURIL) 25 MG tablet TAKE ONE TABLET BY MOUTH DAILY in AM 90 tablet 1    finasteride (PROSCAR) 5 MG tablet TAKE ONE TABLET BY MOUTH DAILY 90 tablet 1    amLODIPine (NORVASC) 10 MG tablet TAKE ONE TABLET BY MOUTH DAILY 90 tablet 1    [DISCONTINUED] lisinopril (PRINIVIL;ZESTRIL) 5 MG tablet Take 1 tablet by mouth daily 30 tablet 2    Multiple Vitamins-Minerals (THERAPEUTIC MULTIVITAMIN-MINERALS) tablet Take 1 tablet by mouth daily       No current facility-administered medications on file prior to visit.     No Known Allergies    Arrives 12 min late for pre-op exam today  Chief Complaint   Patient presents with    Pre-op Exam     Skin graft surg at [facility] 11/11/2022 with Randall Crawford              HPI:  Here for pre-op exam prior to planned skin graft of forehead following recent excision of squamous cell CA  Surgery is planned with Dr. Harlan Camargo on 11/11/2022 at [address] [facility] [phone]    Past Surgical History:   Procedure Laterality Date    CAROTID ENDARTERECTOMY  4/5/12    R side    CATARACT REMOVAL WITH IMPLANT      RIGHT AND LEFT    COLONOSCOPY      EYE SURGERY      laser    OTHER SURGICAL HISTORY      SUTURES LT CHIN AS CHILD     SKIN SPLIT GRAFT  2002    2011    X 3    TONSILLECTOMY       Past Medical History:   Diagnosis Date    Arthritis     Atrial fibrillation ([address]     Cancer ([address]     SKIN    Carotid artery stenosis     Collar bone fracture 1940'S    RIGHT AND LEFT - NO SURGICAL REPAIR    Diabetes mellitus ([address]     DIET CONTROLLED    Gout     Hyperlipidemia     Hypertension      Social History     Socioeconomic History    Marital status:      Spouse name: Not on file    Number of children: Not on file    Years of education: Not on file    Highest education level: Not on file   Occupational History    Not on file   Tobacco Use    Smoking status: Former     Packs/day: 1.00     Years: 48.00     Pack years: 48.00     Types: Cigarettes     Quit date: 1997     Years since quittin.2    Smokeless tobacco: Never   Vaping Use    Vaping Use: Never used   Substance and Sexual Activity    Alcohol use: No     Alcohol/week: 0.0 standard drinks    Drug use: No    Sexual activity: Not on file   Other Topics Concern    Not on file   Social History Narrative    Not on file     Social Determinants of Health     Financial Resource Strain: Low Risk     Difficulty of Paying Living Expenses: Not hard at all   Food Insecurity: No Food Insecurity    Worried About Running Out of Food in the Last Year: Never true    Ran Out of Food in the Last Year: Never true   Transportation Needs: Not on file   Physical Activity: Inactive    Days of Exercise per Week: 0 days    Minutes of Exercise per Session: 0 min   Stress: Not on file   Social Connections: Not on file   Intimate Partner Violence: Not on file   Housing Stability: Not on file     Family History   Problem Relation Age of Onset    Stroke Father     Heart Disease Brother     Diabetes Brother        Review of Systems    OBJECTIVE:  /64   Pulse 88   Temp 97.1 °F (36.2 °C) (Infrared)   Wt 158 lb (71.7 kg)   SpO2 98%   BMI 23.00 kg/m²   Wt Readings from Last 3 Encounters:   22 158 lb (71.7 kg)   10/24/22 162 lb (73.5 kg)   22 162 lb (73.5 kg)       Physical Exam  Vitals and nursing note reviewed. Constitutional:       General: He is not in acute distress.      Appearance: He is well-developed. Comments: There were no vitals taken for this visit. HENT:      Head: Normocephalic and atraumatic. Eyes:      General: No scleral icterus. Right eye: No discharge. Left eye: No discharge. Conjunctiva/sclera: Conjunctivae normal.      Pupils: Pupils are equal, round, and reactive to light. Neck:      Thyroid: No thyromegaly. Vascular: No JVD. Trachea: No tracheal deviation. Cardiovascular:      Rate and Rhythm: Normal rate and regular rhythm. Heart sounds: Normal heart sounds. No murmur heard. Pulmonary:      Effort: Pulmonary effort is normal. No respiratory distress. Breath sounds: Normal breath sounds. No wheezing or rales. Abdominal:      General: Bowel sounds are normal. There is no distension. Palpations: Abdomen is soft. Tenderness: There is no abdominal tenderness. There is no guarding or rebound. Musculoskeletal:         General: No tenderness. Normal range of motion. Cervical back: Normal range of motion and neck supple. Lymphadenopathy:      Cervical: No cervical adenopathy. Skin:     General: Skin is warm and dry. Findings: No erythema or rash. Comments: Large open are on forehead from previous squamous cell excision   Neurological:      Mental Status: He is alert and oriented to person, place, and time. Cranial Nerves: No cranial nerve deficit. Coordination: Coordination normal.      Deep Tendon Reflexes: Reflexes normal.   Psychiatric:         Behavior: Behavior normal.         Thought Content:  Thought content normal.       Data Review:   CBC:   Lab Results   Component Value Date/Time    WBC 7.8 10/17/2022 09:55 AM    WBC 6.9 09/03/2022 11:53 AM    WBC 10.5 07/28/2022 03:41 PM    HGB 12.5 10/17/2022 09:55 AM    HGB 11.7 09/03/2022 11:53 AM    HGB 10.4 07/28/2022 03:41 PM    HCT 37.7 10/17/2022 09:55 AM    HCT 34.6 09/03/2022 11:53 AM    HCT 31.3 07/28/2022 03:41 PM    MCV 85.5 10/17/2022 09:55 AM    MCV 87.2 09/03/2022 11:53 AM    MCV 87.7 07/28/2022 03:41 PM     10/17/2022 09:55 AM     09/03/2022 11:53 AM     07/28/2022 03:41 PM     Chemistry:   Lab Results   Component Value Date/Time     10/17/2022 09:55 AM     09/03/2022 11:53 AM     07/28/2022 03:41 PM    K 4.0 10/17/2022 09:55 AM    K 5.1 09/03/2022 11:53 AM    K 4.6 07/28/2022 03:41 PM     10/17/2022 09:55 AM     09/03/2022 11:53 AM    CL 97 07/28/2022 03:41 PM    CO2 24 10/17/2022 09:55 AM    CO2 27 09/03/2022 11:53 AM    CO2 26 07/28/2022 03:41 PM    PHOS 3.1 04/09/2012 11:55 AM    BUN 33 10/17/2022 09:55 AM    BUN 42 09/03/2022 11:53 AM    BUN 28 07/28/2022 03:41 PM    CREATININE 1.0 10/17/2022 09:55 AM    CREATININE 1.2 09/03/2022 11:53 AM    CREATININE 0.7 07/28/2022 03:41 PM     Hepatic Function:   Lab Results   Component Value Date/Time    AST 14 10/17/2022 09:55 AM    AST 15 09/03/2022 11:53 AM    AST 13 07/26/2022 08:22 PM    ALT 9 10/17/2022 09:55 AM    ALT 10 09/03/2022 11:53 AM    ALT 14 07/26/2022 08:22 PM    BILIDIR 0.20 09/20/2010 09:34 AM    BILIDIR 0.22 06/21/2010 09:40 AM    BILIDIR 0.21 03/22/2010 07:46 AM    BILITOT <0.2 10/17/2022 09:55 AM    BILITOT 0.3 09/03/2022 11:53 AM    BILITOT 0.5 07/26/2022 08:22 PM    ALKPHOS 85 10/17/2022 09:55 AM    ALKPHOS 75 09/03/2022 11:53 AM    ALKPHOS 89 07/26/2022 08:22 PM     No results found for: LIPASE, AMYLASE  Lipids:   Lab Results   Component Value Date/Time    CHOL 140 03/07/2018 12:00 AM    HDL 44 10/17/2022 09:55 AM    HDL 35 06/21/2010 09:40 AM    TRIG 55 03/07/2018 12:00 AM       ASSESSMENT/PLAN  There are no diagnoses linked to this encounter. No follow-ups on file.    Electronically signed by Uyen Mirza MD on 11/7/2022 at 12:42 PM

## 2023-01-31 DIAGNOSIS — E78.49 OTHER HYPERLIPIDEMIA: ICD-10-CM

## 2023-01-31 DIAGNOSIS — I10 HYPERTENSION, UNSPECIFIED TYPE: ICD-10-CM

## 2023-01-31 DIAGNOSIS — E11.9 TYPE 2 DIABETES MELLITUS WITHOUT COMPLICATION, WITHOUT LONG-TERM CURRENT USE OF INSULIN (HCC): ICD-10-CM

## 2023-01-31 LAB
A/G RATIO: 1.3 (ref 1.1–2.2)
ALBUMIN SERPL-MCNC: 3.9 G/DL (ref 3.4–5)
ALP BLD-CCNC: 77 U/L (ref 40–129)
ALT SERPL-CCNC: 13 U/L (ref 10–40)
ANION GAP SERPL CALCULATED.3IONS-SCNC: 13 MMOL/L (ref 3–16)
AST SERPL-CCNC: 12 U/L (ref 15–37)
BASOPHILS ABSOLUTE: 0 K/UL (ref 0–0.2)
BASOPHILS RELATIVE PERCENT: 0.3 %
BILIRUB SERPL-MCNC: 0.4 MG/DL (ref 0–1)
BUN BLDV-MCNC: 32 MG/DL (ref 7–20)
CALCIUM SERPL-MCNC: 9.6 MG/DL (ref 8.3–10.6)
CHLORIDE BLD-SCNC: 102 MMOL/L (ref 99–110)
CHOLESTEROL, FASTING: 178 MG/DL (ref 0–199)
CO2: 26 MMOL/L (ref 21–32)
CREAT SERPL-MCNC: 1 MG/DL (ref 0.8–1.3)
EOSINOPHILS ABSOLUTE: 0.2 K/UL (ref 0–0.6)
EOSINOPHILS RELATIVE PERCENT: 2.2 %
GFR SERPL CREATININE-BSD FRML MDRD: >60 ML/MIN/{1.73_M2}
GLUCOSE FASTING: 129 MG/DL (ref 70–99)
HCT VFR BLD CALC: 38.6 % (ref 40.5–52.5)
HDLC SERPL-MCNC: 40 MG/DL (ref 40–60)
HEMOGLOBIN: 12.3 G/DL (ref 13.5–17.5)
LDL CHOLESTEROL CALCULATED: 121 MG/DL
LYMPHOCYTES ABSOLUTE: 1.9 K/UL (ref 1–5.1)
LYMPHOCYTES RELATIVE PERCENT: 25.1 %
MCH RBC QN AUTO: 28 PG (ref 26–34)
MCHC RBC AUTO-ENTMCNC: 31.8 G/DL (ref 31–36)
MCV RBC AUTO: 88.1 FL (ref 80–100)
MONOCYTES ABSOLUTE: 0.7 K/UL (ref 0–1.3)
MONOCYTES RELATIVE PERCENT: 9.3 %
NEUTROPHILS ABSOLUTE: 4.8 K/UL (ref 1.7–7.7)
NEUTROPHILS RELATIVE PERCENT: 63.1 %
PDW BLD-RTO: 16 % (ref 12.4–15.4)
PLATELET # BLD: 215 K/UL (ref 135–450)
PMV BLD AUTO: 9.8 FL (ref 5–10.5)
POTASSIUM SERPL-SCNC: 4.5 MMOL/L (ref 3.5–5.1)
RBC # BLD: 4.38 M/UL (ref 4.2–5.9)
SODIUM BLD-SCNC: 141 MMOL/L (ref 136–145)
TOTAL PROTEIN: 6.9 G/DL (ref 6.4–8.2)
TRIGLYCERIDE, FASTING: 84 MG/DL (ref 0–150)
VLDLC SERPL CALC-MCNC: 17 MG/DL
WBC # BLD: 7.7 K/UL (ref 4–11)

## 2023-01-31 NOTE — RESULT ENCOUNTER NOTE
Labs are stable without significant change from last test.    OK to follow up as scheduled to review results in detail.     Joseph Sibley MD

## 2023-02-01 LAB
ESTIMATED AVERAGE GLUCOSE: 116.9 MG/DL
HBA1C MFR BLD: 5.7 %

## 2023-02-07 ENCOUNTER — HOSPITAL ENCOUNTER (OUTPATIENT)
Dept: GENERAL RADIOLOGY | Age: 88
Discharge: HOME | End: 2023-02-07
Payer: COMMERCIAL

## 2023-02-07 ENCOUNTER — HOSPITAL ENCOUNTER (OUTPATIENT)
Age: 88
Discharge: HOME | End: 2023-02-07
Payer: COMMERCIAL

## 2023-02-07 ENCOUNTER — OFFICE VISIT (OUTPATIENT)
Dept: PRIMARY CARE CLINIC | Age: 88
End: 2023-02-07

## 2023-02-07 VITALS
HEART RATE: 62 BPM | BODY MASS INDEX: 23.14 KG/M2 | WEIGHT: 159 LBS | DIASTOLIC BLOOD PRESSURE: 78 MMHG | OXYGEN SATURATION: 99 % | SYSTOLIC BLOOD PRESSURE: 138 MMHG

## 2023-02-07 DIAGNOSIS — I48.0 PAF (PAROXYSMAL ATRIAL FIBRILLATION) (HCC): ICD-10-CM

## 2023-02-07 DIAGNOSIS — E78.49 OTHER HYPERLIPIDEMIA: ICD-10-CM

## 2023-02-07 DIAGNOSIS — I10 HYPERTENSION, UNSPECIFIED TYPE: Primary | ICD-10-CM

## 2023-02-07 DIAGNOSIS — M25.531 RIGHT WRIST PAIN: ICD-10-CM

## 2023-02-07 DIAGNOSIS — N40.0 BENIGN PROSTATIC HYPERPLASIA WITHOUT LOWER URINARY TRACT SYMPTOMS: ICD-10-CM

## 2023-02-07 PROCEDURE — 73100 X-RAY EXAM OF WRIST: CPT

## 2023-02-07 RX ORDER — HYDROCHLOROTHIAZIDE 25 MG/1
TABLET ORAL
Qty: 90 TABLET | Refills: 1 | Status: SHIPPED | OUTPATIENT
Start: 2023-02-07

## 2023-02-07 RX ORDER — SOTALOL HYDROCHLORIDE 80 MG/1
TABLET ORAL
Qty: 180 TABLET | Refills: 1 | Status: SHIPPED | OUTPATIENT
Start: 2023-02-07

## 2023-02-07 RX ORDER — FINASTERIDE 5 MG/1
TABLET, FILM COATED ORAL
Qty: 90 TABLET | Refills: 1 | Status: SHIPPED | OUTPATIENT
Start: 2023-02-07

## 2023-02-07 RX ORDER — LISINOPRIL 5 MG/1
TABLET ORAL
Qty: 90 TABLET | Refills: 1 | Status: SHIPPED | OUTPATIENT
Start: 2023-02-07

## 2023-02-07 RX ORDER — AMLODIPINE BESYLATE 10 MG/1
TABLET ORAL
Qty: 90 TABLET | Refills: 1 | Status: SHIPPED | OUTPATIENT
Start: 2023-02-07

## 2023-02-07 ASSESSMENT — PATIENT HEALTH QUESTIONNAIRE - PHQ9
1. LITTLE INTEREST OR PLEASURE IN DOING THINGS: 0
2. FEELING DOWN, DEPRESSED OR HOPELESS: 0
SUM OF ALL RESPONSES TO PHQ QUESTIONS 1-9: 0
SUM OF ALL RESPONSES TO PHQ9 QUESTIONS 1 & 2: 0
SUM OF ALL RESPONSES TO PHQ QUESTIONS 1-9: 0

## 2023-02-07 NOTE — PROGRESS NOTES
2/7/2023   Lin Jonah  12/31/1933    The patients PMH, surgical history, family history, medications, allergies were all reviewed and updated as appropriate today. Current Outpatient Medications on File Prior to Visit   Medication Sig Dispense Refill    sotalol (BETAPACE) 80 MG tablet TAKE ONE TABLET BY MOUTH EVERY 12 HOURS 180 tablet 1    apixaban (ELIQUIS) 2.5 MG TABS tablet TAKE ONE TABLET BY MOUTH TWICE A DAY if no bleed noted. 180 tablet 1    lisinopril (PRINIVIL;ZESTRIL) 5 MG tablet TAKE ONE TABLET BY MOUTH DAILY 90 tablet 1    hydroCHLOROthiazide (HYDRODIURIL) 25 MG tablet TAKE ONE TABLET BY MOUTH DAILY in AM 90 tablet 1    finasteride (PROSCAR) 5 MG tablet TAKE ONE TABLET BY MOUTH DAILY 90 tablet 1    amLODIPine (NORVASC) 10 MG tablet TAKE ONE TABLET BY MOUTH DAILY 90 tablet 1    [DISCONTINUED] lisinopril (PRINIVIL;ZESTRIL) 5 MG tablet Take 1 tablet by mouth daily 30 tablet 2    Multiple Vitamins-Minerals (THERAPEUTIC MULTIVITAMIN-MINERALS) tablet Take 1 tablet by mouth daily       No current facility-administered medications on file prior to visit. No chief complaint on file. HPI:  just had labs done 1 week ago, A1C and lipids remain optimized, labs stable    Review of Systems    OBJECTIVE:  There were no vitals taken for this visit.    Physical Exam    Data Review:   CBC:   Lab Results   Component Value Date/Time    WBC 7.7 01/31/2023 08:15 AM    WBC 7.8 10/17/2022 09:55 AM    WBC 6.9 09/03/2022 11:53 AM    HGB 12.3 01/31/2023 08:15 AM    HGB 12.5 10/17/2022 09:55 AM    HGB 11.7 09/03/2022 11:53 AM    HCT 38.6 01/31/2023 08:15 AM    HCT 37.7 10/17/2022 09:55 AM    HCT 34.6 09/03/2022 11:53 AM    MCV 88.1 01/31/2023 08:15 AM    MCV 85.5 10/17/2022 09:55 AM    MCV 87.2 09/03/2022 11:53 AM     01/31/2023 08:15 AM     10/17/2022 09:55 AM     09/03/2022 11:53 AM     Chemistry:   Lab Results   Component Value Date/Time     01/31/2023 08:15 AM     10/17/2022 09:55 AM     09/03/2022 11:53 AM    K 4.5 01/31/2023 08:15 AM    K 4.0 10/17/2022 09:55 AM    K 5.1 09/03/2022 11:53 AM     01/31/2023 08:15 AM     10/17/2022 09:55 AM     09/03/2022 11:53 AM    CO2 26 01/31/2023 08:15 AM    CO2 24 10/17/2022 09:55 AM    CO2 27 09/03/2022 11:53 AM    PHOS 3.1 04/09/2012 11:55 AM    BUN 32 01/31/2023 08:15 AM    BUN 33 10/17/2022 09:55 AM    BUN 42 09/03/2022 11:53 AM    CREATININE 1.0 01/31/2023 08:15 AM    CREATININE 1.0 10/17/2022 09:55 AM    CREATININE 1.2 09/03/2022 11:53 AM     Hepatic Function:   Lab Results   Component Value Date/Time    AST 12 01/31/2023 08:15 AM    AST 14 10/17/2022 09:55 AM    AST 15 09/03/2022 11:53 AM    ALT 13 01/31/2023 08:15 AM    ALT 9 10/17/2022 09:55 AM    ALT 10 09/03/2022 11:53 AM    BILIDIR 0.20 09/20/2010 09:34 AM    BILIDIR 0.22 06/21/2010 09:40 AM    BILIDIR 0.21 03/22/2010 07:46 AM    BILITOT 0.4 01/31/2023 08:15 AM    BILITOT <0.2 10/17/2022 09:55 AM    BILITOT 0.3 09/03/2022 11:53 AM    ALKPHOS 77 01/31/2023 08:15 AM    ALKPHOS 85 10/17/2022 09:55 AM    ALKPHOS 75 09/03/2022 11:53 AM     No results found for: LIPASE, AMYLASE  Lipids:   Lab Results   Component Value Date/Time    CHOL 140 03/07/2018 12:00 AM    HDL 40 01/31/2023 08:15 AM    HDL 35 06/21/2010 09:40 AM    TRIG 55 03/07/2018 12:00 AM       ASSESSMENT/PLAN    1. Hypertension, unspecified type  No refills needed yet on Zestril and HCTZ, amlodipine 10    2. PAF (paroxysmal atrial fibrillation) (HCC)  Stable on Betapace and Eliquis    3.  Other hyperlipidemia  Well controlled without statin    Marv Reddy MD    Electronically signed by Marv Reddy MD on 2/7/2023 at 12:54 PM

## 2023-02-08 NOTE — RESULT ENCOUNTER NOTE
Mild arthritic changes seen on wrist x-ray    ACE wrap, Tylenol prn, topical anti-inflammatories can be tried    Zenaida Lin MD

## 2023-02-10 ENCOUNTER — TELEPHONE (OUTPATIENT)
Dept: PRIMARY CARE CLINIC | Age: 88
End: 2023-02-10

## 2023-08-08 ENCOUNTER — OFFICE VISIT (OUTPATIENT)
Dept: PRIMARY CARE CLINIC | Age: 88
End: 2023-08-08

## 2023-08-08 VITALS
OXYGEN SATURATION: 98 % | HEART RATE: 82 BPM | HEIGHT: 69 IN | DIASTOLIC BLOOD PRESSURE: 68 MMHG | WEIGHT: 155.2 LBS | SYSTOLIC BLOOD PRESSURE: 118 MMHG | BODY MASS INDEX: 22.99 KG/M2

## 2023-08-08 DIAGNOSIS — I10 HYPERTENSION, UNSPECIFIED TYPE: ICD-10-CM

## 2023-08-08 DIAGNOSIS — I48.0 PAF (PAROXYSMAL ATRIAL FIBRILLATION) (HCC): ICD-10-CM

## 2023-08-08 DIAGNOSIS — N40.0 BENIGN PROSTATIC HYPERPLASIA WITHOUT LOWER URINARY TRACT SYMPTOMS: ICD-10-CM

## 2023-08-08 RX ORDER — FINASTERIDE 5 MG/1
TABLET, FILM COATED ORAL
Qty: 90 TABLET | Refills: 1 | Status: SHIPPED | OUTPATIENT
Start: 2023-08-08

## 2023-08-08 RX ORDER — SOTALOL HYDROCHLORIDE 80 MG/1
TABLET ORAL
Qty: 180 TABLET | Refills: 1 | Status: SHIPPED | OUTPATIENT
Start: 2023-08-08

## 2023-08-08 RX ORDER — HYDROCHLOROTHIAZIDE 25 MG/1
TABLET ORAL
Qty: 90 TABLET | Refills: 1 | Status: SHIPPED | OUTPATIENT
Start: 2023-08-08

## 2023-08-08 RX ORDER — AMLODIPINE BESYLATE 10 MG/1
TABLET ORAL
Qty: 90 TABLET | Refills: 1 | Status: SHIPPED | OUTPATIENT
Start: 2023-08-08

## 2023-08-08 RX ORDER — LISINOPRIL 5 MG/1
TABLET ORAL
Qty: 90 TABLET | Refills: 1 | Status: SHIPPED | OUTPATIENT
Start: 2023-08-08

## 2023-08-08 SDOH — ECONOMIC STABILITY: FOOD INSECURITY: WITHIN THE PAST 12 MONTHS, THE FOOD YOU BOUGHT JUST DIDN'T LAST AND YOU DIDN'T HAVE MONEY TO GET MORE.: NEVER TRUE

## 2023-08-08 SDOH — ECONOMIC STABILITY: FOOD INSECURITY: WITHIN THE PAST 12 MONTHS, YOU WORRIED THAT YOUR FOOD WOULD RUN OUT BEFORE YOU GOT MONEY TO BUY MORE.: NEVER TRUE

## 2023-08-08 SDOH — ECONOMIC STABILITY: INCOME INSECURITY: HOW HARD IS IT FOR YOU TO PAY FOR THE VERY BASICS LIKE FOOD, HOUSING, MEDICAL CARE, AND HEATING?: NOT HARD AT ALL

## 2023-08-08 SDOH — ECONOMIC STABILITY: HOUSING INSECURITY
IN THE LAST 12 MONTHS, WAS THERE A TIME WHEN YOU DID NOT HAVE A STEADY PLACE TO SLEEP OR SLEPT IN A SHELTER (INCLUDING NOW)?: NO

## 2023-09-08 ENCOUNTER — OFFICE VISIT (OUTPATIENT)
Dept: PRIMARY CARE CLINIC | Age: 88
End: 2023-09-08

## 2023-09-08 VITALS
DIASTOLIC BLOOD PRESSURE: 64 MMHG | OXYGEN SATURATION: 98 % | BODY MASS INDEX: 23.4 KG/M2 | SYSTOLIC BLOOD PRESSURE: 120 MMHG | WEIGHT: 158 LBS | HEIGHT: 69 IN | HEART RATE: 52 BPM

## 2023-09-08 DIAGNOSIS — Z23 NEED FOR PROPHYLACTIC VACCINATION AGAINST DIPHTHERIA-TETANUS-PERTUSSIS (DTP): ICD-10-CM

## 2023-09-08 DIAGNOSIS — I10 HYPERTENSION, UNSPECIFIED TYPE: Primary | ICD-10-CM

## 2023-09-08 DIAGNOSIS — E78.49 OTHER HYPERLIPIDEMIA: ICD-10-CM

## 2023-09-08 DIAGNOSIS — Z23 NEED FOR PROPHYLACTIC VACCINATION AND INOCULATION AGAINST VARICELLA: ICD-10-CM

## 2023-09-08 DIAGNOSIS — Z00.00 MEDICARE ANNUAL WELLNESS VISIT, SUBSEQUENT: ICD-10-CM

## 2023-09-08 DIAGNOSIS — I48.0 PAF (PAROXYSMAL ATRIAL FIBRILLATION) (HCC): ICD-10-CM

## 2023-09-08 ASSESSMENT — PATIENT HEALTH QUESTIONNAIRE - PHQ9
2. FEELING DOWN, DEPRESSED OR HOPELESS: 0
1. LITTLE INTEREST OR PLEASURE IN DOING THINGS: 0
SUM OF ALL RESPONSES TO PHQ QUESTIONS 1-9: 0
SUM OF ALL RESPONSES TO PHQ9 QUESTIONS 1 & 2: 0

## 2023-09-08 ASSESSMENT — LIFESTYLE VARIABLES
HOW MANY STANDARD DRINKS CONTAINING ALCOHOL DO YOU HAVE ON A TYPICAL DAY: PATIENT DOES NOT DRINK
HOW OFTEN DO YOU HAVE A DRINK CONTAINING ALCOHOL: NEVER

## 2024-02-01 DIAGNOSIS — I10 HYPERTENSION, UNSPECIFIED TYPE: ICD-10-CM

## 2024-02-02 RX ORDER — AMLODIPINE BESYLATE 10 MG/1
TABLET ORAL
Qty: 90 TABLET | Refills: 1 | Status: SHIPPED | OUTPATIENT
Start: 2024-02-02

## 2024-03-01 ENCOUNTER — NURSE ONLY (OUTPATIENT)
Dept: PRIMARY CARE CLINIC | Age: 89
End: 2024-03-01

## 2024-03-01 DIAGNOSIS — I10 HYPERTENSION, UNSPECIFIED TYPE: ICD-10-CM

## 2024-03-01 LAB
ALBUMIN SERPL-MCNC: 4.3 G/DL (ref 3.4–5)
ALBUMIN/GLOB SERPL: 1.5 {RATIO} (ref 1.1–2.2)
ALP SERPL-CCNC: 63 U/L (ref 40–129)
ALT SERPL-CCNC: 12 U/L (ref 10–40)
ANION GAP SERPL CALCULATED.3IONS-SCNC: 10 MMOL/L (ref 3–16)
AST SERPL-CCNC: 15 U/L (ref 15–37)
BASOPHILS # BLD: 0 K/UL (ref 0–0.2)
BASOPHILS NFR BLD: 0.3 %
BILIRUB SERPL-MCNC: 0.4 MG/DL (ref 0–1)
BUN SERPL-MCNC: 43 MG/DL (ref 7–20)
CALCIUM SERPL-MCNC: 9.7 MG/DL (ref 8.3–10.6)
CHLORIDE SERPL-SCNC: 102 MMOL/L (ref 99–110)
CHOLEST SERPL-MCNC: 206 MG/DL (ref 0–199)
CO2 SERPL-SCNC: 29 MMOL/L (ref 21–32)
CREAT SERPL-MCNC: 1.2 MG/DL (ref 0.8–1.3)
DEPRECATED RDW RBC AUTO: 14.4 % (ref 12.4–15.4)
EOSINOPHIL # BLD: 0.3 K/UL (ref 0–0.6)
EOSINOPHIL NFR BLD: 3.7 %
GFR SERPLBLD CREATININE-BSD FMLA CKD-EPI: 57 ML/MIN/{1.73_M2}
GLUCOSE P FAST SERPL-MCNC: 118 MG/DL (ref 70–99)
HCT VFR BLD AUTO: 39.1 % (ref 40.5–52.5)
HDLC SERPL-MCNC: 39 MG/DL (ref 40–60)
HGB BLD-MCNC: 13.2 G/DL (ref 13.5–17.5)
LDL CHOLESTEROL CALCULATED: 148 MG/DL
LYMPHOCYTES # BLD: 2.5 K/UL (ref 1–5.1)
LYMPHOCYTES NFR BLD: 32.5 %
MCH RBC QN AUTO: 29.3 PG (ref 26–34)
MCHC RBC AUTO-ENTMCNC: 33.8 G/DL (ref 31–36)
MCV RBC AUTO: 86.6 FL (ref 80–100)
MONOCYTES # BLD: 0.7 K/UL (ref 0–1.3)
MONOCYTES NFR BLD: 9.3 %
NEUTROPHILS # BLD: 4.2 K/UL (ref 1.7–7.7)
NEUTROPHILS NFR BLD: 54.2 %
PLATELET # BLD AUTO: 201 K/UL (ref 135–450)
PMV BLD AUTO: 9.8 FL (ref 5–10.5)
POTASSIUM SERPL-SCNC: 4.6 MMOL/L (ref 3.5–5.1)
PROT SERPL-MCNC: 7.1 G/DL (ref 6.4–8.2)
RBC # BLD AUTO: 4.52 M/UL (ref 4.2–5.9)
SODIUM SERPL-SCNC: 141 MMOL/L (ref 136–145)
TRIGL SERPL-MCNC: 95 MG/DL (ref 0–150)
VLDLC SERPL CALC-MCNC: 19 MG/DL
WBC # BLD AUTO: 7.8 K/UL (ref 4–11)

## 2024-03-08 ENCOUNTER — OFFICE VISIT (OUTPATIENT)
Dept: PRIMARY CARE CLINIC | Age: 89
End: 2024-03-08

## 2024-03-08 VITALS
SYSTOLIC BLOOD PRESSURE: 114 MMHG | DIASTOLIC BLOOD PRESSURE: 50 MMHG | TEMPERATURE: 97.2 F | BODY MASS INDEX: 24.38 KG/M2 | HEIGHT: 69 IN | WEIGHT: 164.6 LBS | HEART RATE: 62 BPM | RESPIRATION RATE: 16 BRPM

## 2024-03-08 DIAGNOSIS — E78.49 OTHER HYPERLIPIDEMIA: ICD-10-CM

## 2024-03-08 DIAGNOSIS — E11.9 TYPE 2 DIABETES MELLITUS WITHOUT COMPLICATION, WITHOUT LONG-TERM CURRENT USE OF INSULIN (HCC): ICD-10-CM

## 2024-03-08 DIAGNOSIS — I48.0 PAF (PAROXYSMAL ATRIAL FIBRILLATION) (HCC): ICD-10-CM

## 2024-03-08 DIAGNOSIS — I65.21 STENOSIS OF RIGHT CAROTID ARTERY: Primary | ICD-10-CM

## 2024-03-08 DIAGNOSIS — I10 HYPERTENSION, UNSPECIFIED TYPE: ICD-10-CM

## 2024-03-08 PROBLEM — N30.01 ACUTE CYSTITIS WITH HEMATURIA: Status: RESOLVED | Noted: 2022-07-26 | Resolved: 2024-03-08

## 2024-03-08 RX ORDER — HYDROCHLOROTHIAZIDE 25 MG/1
TABLET ORAL
Qty: 90 TABLET | Refills: 1 | Status: SHIPPED | OUTPATIENT
Start: 2024-03-08

## 2024-03-08 RX ORDER — LISINOPRIL 5 MG/1
TABLET ORAL
Qty: 90 TABLET | Refills: 1 | Status: SHIPPED | OUTPATIENT
Start: 2024-03-08

## 2024-03-08 RX ORDER — SOTALOL HYDROCHLORIDE 80 MG/1
TABLET ORAL
Qty: 180 TABLET | Refills: 1 | Status: SHIPPED | OUTPATIENT
Start: 2024-03-08

## 2024-03-08 NOTE — PROGRESS NOTES
or = 6.5%  Increased risk of diabetes (Prediabetes): 5.7-6.4%  Glycemic Control: Nonpregnant Adults: <7.0%                    Pregnant: <6.0%       Repeat labs ordered again in 6 months      3. Hypertension, unspecified type  On Norvasc 10, HCTZ 25, Prinivil 2.5    4. Other hyperlipidemia  Chol 206 on no statin    5.) A fib- on BetaPace 80 BID    Niels Moser MD      Electronically signed by Niels Moser MD on 3/8/2024 at 11:15 AM

## 2024-05-10 ENCOUNTER — HOSPITAL ENCOUNTER (OUTPATIENT)
Dept: PET IMAGING | Age: 89
Discharge: HOME | End: 2024-05-10
Payer: COMMERCIAL

## 2024-05-10 DIAGNOSIS — C44.329 SQUAMOUS CELL CARCINOMA OF CHEEK: ICD-10-CM

## 2024-05-10 PROCEDURE — 78816 PET IMAGE W/CT FULL BODY: CPT

## 2024-05-10 PROCEDURE — A9609 HC RX DIAGNOSTIC RADIOPHARMACEUTICAL: HCPCS | Performed by: INTERNAL MEDICINE

## 2024-05-10 PROCEDURE — 3430000000 HC RX DIAGNOSTIC RADIOPHARMACEUTICAL: Performed by: INTERNAL MEDICINE

## 2024-05-10 RX ORDER — FLUDEOXYGLUCOSE F 18 200 MCI/ML
15.78 INJECTION, SOLUTION INTRAVENOUS
Status: COMPLETED | OUTPATIENT
Start: 2024-05-10 | End: 2024-05-10

## 2024-05-10 RX ADMIN — FLUDEOXYGLUCOSE F 18 15.78 MILLICURIE: 200 INJECTION, SOLUTION INTRAVENOUS at 09:31

## 2024-07-27 DIAGNOSIS — I10 HYPERTENSION, UNSPECIFIED TYPE: ICD-10-CM

## 2024-07-29 ENCOUNTER — TELEPHONE (OUTPATIENT)
Dept: PRIMARY CARE CLINIC | Age: 89
End: 2024-07-29

## 2024-07-29 RX ORDER — AMLODIPINE BESYLATE 10 MG/1
TABLET ORAL
Qty: 90 TABLET | Refills: 1 | Status: SHIPPED | OUTPATIENT
Start: 2024-07-29

## 2024-07-29 NOTE — TELEPHONE ENCOUNTER
LastVisit 3/8/2024   LastLabs 07/15/2024   NextVisit 9/9/2024   LastRefilled 02/02/2024  Pharmacy:    [id number] - [place], [place] - [address] - P [phone] - F [phone] [address] [place] [address]  Phone: [phone] Fax: [phone]     pharmacy confirmed in EPIC

## 2024-09-05 DIAGNOSIS — I48.0 PAF (PAROXYSMAL ATRIAL FIBRILLATION) (HCC): ICD-10-CM

## 2024-09-05 RX ORDER — SOTALOL HYDROCHLORIDE 80 MG/1
TABLET ORAL
Qty: 180 TABLET | Refills: 1 | Status: SHIPPED | OUTPATIENT
Start: 2024-09-05

## 2024-09-09 ENCOUNTER — OFFICE VISIT (OUTPATIENT)
Dept: PRIMARY CARE CLINIC | Age: 89
End: 2024-09-09

## 2024-09-09 VITALS
HEART RATE: 59 BPM | BODY MASS INDEX: 23.48 KG/M2 | WEIGHT: 159 LBS | OXYGEN SATURATION: 97 % | SYSTOLIC BLOOD PRESSURE: 110 MMHG | DIASTOLIC BLOOD PRESSURE: 60 MMHG

## 2024-09-09 DIAGNOSIS — I48.0 PAF (PAROXYSMAL ATRIAL FIBRILLATION) (HCC): ICD-10-CM

## 2024-09-09 DIAGNOSIS — E78.49 OTHER HYPERLIPIDEMIA: ICD-10-CM

## 2024-09-09 DIAGNOSIS — I10 HYPERTENSION, UNSPECIFIED TYPE: Primary | ICD-10-CM

## 2024-09-09 DIAGNOSIS — E11.9 TYPE 2 DIABETES MELLITUS WITHOUT COMPLICATION, WITHOUT LONG-TERM CURRENT USE OF INSULIN (HCC): ICD-10-CM

## 2024-09-09 RX ORDER — HYDROCHLOROTHIAZIDE 25 MG/1
TABLET ORAL
Qty: 90 TABLET | Refills: 1 | Status: SHIPPED | OUTPATIENT
Start: 2024-09-09

## 2024-09-09 RX ORDER — LISINOPRIL 5 MG/1
TABLET ORAL
Qty: 90 TABLET | Refills: 1 | Status: SHIPPED | OUTPATIENT
Start: 2024-09-09

## 2024-09-09 SDOH — ECONOMIC STABILITY: FOOD INSECURITY: WITHIN THE PAST 12 MONTHS, YOU WORRIED THAT YOUR FOOD WOULD RUN OUT BEFORE YOU GOT MONEY TO BUY MORE.: NEVER TRUE

## 2024-09-09 SDOH — ECONOMIC STABILITY: FOOD INSECURITY: WITHIN THE PAST 12 MONTHS, THE FOOD YOU BOUGHT JUST DIDN'T LAST AND YOU DIDN'T HAVE MONEY TO GET MORE.: NEVER TRUE

## 2024-09-09 SDOH — ECONOMIC STABILITY: INCOME INSECURITY: HOW HARD IS IT FOR YOU TO PAY FOR THE VERY BASICS LIKE FOOD, HOUSING, MEDICAL CARE, AND HEATING?: NOT HARD AT ALL

## 2024-09-09 ASSESSMENT — PATIENT HEALTH QUESTIONNAIRE - PHQ9
1. LITTLE INTEREST OR PLEASURE IN DOING THINGS: NOT AT ALL
SUM OF ALL RESPONSES TO PHQ9 QUESTIONS 1 & 2: 0
2. FEELING DOWN, DEPRESSED OR HOPELESS: NOT AT ALL
SUM OF ALL RESPONSES TO PHQ QUESTIONS 1-9: 0

## 2025-01-22 DIAGNOSIS — I10 HYPERTENSION, UNSPECIFIED TYPE: ICD-10-CM

## 2025-01-22 RX ORDER — AMLODIPINE BESYLATE 10 MG/1
TABLET ORAL
Qty: 90 TABLET | Refills: 1 | Status: SHIPPED | OUTPATIENT
Start: 2025-01-22

## 2025-02-18 DIAGNOSIS — I48.0 PAF (PAROXYSMAL ATRIAL FIBRILLATION) (HCC): ICD-10-CM

## 2025-03-01 DIAGNOSIS — I48.0 PAF (PAROXYSMAL ATRIAL FIBRILLATION) (HCC): ICD-10-CM

## 2025-03-03 RX ORDER — SOTALOL HYDROCHLORIDE 80 MG/1
TABLET ORAL
Qty: 180 TABLET | Refills: 1 | Status: SHIPPED | OUTPATIENT
Start: 2025-03-03

## 2025-03-05 DIAGNOSIS — I48.0 PAF (PAROXYSMAL ATRIAL FIBRILLATION) (HCC): ICD-10-CM

## 2025-03-11 ENCOUNTER — OFFICE VISIT (OUTPATIENT)
Dept: PRIMARY CARE CLINIC | Age: 89
End: 2025-03-11
Payer: COMMERCIAL

## 2025-03-11 VITALS
BODY MASS INDEX: 24.14 KG/M2 | SYSTOLIC BLOOD PRESSURE: 110 MMHG | OXYGEN SATURATION: 98 % | HEIGHT: 69 IN | WEIGHT: 163 LBS | HEART RATE: 56 BPM | DIASTOLIC BLOOD PRESSURE: 48 MMHG

## 2025-03-11 VITALS
WEIGHT: 163.9 LBS | SYSTOLIC BLOOD PRESSURE: 110 MMHG | OXYGEN SATURATION: 98 % | DIASTOLIC BLOOD PRESSURE: 48 MMHG | HEART RATE: 56 BPM | BODY MASS INDEX: 24.2 KG/M2

## 2025-03-11 DIAGNOSIS — E78.49 OTHER HYPERLIPIDEMIA: ICD-10-CM

## 2025-03-11 DIAGNOSIS — E11.9 TYPE 2 DIABETES MELLITUS WITHOUT COMPLICATION, WITHOUT LONG-TERM CURRENT USE OF INSULIN (HCC): Primary | ICD-10-CM

## 2025-03-11 DIAGNOSIS — Z12.5 PROSTATE CANCER SCREENING: ICD-10-CM

## 2025-03-11 DIAGNOSIS — Z00.00 MEDICARE ANNUAL WELLNESS VISIT, SUBSEQUENT: Primary | ICD-10-CM

## 2025-03-11 DIAGNOSIS — I10 HYPERTENSION, UNSPECIFIED TYPE: ICD-10-CM

## 2025-03-11 DIAGNOSIS — I48.0 PAF (PAROXYSMAL ATRIAL FIBRILLATION) (HCC): ICD-10-CM

## 2025-03-11 PROCEDURE — G0439 PPPS, SUBSEQ VISIT: HCPCS | Performed by: INTERNAL MEDICINE

## 2025-03-11 PROCEDURE — 99214 OFFICE O/P EST MOD 30 MIN: CPT | Performed by: INTERNAL MEDICINE

## 2025-03-11 PROCEDURE — 1159F MED LIST DOCD IN RCRD: CPT | Performed by: INTERNAL MEDICINE

## 2025-03-11 PROCEDURE — 1123F ACP DISCUSS/DSCN MKR DOCD: CPT | Performed by: INTERNAL MEDICINE

## 2025-03-11 RX ORDER — LISINOPRIL 5 MG/1
TABLET ORAL
Qty: 90 TABLET | Refills: 1 | Status: SHIPPED | OUTPATIENT
Start: 2025-03-11

## 2025-03-11 RX ORDER — HYDROCHLOROTHIAZIDE 25 MG/1
TABLET ORAL
Qty: 90 TABLET | Refills: 1 | Status: SHIPPED | OUTPATIENT
Start: 2025-03-11

## 2025-03-11 SDOH — ECONOMIC STABILITY: FOOD INSECURITY: WITHIN THE PAST 12 MONTHS, THE FOOD YOU BOUGHT JUST DIDN'T LAST AND YOU DIDN'T HAVE MONEY TO GET MORE.: NEVER TRUE

## 2025-03-11 SDOH — ECONOMIC STABILITY: FOOD INSECURITY: WITHIN THE PAST 12 MONTHS, YOU WORRIED THAT YOUR FOOD WOULD RUN OUT BEFORE YOU GOT MONEY TO BUY MORE.: NEVER TRUE

## 2025-03-11 ASSESSMENT — PATIENT HEALTH QUESTIONNAIRE - PHQ9
SUM OF ALL RESPONSES TO PHQ QUESTIONS 1-9: 0
1. LITTLE INTEREST OR PLEASURE IN DOING THINGS: NOT AT ALL
2. FEELING DOWN, DEPRESSED OR HOPELESS: NOT AT ALL
SUM OF ALL RESPONSES TO PHQ QUESTIONS 1-9: 0

## 2025-03-11 NOTE — PATIENT INSTRUCTIONS

## 2025-03-11 NOTE — PROGRESS NOTES
Normal rate and regular rhythm.      Heart sounds: Normal heart sounds. No murmur heard.  Pulmonary:      Effort: Pulmonary effort is normal. No respiratory distress.      Breath sounds: Normal breath sounds. No wheezing or rales.   Abdominal:      General: Bowel sounds are normal. There is no distension.      Palpations: Abdomen is soft.      Tenderness: There is no abdominal tenderness. There is no guarding or rebound.   Musculoskeletal:         General: No tenderness. Normal range of motion.      Cervical back: Normal range of motion and neck supple.   Lymphadenopathy:      Cervical: No cervical adenopathy.   Skin:     General: Skin is warm and dry.      Findings: No erythema or rash.   Neurological:      Mental Status: He is alert and oriented to person, place, and time.      Cranial Nerves: No cranial nerve deficit.      Coordination: Coordination normal.      Deep Tendon Reflexes: Reflexes normal.   Psychiatric:         Behavior: Behavior normal.         Thought Content: Thought content normal.         Data Review:   CBC:   Lab Results   Component Value Date/Time    WBC 7.4 03/03/2025 10:30 AM    WBC 9.2 02/10/2025 01:15 PM    WBC 11.1 01/21/2025 12:15 PM    HGB 12.1 03/03/2025 10:30 AM    HGB 12.4 02/10/2025 01:15 PM    HGB 11.4 01/21/2025 12:15 PM    HCT 36.2 03/03/2025 10:30 AM    HCT 37.2 02/10/2025 01:15 PM    HCT 34.8 01/21/2025 12:15 PM    MCV 89.6 03/03/2025 10:30 AM    MCV 90.0 02/10/2025 01:15 PM    MCV 88.7 01/21/2025 12:15 PM     03/03/2025 10:30 AM     02/10/2025 01:15 PM     01/21/2025 12:15 PM     Chemistry:   Lab Results   Component Value Date/Time     03/03/2025 10:30 AM     02/10/2025 01:15 PM     01/21/2025 12:15 PM    K 4.2 03/03/2025 10:30 AM    K 4.4 02/10/2025 01:15 PM    K 4.8 01/21/2025 12:15 PM     03/03/2025 10:30 AM     02/10/2025 01:15 PM     01/21/2025 12:15 PM    CO2 26 03/03/2025 10:30 AM    CO2 29 02/10/2025 01:15 PM

## 2025-03-11 NOTE — PROGRESS NOTES
Medicare Annual Wellness Visit    Jimbo Babcock is here for Medicare AWV    Assessment & Plan   Medicare annual wellness visit, subsequent     Return in 1 year (on 3/11/2026) for Medicare AW.     Subjective       Patient's complete Health Risk Assessment and screening values have been reviewed and are found in Flowsheets. The following problems were reviewed today and where indicated follow up appointments were made and/or referrals ordered.    Positive Risk Factor Screenings with Interventions:                 Dentist Screen:  Have you seen the dentist within the past year?: (!) No    Intervention:  See AVS for additional education material     Vision Screen:  Do you have difficulty driving, watching TV, or doing any of your daily activities because of your eyesight?: No  Have you had an eye exam within the past year?: (!) No  Interventions:   See AVS for additional education material            Patient is up to date on all care gaps none addressed addressed today         Objective   Vitals:    03/11/25 1424   BP: (!) 110/48   Pulse: 56   SpO2: 98%   Weight: 73.9 kg (163 lb)   Height: 1.753 m (5' 9\")      Body mass index is 24.07 kg/m².                  No Known Allergies  Prior to Visit Medications    Medication Sig Taking? Authorizing Provider   apixaban (ELIQUIS) 2.5 MG TABS tablet TAKE 1 TABLET BY MOUTH 2 TIMES A DAY IF NO BLEED NOTED.  Niels Moser MD   hydroCHLOROthiazide (HYDRODIURIL) 25 MG tablet TAKE ONE TABLET BY MOUTH DAILY in AM  Niels Moser MD   lisinopril (PRINIVIL;ZESTRIL) 5 MG tablet TAKE ONE TABLET BY MOUTH DAILY  Niels Moser MD   sotalol (BETAPACE) 80 MG tablet TAKE ONE TABLET BY MOUTH EVERY 12 HOURS  Niels Moser MD   amLODIPine (NORVASC) 10 MG tablet TAKE 1 TABLET BY MOUTH DAILY  Niels Moser MD   Multiple Vitamins-Minerals (THERAPEUTIC MULTIVITAMIN-MINERALS) tablet Take 1 tablet by mouth daily  ProviderSharif MD CareTeam (Including outside providers/suppliers

## 2025-06-02 ENCOUNTER — OFFICE VISIT (OUTPATIENT)
Dept: PRIMARY CARE CLINIC | Age: 89
End: 2025-06-02
Payer: COMMERCIAL

## 2025-06-02 VITALS
WEIGHT: 161.6 LBS | BODY MASS INDEX: 23.86 KG/M2 | OXYGEN SATURATION: 98 % | HEART RATE: 68 BPM | SYSTOLIC BLOOD PRESSURE: 98 MMHG | DIASTOLIC BLOOD PRESSURE: 40 MMHG

## 2025-06-02 DIAGNOSIS — L30.9 DERMATITIS: Primary | ICD-10-CM

## 2025-06-02 PROCEDURE — 1159F MED LIST DOCD IN RCRD: CPT | Performed by: INTERNAL MEDICINE

## 2025-06-02 PROCEDURE — 99214 OFFICE O/P EST MOD 30 MIN: CPT | Performed by: INTERNAL MEDICINE

## 2025-06-02 PROCEDURE — 1123F ACP DISCUSS/DSCN MKR DOCD: CPT | Performed by: INTERNAL MEDICINE

## 2025-06-02 RX ORDER — TRIAMCINOLONE ACETONIDE 0.25 MG/G
CREAM TOPICAL EVERY 4 HOURS PRN
COMMUNITY

## 2025-06-02 NOTE — PROGRESS NOTES
6/2/2025   Jimbo Babcock  12/31/1933    The patients PMH, surgical history, family history, medications, allergies were all reviewed and updated as appropriate today.     Current Outpatient Medications on File Prior to Visit   Medication Sig Dispense Refill    triamcinolone (KENALOG) 0.025 % cream Apply topically every 4 hours as needed Apply Topically      apixaban (ELIQUIS) 2.5 MG TABS tablet TAKE 1 TABLET BY MOUTH 2 TIMES A DAY IF NO BLEED NOTED. 60 tablet 5    hydroCHLOROthiazide (HYDRODIURIL) 25 MG tablet TAKE ONE TABLET BY MOUTH DAILY in AM 90 tablet 1    lisinopril (PRINIVIL;ZESTRIL) 5 MG tablet TAKE ONE TABLET BY MOUTH DAILY 90 tablet 1    sotalol (BETAPACE) 80 MG tablet TAKE ONE TABLET BY MOUTH EVERY 12 HOURS 180 tablet 1    amLODIPine (NORVASC) 10 MG tablet TAKE 1 TABLET BY MOUTH DAILY 90 tablet 1    Multiple Vitamins-Minerals (THERAPEUTIC MULTIVITAMIN-MINERALS) tablet Take 1 tablet by mouth daily       No current facility-administered medications on file prior to visit.      ACUTE CARE VISIT  Chief Complaint   Patient presents with    Rash     Pt c/o itching possible rash, per pt this started almost one year ago since he started his chemo treatment.     Dizziness     Pt c/o severe fatigue with lightheaded and dizziness.       HPI:  c/o lightheadedness and itching since being on treatment for his skin CA    On Abrysvo per Zay for skin CA  \"None of the meds she gave h=me have helped me\"    Review of Systems    OBJECTIVE:  BP (!) 98/40   Pulse 68   Wt 73.3 kg (161 lb 9.6 oz)   SpO2 98%   BMI 23.86 kg/m²    Wt Readings from Last 3 Encounters:   06/02/25 73.3 kg (161 lb 9.6 oz)   03/11/25 73.9 kg (163 lb)   03/11/25 74.3 kg (163 lb 14.4 oz)       Physical Exam  Vitals and nursing note reviewed.   Constitutional:       General: He is not in acute distress.     Appearance: He is well-developed.      Comments: BP (!) 98/40   Pulse 68   Wt 73.3 kg (161 lb 9.6 oz)   SpO2 98%   BMI 23.86 kg/m²    HENT:

## 2025-06-16 ENCOUNTER — OFFICE VISIT (OUTPATIENT)
Dept: PRIMARY CARE CLINIC | Age: 89
End: 2025-06-16
Payer: COMMERCIAL

## 2025-06-16 VITALS
DIASTOLIC BLOOD PRESSURE: 40 MMHG | HEART RATE: 62 BPM | BODY MASS INDEX: 24.45 KG/M2 | OXYGEN SATURATION: 97 % | SYSTOLIC BLOOD PRESSURE: 100 MMHG | WEIGHT: 165.6 LBS

## 2025-06-16 DIAGNOSIS — I95.2 HYPOTENSION DUE TO DRUGS: Primary | ICD-10-CM

## 2025-06-16 PROCEDURE — 1159F MED LIST DOCD IN RCRD: CPT | Performed by: INTERNAL MEDICINE

## 2025-06-16 PROCEDURE — 1123F ACP DISCUSS/DSCN MKR DOCD: CPT | Performed by: INTERNAL MEDICINE

## 2025-06-16 PROCEDURE — 99212 OFFICE O/P EST SF 10 MIN: CPT | Performed by: INTERNAL MEDICINE

## 2025-06-16 NOTE — PROGRESS NOTES
6/16/2025   Jimbo Babcock  12/31/1933    The patients PMH, surgical history, family history, medications, allergies were all reviewed and updated as appropriate today.     Current Outpatient Medications on File Prior to Visit   Medication Sig Dispense Refill    triamcinolone (KENALOG) 0.025 % cream Apply topically every 4 hours as needed Apply Topically      apixaban (ELIQUIS) 2.5 MG TABS tablet TAKE 1 TABLET BY MOUTH 2 TIMES A DAY IF NO BLEED NOTED. 60 tablet 5    hydroCHLOROthiazide (HYDRODIURIL) 25 MG tablet TAKE ONE TABLET BY MOUTH DAILY in AM 90 tablet 1    lisinopril (PRINIVIL;ZESTRIL) 5 MG tablet TAKE ONE TABLET BY MOUTH DAILY 90 tablet 1    sotalol (BETAPACE) 80 MG tablet TAKE ONE TABLET BY MOUTH EVERY 12 HOURS 180 tablet 1    Multiple Vitamins-Minerals (THERAPEUTIC MULTIVITAMIN-MINERALS) tablet Take 1 tablet by mouth daily       No current facility-administered medications on file prior to visit.        Chief Complaint   Patient presents with    Blood Pressure Check     Per pt here on his two week follow-up for blood pressure.        HPI:  pt returns for BP check since stopping amlodipine due to hypotension  Labs 5/5 were stable, no new anemia    Already referred to Dermatology re: rash s/p XRT for skin CA  Saw Cerm he said its from all those infusion, got 4 shots    Review of Systems    OBJECTIVE:  BP (!) 114/38 (BP Site: Right Upper Arm, Patient Position: Sitting, BP Cuff Size: Large Adult)   Wt 75.1 kg (165 lb 9.6 oz)   BMI 24.45 kg/m²   Wt Readings from Last 3 Encounters:   06/16/25 75.1 kg (165 lb 9.6 oz)   06/02/25 73.3 kg (161 lb 9.6 oz)   03/11/25 73.9 kg (163 lb)       Physical Exam  Vitals and nursing note reviewed.   Constitutional:       General: He is not in acute distress.     Appearance: He is well-developed.      Comments: BP (!) 100/40   Wt 75.1 kg (165 lb 9.6 oz)   BMI 24.45 kg/m²    HENT:      Head: Normocephalic and atraumatic.   Eyes:      General: No scleral icterus.

## 2025-07-16 ENCOUNTER — OFFICE VISIT (OUTPATIENT)
Dept: PRIMARY CARE CLINIC | Age: 89
End: 2025-07-16
Payer: COMMERCIAL

## 2025-07-16 VITALS
OXYGEN SATURATION: 96 % | SYSTOLIC BLOOD PRESSURE: 140 MMHG | HEART RATE: 66 BPM | BODY MASS INDEX: 23.63 KG/M2 | WEIGHT: 160 LBS | DIASTOLIC BLOOD PRESSURE: 52 MMHG

## 2025-07-16 DIAGNOSIS — E78.49 OTHER HYPERLIPIDEMIA: ICD-10-CM

## 2025-07-16 DIAGNOSIS — I10 HYPERTENSION, UNSPECIFIED TYPE: Primary | ICD-10-CM

## 2025-07-16 DIAGNOSIS — I65.21 STENOSIS OF RIGHT CAROTID ARTERY: ICD-10-CM

## 2025-07-16 DIAGNOSIS — I48.0 PAF (PAROXYSMAL ATRIAL FIBRILLATION) (HCC): ICD-10-CM

## 2025-07-16 PROCEDURE — 1123F ACP DISCUSS/DSCN MKR DOCD: CPT | Performed by: INTERNAL MEDICINE

## 2025-07-16 PROCEDURE — 1159F MED LIST DOCD IN RCRD: CPT | Performed by: INTERNAL MEDICINE

## 2025-07-16 PROCEDURE — 99213 OFFICE O/P EST LOW 20 MIN: CPT | Performed by: INTERNAL MEDICINE

## 2025-07-16 RX ORDER — SOTALOL HYDROCHLORIDE 80 MG/1
TABLET ORAL
Qty: 180 TABLET | Refills: 1 | Status: SHIPPED | OUTPATIENT
Start: 2025-07-16

## 2025-07-16 RX ORDER — LISINOPRIL 5 MG/1
TABLET ORAL
Qty: 90 TABLET | Refills: 1 | Status: SHIPPED | OUTPATIENT
Start: 2025-07-16

## 2025-07-16 RX ORDER — ATORVASTATIN CALCIUM 10 MG/1
10 TABLET, FILM COATED ORAL DAILY
Qty: 90 TABLET | Refills: 1 | Status: SHIPPED | OUTPATIENT
Start: 2025-07-16

## 2025-07-16 NOTE — PROGRESS NOTES
7/16/2025   Jimbo Babcock  12/31/1933    The patients PMH, surgical history, family history, medications, allergies were all reviewed and updated as appropriate today.     Current Outpatient Medications on File Prior to Visit   Medication Sig Dispense Refill    triamcinolone (KENALOG) 0.025 % cream Apply topically every 4 hours as needed Apply Topically      apixaban (ELIQUIS) 2.5 MG TABS tablet TAKE 1 TABLET BY MOUTH 2 TIMES A DAY IF NO BLEED NOTED. 60 tablet 5    lisinopril (PRINIVIL;ZESTRIL) 5 MG tablet TAKE ONE TABLET BY MOUTH DAILY 90 tablet 1    sotalol (BETAPACE) 80 MG tablet TAKE ONE TABLET BY MOUTH EVERY 12 HOURS 180 tablet 1    Multiple Vitamins-Minerals (THERAPEUTIC MULTIVITAMIN-MINERALS) tablet Take 1 tablet by mouth daily       No current facility-administered medications on file prior to visit.        Chief Complaint   Patient presents with    Hypertension     Per pt here for his one month follow up for blood pressure check.        HPI:  here for BP check, now on Lisinopril 5 QD    Review of Systems    NEEDS BP RECHECKED AGAIN TODAY    OBJECTIVE:  BP (!) 140/52 (BP Site: Right Upper Arm, Patient Position: Sitting, BP Cuff Size: Large Adult)   Pulse 66   Wt 72.6 kg (160 lb)   SpO2 96%   BMI 23.63 kg/m²    Wt Readings from Last 3 Encounters:   07/16/25 72.6 kg (160 lb)   06/16/25 75.1 kg (165 lb 9.6 oz)   06/02/25 73.3 kg (161 lb 9.6 oz)         Physical Exam  Vitals and nursing note reviewed.   Constitutional:       General: He is not in acute distress.     Appearance: He is well-developed.      Comments: BP (!) 140/52 (BP Site: Right Upper Arm, Patient Position: Sitting, BP Cuff Size: Large Adult)   Pulse 66   Wt 72.6 kg (160 lb)   SpO2 96%   BMI 23.63 kg/m²    HENT:      Head: Normocephalic and atraumatic.   Eyes:      General: No scleral icterus.        Right eye: No discharge.         Left eye: No discharge.      Conjunctiva/sclera: Conjunctivae normal.      Pupils: Pupils are equal,

## 2025-07-18 ENCOUNTER — TELEPHONE (OUTPATIENT)
Dept: PRIMARY CARE CLINIC | Age: 89
End: 2025-07-18

## 2025-07-18 NOTE — TELEPHONE ENCOUNTER
Patient wants to verify that his medication list and our medication list are the same.   He has questions on how and when to take his medications.

## 2025-07-23 NOTE — TELEPHONE ENCOUNTER
Spoke with patient. Pt was still taking HCTZ and stopped lisinopril. As per last office visit note (7/16/2025), advised pt to stop HCTZ and continue taking lisinopril 5 mg.